# Patient Record
Sex: FEMALE | Race: WHITE | Employment: FULL TIME | URBAN - METROPOLITAN AREA
[De-identification: names, ages, dates, MRNs, and addresses within clinical notes are randomized per-mention and may not be internally consistent; named-entity substitution may affect disease eponyms.]

---

## 2017-06-12 ENCOUNTER — HOSPITAL ENCOUNTER (EMERGENCY)
Age: 32
Discharge: HOME | End: 2017-06-12
Attending: EMERGENCY MEDICINE
Payer: COMMERCIAL

## 2017-06-12 VITALS
RESPIRATION RATE: 18 BRPM | HEART RATE: 68 BPM | BODY MASS INDEX: 39.75 KG/M2 | WEIGHT: 216 LBS | TEMPERATURE: 98.3 F | OXYGEN SATURATION: 100 % | HEIGHT: 62 IN | DIASTOLIC BLOOD PRESSURE: 65 MMHG | SYSTOLIC BLOOD PRESSURE: 147 MMHG
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
## 2017-06-12 DIAGNOSIS — S80.861A INSECT BITE OF LEG, RIGHT, INITIAL ENCOUNTER: Primary | ICD-10-CM

## 2017-06-12 DIAGNOSIS — T78.3XXA GIANT HIVES, INITIAL ENCOUNTER: ICD-10-CM

## 2017-06-12 LAB
ALBUMIN SERPL BCP-MCNC: 3.4 G/DL (ref 3.5–5)
ALBUMIN/GLOB SERPL: 0.9 {RATIO} (ref 1.2–3.5)
ALP SERPL-CCNC: 72 U/L (ref 50–136)
ALT SERPL-CCNC: 23 U/L (ref 12–65)
ANION GAP BLD CALC-SCNC: 11 MMOL/L (ref 7–16)
AST SERPL W P-5'-P-CCNC: 30 U/L (ref 15–37)
BASOPHILS # BLD AUTO: 0 K/UL (ref 0–0.2)
BASOPHILS # BLD: 0 % (ref 0–2)
BILIRUB SERPL-MCNC: 0.2 MG/DL (ref 0.2–1.1)
BUN SERPL-MCNC: 12 MG/DL (ref 6–23)
CALCIUM SERPL-MCNC: 9 MG/DL (ref 8.3–10.4)
CHLORIDE SERPL-SCNC: 104 MMOL/L (ref 98–107)
CO2 SERPL-SCNC: 25 MMOL/L (ref 21–32)
CREAT SERPL-MCNC: 0.91 MG/DL (ref 0.6–1)
DIFFERENTIAL METHOD BLD: ABNORMAL
EOSINOPHIL # BLD: 0 K/UL (ref 0–0.8)
EOSINOPHIL NFR BLD: 0 % (ref 0.5–7.8)
ERYTHROCYTE [DISTWIDTH] IN BLOOD BY AUTOMATED COUNT: 13.8 % (ref 11.9–14.6)
GLOBULIN SER CALC-MCNC: 3.6 G/DL (ref 2.3–3.5)
GLUCOSE SERPL-MCNC: 91 MG/DL (ref 65–100)
HCT VFR BLD AUTO: 39.6 % (ref 35.8–46.3)
HGB BLD-MCNC: 13.8 G/DL (ref 11.7–15.4)
IMM GRANULOCYTES # BLD: 0 K/UL (ref 0–0.5)
IMM GRANULOCYTES NFR BLD AUTO: 0.2 % (ref 0–5)
LACTATE BLD-SCNC: 2.4 MMOL/L (ref 0.5–1.9)
LYMPHOCYTES # BLD AUTO: 23 % (ref 13–44)
LYMPHOCYTES # BLD: 2.3 K/UL (ref 0.5–4.6)
MCH RBC QN AUTO: 30.5 PG (ref 26.1–32.9)
MCHC RBC AUTO-ENTMCNC: 34.8 G/DL (ref 31.4–35)
MCV RBC AUTO: 87.4 FL (ref 79.6–97.8)
MONOCYTES # BLD: 0.4 K/UL (ref 0.1–1.3)
MONOCYTES NFR BLD AUTO: 4 % (ref 4–12)
NEUTS SEG # BLD: 7.2 K/UL (ref 1.7–8.2)
NEUTS SEG NFR BLD AUTO: 73 % (ref 43–78)
PLATELET # BLD AUTO: 243 K/UL (ref 150–450)
PMV BLD AUTO: 10.5 FL (ref 10.8–14.1)
POTASSIUM SERPL-SCNC: 3.9 MMOL/L (ref 3.5–5.1)
PROT SERPL-MCNC: 7 G/DL (ref 6.3–8.2)
RBC # BLD AUTO: 4.53 M/UL (ref 4.05–5.25)
SODIUM SERPL-SCNC: 140 MMOL/L (ref 136–145)
WBC # BLD AUTO: 10 K/UL (ref 4.3–11.1)

## 2017-06-12 PROCEDURE — 96372 THER/PROPH/DIAG INJ SC/IM: CPT | Performed by: EMERGENCY MEDICINE

## 2017-06-12 PROCEDURE — 83605 ASSAY OF LACTIC ACID: CPT

## 2017-06-12 PROCEDURE — 85025 COMPLETE CBC W/AUTO DIFF WBC: CPT | Performed by: EMERGENCY MEDICINE

## 2017-06-12 PROCEDURE — 74011250636 HC RX REV CODE- 250/636: Performed by: EMERGENCY MEDICINE

## 2017-06-12 PROCEDURE — 80053 COMPREHEN METABOLIC PANEL: CPT | Performed by: EMERGENCY MEDICINE

## 2017-06-12 PROCEDURE — 99284 EMERGENCY DEPT VISIT MOD MDM: CPT | Performed by: EMERGENCY MEDICINE

## 2017-06-12 RX ORDER — SULFAMETHOXAZOLE AND TRIMETHOPRIM 800; 160 MG/1; MG/1
1 TABLET ORAL 2 TIMES DAILY
Qty: 14 TAB | Refills: 0 | Status: SHIPPED | OUTPATIENT
Start: 2017-06-12 | End: 2017-06-19

## 2017-06-12 RX ORDER — DEXAMETHASONE SODIUM PHOSPHATE 100 MG/10ML
10 INJECTION INTRAMUSCULAR; INTRAVENOUS
Status: COMPLETED | OUTPATIENT
Start: 2017-06-12 | End: 2017-06-12

## 2017-06-12 RX ORDER — SULFAMETHOXAZOLE AND TRIMETHOPRIM 800; 160 MG/1; MG/1
1 TABLET ORAL 2 TIMES DAILY
Qty: 14 TAB | Refills: 0 | Status: SHIPPED | OUTPATIENT
Start: 2017-06-12 | End: 2017-06-12

## 2017-06-12 RX ADMIN — DEXAMETHASONE SODIUM PHOSPHATE 10 MG: 10 INJECTION INTRAMUSCULAR; INTRAVENOUS at 16:47

## 2017-06-12 NOTE — ED PROVIDER NOTES
HPI Comments: Patient states she thinks she got bit while tubing on the [facility] yesterday. She has a large area of redness that is very itchy. She has not taken anything for it. Patient is a 28 y.o. female presenting with lower extremity edema. The history is provided by the patient. Leg Swelling    This is a new problem. The current episode started yesterday. The problem occurs constantly. The problem has been gradually worsening. The pain is present in the right upper leg. The pain is mild. Associated symptoms include itching. She has tried nothing for the symptoms. There has been no history of extremity trauma. Past Medical History:   Diagnosis Date    Abnormal Pap smear     Anxiety     Depression     Elevated prolactin level (HCC)     Gastric ulcer     GERD (gastroesophageal reflux disease)     Infertility     LGSIL (low grade squamous intraepithelial dysplasia)     Morbid obesity (HCC)        Past Surgical History:   Procedure Laterality Date    HX DILATION AND CURETTAGE  2/17/16    Hysteroscopy/polypectomy    HX GYN      LEEP for NINFA    HX GYN       IUI's    HX PELVIC LAPAROSCOPY  2/17/16    Laser         Family History:   Problem Relation Age of Onset    Other Mother      Colitis    Arthritis-osteo Brother      Rheumatoid    Celiac Disease Sister 27    No Known Problems Father        Social History     Social History    Marital status:      Spouse name: N/A    Number of children: N/A    Years of education: N/A     Occupational History    Not on file.      Social History Main Topics    Smoking status: Former Smoker    Smokeless tobacco: Never Used      Comment: former some day smoker for about 2 yrs in her 19's    Alcohol use 1.0 oz/week     2 Glasses of wine per week    Drug use: No    Sexual activity: Yes     Partners: Male     Birth control/ protection: None      Comment: infertility     Other Topics Concern    Not on file     Social History Narrative    No narrative on file         ALLERGIES: Review of patient's allergies indicates no known allergies. Review of Systems   Constitutional: Negative for chills and fever. Skin: Positive for color change, itching and wound. Vitals:    06/12/17 1424 06/12/17 1612 06/12/17 1616 06/12/17 1646   BP: 145/83 194/86 145/67 147/65   Pulse: 76 70 75 68   Resp: 16 18 18 18   Temp: 98.5 °F (36.9 °C)   98.3 °F (36.8 °C)   SpO2: 99% 100% 100% 100%   Weight: 98 kg (216 lb)      Height: 5' 2\" (1.575 m)               Physical Exam   Constitutional: She is oriented to person, place, and time. She appears well-developed and well-nourished. No distress. Musculoskeletal: She exhibits tenderness. rright thigh above the knee with large area of erythema but minimal edema more consistent with a hive  Then cellulitis. She has a central excoriated area but no abscess. Neurological: She is alert and oriented to person, place, and time. Skin: Skin is warm and dry. She is not diaphoretic. Nursing note and vitals reviewed. MDM  Number of Diagnoses or Management Options  Giant hives, initial encounter:   Insect bite of leg, right, initial encounter:   Diagnosis management comments: Patient given a shot of Decadron and Bactrim to go home with. I suspect her slightly elevated lactic is secondary to her metformin. This does not look infected but more allergic and her symptoms of itching and normal white count to coincide with that. 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        Amount and/or Complexity of Data Reviewed  Clinical lab tests: ordered and reviewed    Risk of Complications, Morbidity, and/or Mortality  Presenting problems: moderate  Diagnostic procedures: low  Management options: low    Patient Progress  Patient progress: stable    ED Course       Procedures

## 2017-06-12 NOTE — ED TRIAGE NOTES
Pt. With redness and swelling around right knee are. Pt. States she believes she got bit by something. Red cellulitic in nature.

## 2017-06-12 NOTE — DISCHARGE INSTRUCTIONS
Allergic Reaction: Care Instructions  Your Care Instructions  An allergic reaction is an excessive response from your immune system to a medicine, chemical, food, insect bite, or other substance. A reaction can range from mild to life-threatening. Some people have a mild rash, hives, and itching or stomach cramps. In severe reactions, swelling of your tongue and throat can close up your airway so that you cannot breathe. Follow-up care is a key part of your treatment and safety. Be sure to make and go to all appointments, and call your doctor if you are having problems. It's also a good idea to know your test results and keep a list of the medicines you take. How can you care for yourself at home? · If you know what caused your allergic reaction, be sure to avoid it. Your allergy may become more severe each time you have a reaction. · Take an over-the-counter antihistamine, such as cetirizine (Zyrtec) or loratadine (Claritin), to treat mild symptoms. Read and follow directions on the label. Some antihistamines can make you feel sleepy. Do not give antihistamines to a child unless you have checked with your doctor first. Mild symptoms include sneezing or an itchy or runny nose; an itchy mouth; a few hives or mild itching; and mild nausea or stomach discomfort. · Do not scratch hives or a rash. Put a cold, moist towel on them or take cool baths to relieve itching. Put ice packs on hives, swelling, or insect stings for 10 to 15 minutes at a time. Put a thin cloth between the ice pack and your skin. Do not take hot baths or showers. They will make the itching worse. · Your doctor may prescribe a shot of epinephrine to carry with you in case you have a severe reaction. Learn how to give yourself the shot and keep it with you at all times. Make sure it is not . · Go to the emergency room every time you have a severe reaction, even if you have used your shot of epinephrine and are feeling better. Symptoms can come back after a shot. · Wear medical alert jewelry that lists your allergies. You can buy this at most drugstores. · If your child has a severe allergy, make sure that his or her teachers, babysitters, coaches, and other caregivers know about the allergy. They should have an epinephrine shot, know how and when to give it, and have a plan to take your child to the hospital.  When should you call for help? Give an epinephrine shot if:  · You think you are having a severe allergic reaction. · You have symptoms in more than one body area, such as mild nausea and an itchy mouth. After giving an epinephrine shot call 911, even if you feel better. Call 911 if:  · You have symptoms of a severe allergic reaction. These may include:  ¨ Sudden raised, red areas (hives) all over your body. ¨ Swelling of the throat, mouth, lips, or tongue. ¨ Trouble breathing. ¨ Passing out (losing consciousness). Or you may feel very lightheaded or suddenly feel weak, confused, or restless. · You have been given an epinephrine shot, even if you feel better. Call your doctor now or seek immediate medical care if:  · You have symptoms of an allergic reaction, such as:  ¨ A rash or hives (raised, red areas on the skin). ¨ Itching. ¨ Swelling. ¨ Belly pain, nausea, or vomiting. Watch closely for changes in your health, and be sure to contact your doctor if:  · You do not get better as expected. Where can you learn more? Go to http://luis-micheal.info/. Enter A812 in the search box to learn more about \"Allergic Reaction: Care Instructions. \"  Current as of: February 12, 2016  Content Version: 11.2  © 9148-9179 [employer]. Care instructions adapted under license by [employer] (which disclaims liability or warranty for this information).  If you have questions about a medical condition or this instruction, always ask your healthcare professional. Dusty Wilson disclaims any warranty or liability for your use of this information. Insect Stings and Bites: Care Instructions  Your Care Instructions  Stings and bites from bees, wasps, ants, and other insects often cause pain, swelling, redness, and itching. In some people, especially children, the redness and swelling may be worse. It may extend several inches beyond the affected area. But in most cases, stings and bites don't cause reactions all over the body. If you have had a reaction to an insect sting or bite, you are at risk for a reaction if you get stung or bitten again. Follow-up care is a key part of your treatment and safety. Be sure to make and go to all appointments, and call your doctor if you are having problems. It's also a good idea to know your test results and keep a list of the medicines you take. How can you care for yourself at home? · Do not scratch or rub the skin where the sting or bite occurred. · Put a cold pack or ice cube on the area. Put a thin cloth between the ice and your skin. For some people, a paste of baking soda mixed with a little water helps relieve pain and decrease the reaction. · Take an over-the-counter antihistamine, such as diphenhydramine (Benadryl) or loratadine (Claritin), to relieve swelling, redness, and itching. Calamine lotion or hydrocortisone cream may also help. Do not give antihistamines to your child unless you have checked with the doctor first.  · Be safe with medicines. If your doctor prescribed medicine for your allergy, take it exactly as prescribed. Call your doctor if you think you are having a problem with your medicine. You will get more details on the specific medicines your doctor prescribes. · Your doctor may prescribe a shot of epinephrine to carry with you in case you have a severe reaction. Learn how and when to give yourself the shot, and keep it with you at all times. Make sure it has not .   · Go to the emergency room anytime you have a severe reaction. Go even if you have given yourself epinephrine and are feeling better. Symptoms can come back. When should you call for help? Call 911 anytime you think you may need emergency care. For example, call if:  · You have symptoms of a severe allergic reaction. These may include:  ¨ Sudden raised, red areas (hives) all over your body. ¨ Swelling of the throat, mouth, lips, or tongue. ¨ Trouble breathing. ¨ Passing out (losing consciousness). Or you may feel very lightheaded or suddenly feel weak, confused, or restless. Call your doctor now or seek immediate medical care if:  · You have symptoms of an allergic reaction not right at the sting or bite, such as:  ¨ A rash or small area of hives (raised, red areas on the skin). ¨ Itching. ¨ Swelling. ¨ Belly pain, nausea, or vomiting. · You have a lot of swelling around the site (such as your entire arm or leg is swollen). · You have signs of infection, such as:  ¨ Increased pain, swelling, redness, or warmth around the sting. ¨ Red streaks leading from the area. ¨ Pus draining from the sting. ¨ A fever. Watch closely for changes in your health, and be sure to contact your doctor if:  · You do not get better as expected. Where can you learn more? Go to http://luis-micehal.info/. Enter P390 in the search box to learn more about \"Insect Stings and Bites: Care Instructions. \"  Current as of: July 28, 2016  Content Version: 11.2  © 0961-3723 [employer]. Care instructions adapted under license by [employer] (which disclaims liability or warranty for this information). If you have questions about a medical condition or this instruction, always ask your healthcare professional. [place] [address] any warranty or liability for your use of this information.

## 2017-06-12 NOTE — ED NOTES
I have reviewed discharge instructions with the patient. The patient verbalized understanding. I have reviewed discharge instructions with the patient. The patient verbalized understanding.

## 2017-09-13 PROBLEM — O99.212 OBESITY AFFECTING PREGNANCY IN SECOND TRIMESTER: Status: ACTIVE | Noted: 2017-09-13

## 2017-09-13 PROBLEM — Z34.02 SUPERVISION OF NORMAL FIRST PREGNANCY IN SECOND TRIMESTER: Status: ACTIVE | Noted: 2017-09-13

## 2017-10-23 PROBLEM — Z98.890 HISTORY OF LOOP ELECTRICAL EXCISION PROCEDURE (LEEP): Status: ACTIVE | Noted: 2017-10-23

## 2017-12-04 PROBLEM — O34.42 HX LEEP (LOOP ELECTROSURGICAL EXCISION PROCEDURE), CERVIX, PREGNANCY, SECOND TRIMESTER: Status: ACTIVE | Noted: 2017-10-23

## 2017-12-04 PROBLEM — O35.8XX0 SUSPECTED DAMAGE TO FETUS FROM OTHER DISEASE IN MOTHER, AFFECTING MANAGEMENT OF MOTHER, ANTEPARTUM CONDITION OR COMPLICATION: Status: ACTIVE | Noted: 2017-12-04

## 2017-12-04 PROBLEM — O09.92 HIGH-RISK PREGNANCY IN SECOND TRIMESTER: Status: ACTIVE | Noted: 2017-09-13

## 2018-02-22 ENCOUNTER — HOSPITAL ENCOUNTER (EMERGENCY)
Age: 33
Discharge: HOME | End: 2018-02-22
Attending: OBSTETRICS & GYNECOLOGY | Admitting: OBSTETRICS & GYNECOLOGY
Payer: COMMERCIAL

## 2018-02-22 VITALS
BODY MASS INDEX: 44.97 KG/M2 | HEIGHT: 61 IN | SYSTOLIC BLOOD PRESSURE: 144 MMHG | DIASTOLIC BLOOD PRESSURE: 77 MMHG | HEART RATE: 67 BPM | TEMPERATURE: 98.6 F | RESPIRATION RATE: 18 BRPM

## 2018-02-22 PROBLEM — Z34.90 PREGNANCY: Status: ACTIVE | Noted: 2018-02-22

## 2018-02-22 PROCEDURE — 99281 EMR DPT VST MAYX REQ PHY/QHP: CPT

## 2018-02-22 PROCEDURE — 99283 EMERGENCY DEPT VISIT LOW MDM: CPT | Performed by: OBSTETRICS & GYNECOLOGY

## 2018-02-22 PROCEDURE — 59025 FETAL NON-STRESS TEST: CPT

## 2018-02-22 NOTE — IP AVS SNAPSHOT
Summary of Care Report The Summary of Care report has been created to help improve care coordination. Users with access to [employer] or [facility] (Web-based application) may access additional patient information including the Discharge Summary. If you are not currently a [address] user and need more information, please call the number listed below in the [place] [address] section and ask to be connected with Medical Records. Facility Information Name Address Phone [id number] [place] [address] [address] [place] [id number] [phone] Patient Information Patient Name Sex LATASHA Burns ([id number]) Female 1985 Discharge Information Admitting Provider Service Area Unit Isabel Le MD / [address] [facility] [address] / [phone] Discharge Provider Discharge Date/Time Discharge Disposition Destination (none) (none) (none) (none) Patient Language Language ENGLISH [13] Hospital Problems as of 2018  Reviewed: 2018  4:04 PM by Klever Urbano MD  
  
  
  
 Class Noted - Resolved Last Modified POA Active Problems Pregnancy  2018 - Present 2018 by Isabel Le MD Unknown Entered by Isabel Le MD  
  
Non-Hospital Problems as of 2018  Reviewed: 2018  4:04 PM by Klever Urbano MD  
  
  
  
 Class Noted - Resolved Last Modified Active Problems High-risk pregnancy in second trimester  2017 - Present 2017 by Anna Lowery MD  
  Entered by Klever Urbano MD  
  Overview Signed 2017 10:43 PM by Klever Urbano MD  
   26yo G1 
PREG pt 
AB + / RI / nl pap   Obesity affecting pregnancy in second trimester  2017 - Present 2017 by Anna Lowery MD  
  Entered by Klever Urbano MD  
 Overview Addendum 10/23/2017  2:41 PM by Christiano Chan MD  
   Early GTT at 15 wks Early glucola wnl (111), repeat 28wks Hx LEEP (loop electrosurgical excision procedure), cervix, pregnancy, second trimester  10/23/2017 - Present 12/4/2017 by Tacos Pop MD  
  Entered by Christiano Chan MD  
  Overview Addendum 12/4/2017  3:36 PM by Hari Segovia RN  
   20wk CL: ____  
 
12/4/2017 at [facility]:  TA CL 4.7 cm. No PTL symptoms. Suspected damage to fetus from other disease in mother, affecting management of mother, antepartum condition or complication  36/[id number]/[id number] - Present 12/4/2017 by Hari Segovia RN Entered by Hari Segovia RN Overview Addendum 12/4/2017  4:26 PM by Hari Segovia RN  
   12/4/2017 at [facility]:  Normal Anatomy/Fetal Echo, TA CL 4.7 cm. · No follow up at [facility]; will see back prn. Breech presentation, no version  2/22/2018 - Present 2/22/2018 by Dillon Brunson MD  
  Entered by Dillon Brunson MD  
  
You are allergic to the following No active allergies Current Discharge Medication List  
  
ASK your doctor about these medications Dose & Instructions Dispensing Information Comments Blood-Glucose Meter monitoring kit Check qid one hour after each meal and before bedtime Quantity:  1 Kit Refills:  0  
   
 glucose blood VI test strips strip Commonly known as:  ASCENSIA AUTODISC VI, ONE TOUCH ULTRA TEST VI Check qid one hour after each meal and before bedtime Quantity:  120 Strip Refills:  1 Lancets Misc Check qid one hour after each meal and before bedtime Quantity:  1 Each Refills:  11 PNV#16-Iron Fum & PS-FA-OM-3 35-1-200 mg Cap Take  by mouth. Refills:  0 PREVACID 15 mg capsule Generic drug:  lansoprazole Take  by mouth Daily (before breakfast). Refills:  0  
   
 sertraline 50 mg tablet Commonly known as:  ZOLOFT  
 TAKE 1 TABLET BY MOUTH DAILY. INDICATIONS: ANXIETY WITH DEPRESSION Quantity:  30 Tab Refills:  6 Current Immunizations Name Date Influenza Vaccine 10/8/2014 Tdap 2008 Follow-up Information None Discharge Instructions Week 38 of Your Pregnancy: Care Instructions Your Care Instructions Believe it or not, your baby is almost here. You may have ideas about your baby's personality because of how much he or she moves. Or you may have noticed how he or she responds to sounds, warmth, cold, and light. You may even know what kind of music your baby likes. By now, you have a better idea of what to expect during delivery. You may have talked about your birth preferences with your doctor. But even if you want a vaginal birth, it is a good idea to learn about  births.  birth means that your baby is born through a cut (incision) in your lower belly. It is sometimes the best choice for the health of the baby and the mother. This care sheet can help you understand  births. It also gives you information about what to expect after your baby is born. And it helps you understand more about postpartum depression. Follow-up care is a key part of your treatment and safety. Be sure to make and go to all appointments, and call your doctor if you are having problems. It's also a good idea to know your test results and keep a list of the medicines you take. How can you care for yourself at home? Learn about  birth · Most C-sections are unplanned. They are done because of problems that occur during labor. These problems might include: 
¨ Labor that slows or stops. ¨ High blood pressure or other problems for the mother. ¨ Signs of distress in the baby. These signs may include a very fast or slow heart rate. · Although most mothers and babies do well after , it is major surgery. It has more risks than a vaginal delivery. · In some cases, a planned  may be safer than a vaginal delivery. This may be the case if: ¨ The mother has a health problem, such as a heart condition. ¨ The baby isn't in a head-down position for delivery. This is called a breech position. ¨ The uterus has scars from past surgeries. This could increase the chance of a tear in the uterus. ¨ There is a problem with the placenta. ¨ The mother has an infection, such as genital herpes, that could be spread to the baby. ¨ The mother is having twins or more. ¨ The baby weighs 9 to 10 pounds or more. · Because of the risks of , planned C-sections generally should be done only for medical reasons. And a planned  should be done at 39 weeks or later unless there is a medical reason to do it sooner. Know what to expect after delivery, and plan for the first few weeks at home · You, your baby, and your partner or  will get identification bands. Only people with matching bands can  the baby from the nursery. · You will learn how to feed, diaper, and bathe your baby. And you will learn how to care for the umbilical cord stump. If your baby will be circumcised, you will also learn how to care for that. · Ask people to wait to visit you until you are at home. And ask them to wash their hands before they touch your baby. · Make sure you have another adult in your home for at least 2 or 3 days after the birth. · During the first 2 weeks, limit when friends and family can visit. · Do not allow visitors who have colds or infections. Make sure all visitors are up to date with their vaccinations. Never let anyone smoke around your baby. · Try to nap when the baby naps. Be aware of postpartum depression · \"Baby blues\" are common for the first 1 to 2 weeks after birth. You may cry or feel sad or irritable for no reason. · For some women, these feelings last longer and are more intense. This is called postpartum depression. · If your symptoms last for more than a few weeks or you feel very depressed, ask your doctor for help. · Postpartum depression can be treated. Support groups and counseling can help. Sometimes medicine can also help. Where can you learn more? Go to http://luis-micheal.info/. Enter B044 in the search box to learn more about \"Week 38 of Your Pregnancy: Care Instructions. \" Current as of: March 16, 2017 Content Version: 11.4 © 5706-9331 [employer]. Care instructions adapted under license by [employer] (which disclaims liability or warranty for this information). If you have questions about a medical condition or this instruction, always ask your healthcare professional. [place] [address] any warranty or liability for your use of this information. Counting Your Baby's Kicks: Care Instructions Your Care Instructions Counting your baby's kicks is one way your doctor can tell that your baby is healthy. Most women-especially in a first pregnancy-feel their baby move for the first time between 16 and 22 weeks. The movement may feel like flutters rather than kicks. Your baby may move more at certain times of the day. When you are active, you may notice less kicking than when you are resting. At your prenatal visits, your doctor will ask whether the baby is active. In your last trimester, your doctor may ask you to count the number of times you feel your baby move. Follow-up care is a key part of your treatment and safety. Be sure to make and go to all appointments, and call your doctor if you are having problems. It's also a good idea to know your test results and keep a list of the medicines you take. How do you count fetal kicks? · A common method of checking your baby's movement is to count the number of kicks or moves you feel in 1 hour.  Ten movements (such as kicks, flutters, or rolls) in 1 hour are normal. Some doctors suggest that you count in the morning until you get to 10 movements. Then you can quit for that day and start again the next day. · Pick your baby's most active time of day to count. This may be any time from morning to evening. · If you do not feel 10 movements in an hour, your baby may be sleeping. Wait for the next hour and count again. When should you call for help? Call your doctor now or seek immediate medical care if: 
? · You noticed that your baby has stopped moving or is moving much less than normal. ? Watch closely for changes in your health, and be sure to contact your doctor if you have any problems. Where can you learn more? Go to http://luis-micheal.info/. Enter C576 in the search box to learn more about \"Counting Your Baby's Kicks: Care Instructions. \" Current as of: March 16, 2017 Content Version: 11.4 © 1082-0842 [employer]. Care instructions adapted under license by [employer] (which disclaims liability or warranty for this information). If you have questions about a medical condition or this instruction, always ask your healthcare professional. [place] [address] any warranty or liability for your use of this information. Chart Review Routing History No Routing History on File

## 2018-02-22 NOTE — PROGRESS NOTES
History & Physical    Name: Socrates Tate MRN: [id number]  SSN: [id number]    YOB: 1985  Age: 33 y.o. Sex: female      Subjective:     Reason for Admission:  Pregnancy and RISSA 5.5 with decreased fetal movement    History of Present Illness: Ms. Malik Dumont is a 33 y.o.  female with an estimated gestational age of 43w4d with Estimated Date of Delivery: 3/7/18. She was seen in the office with decreased fetal movement. She had a BPP which was 8/8. Fluid was noted to be 5.5. Here for prolonged monitoring. OB History    Para Term  AB Living   1 0 0 0 0 0   SAB TAB Ectopic Molar Multiple Live Births   0 0 0 0 0 0      # Outcome Date GA Lbr Eugene/2nd Weight Sex Delivery Anes PTL Lv   1 Current                 Past Medical History:   Diagnosis Date    Abnormal Papanicolaou smear of cervix     Anxiety     Depression     Elevated prolactin level (HCC)     Gastric ulcer     GERD (gastroesophageal reflux disease)     Infertility, female     anovulation, male factor    LGSIL (low grade squamous intraepithelial dysplasia)     Morbid obesity (HCC)     Varicose vein of leg     RLE     Past Surgical History:   Procedure Laterality Date    HX DILATION AND CURETTAGE  16    Hysteroscopy/polypectomy    HX GYN       IUI's    HX HYSTEROSCOPY      uterine polyps    HX LEEP PROCEDURE      HX PELVIC LAPAROSCOPY  16    Laser     Social History     Occupational History    Not on file.      Social History Main Topics    Smoking status: Former Smoker    Smokeless tobacco: Never Used      Comment: former some day smoker for about 2 yrs in her 20's    Alcohol use No      Comment: none in pregnancy    Drug use: No    Sexual activity: Yes     Partners: Male     Birth control/ protection: None      Family History   Problem Relation Age of Onset    Other Mother      Colitis    No Known Problems Father     Arthritis-osteo Brother      Rheumatoid    Celiac Disease Sister 30       No Known Allergies  Prior to Admission medications    Medication Sig Start Date End Date Taking? Authorizing Provider   sertraline (ZOLOFT) 50 mg tablet TAKE 1 TABLET BY MOUTH DAILY. INDICATIONS: ANXIETY WITH DEPRESSION 17  Yes Kia Amaya MD   lansoprazole (PREVACID) 15 mg capsule Take  by mouth Daily (before breakfast). Yes Historical Provider   PNV#16-Iron Fum & PS-FA-OM-3 35-1-200 mg cap Take  by mouth. Yes Historical Provider   Blood-Glucose Meter monitoring kit Check qid one hour after each meal and before bedtime 18   Kia Amaya MD   Lancets misc Check qid one hour after each meal and before bedtime 18   Kia Amaya MD   glucose blood VI test strips (ASCENSIA AUTODISC VI, ONE TOUCH ULTRA TEST VI) strip Check qid one hour after each meal and before bedtime 18   Kia Amaya MD        Review of Systems:  Pertinent items are noted in the History of Present Illness. Objective:     Vitals:    Vitals:    18 1811   BP: 144/77   Pulse: 67   Resp: 18   Temp: 98.6 °F (37 °C)   Height: 5' 1\" (1.549 m)      Temp (24hrs), Av.6 °F (37 °C), Min:98.6 °F (37 °C), Max:98.6 °F (37 °C)    BP  Min: 132/80  Max: 144/77     Physical Exam:  Patient without distress. Membranes:  Intact  Uterine Activity:  None  Fetal Heart Rate:  Reactive       Lab/Data Review:  No results found for this or any previous visit (from the past 24 hour(s)). Assessment and Plan: Active Problems:    Pregnancy (2018)       Will monitor for an hour. If reactive without significant decels then release to home. Has follow up appt tomorrow to recheck RISSA. Encourage po hydration. Baby is breech so will need  at time of delivery.      Signed By:  Ana Alvarado MD     2018

## 2018-02-22 NOTE — IP AVS SNAPSHOT
[address] 
[phone] Patient: Aria Newton MRN: [id number] GME:7/8/9618 About your hospitalization You were admitted on:  N/A You last received care in the:  [facility] You were discharged on:  February 22, 2018 Why you were hospitalized Your primary diagnosis was:  Not on File Your diagnoses also included:  Pregnancy Follow-up Information None Your Scheduled Appointments Friday February 23, 2018  8:00 AM EST Ultrasound plus physician visit with Rey Pearl MD, [address] [id number] [phone] Discharge Orders None A check arelis indicates which time of day the medication should be taken. My Medications ASK your doctor about these medications Instructions Each Dose to Equal  
 Morning Noon Evening Bedtime Blood-Glucose Meter monitoring kit Your last dose was: Your next dose is:    
   
   
 Check qid one hour after each meal and before bedtime  
     
   
   
   
  
 glucose blood VI test strips strip Commonly known as:  ASCENSIA AUTODISC VI, ONE TOUCH ULTRA TEST VI Your last dose was: Your next dose is:    
   
   
 Check qid one hour after each meal and before bedtime Lancets Misc Your last dose was: Your next dose is:    
   
   
 Check qid one hour after each meal and before bedtime PNV#16-Iron Fum & PS-FA-OM-3 35-1-200 mg Cap Your last dose was: Your next dose is: Take  by mouth. PREVACID 15 mg capsule Generic drug:  lansoprazole Your last dose was: Your next dose is: Take  by mouth Daily (before breakfast). sertraline 50 mg tablet Commonly known as:  ZOLOFT Your last dose was: Your next dose is: TAKE 1 TABLET BY MOUTH DAILY. INDICATIONS: ANXIETY WITH DEPRESSION Discharge Instructions Week 38 of Your Pregnancy: Care Instructions Your Care Instructions Believe it or not, your baby is almost here. You may have ideas about your baby's personality because of how much he or she moves. Or you may have noticed how he or she responds to sounds, warmth, cold, and light. You may even know what kind of music your baby likes. By now, you have a better idea of what to expect during delivery. You may have talked about your birth preferences with your doctor. But even if you want a vaginal birth, it is a good idea to learn about  births.  birth means that your baby is born through a cut (incision) in your lower belly. It is sometimes the best choice for the health of the baby and the mother. This care sheet can help you understand  births. It also gives you information about what to expect after your baby is born. And it helps you understand more about postpartum depression. Follow-up care is a key part of your treatment and safety. Be sure to make and go to all appointments, and call your doctor if you are having problems. It's also a good idea to know your test results and keep a list of the medicines you take. How can you care for yourself at home? Learn about  birth · Most C-sections are unplanned. They are done because of problems that occur during labor. These problems might include: 
¨ Labor that slows or stops. ¨ High blood pressure or other problems for the mother. ¨ Signs of distress in the baby. These signs may include a very fast or slow heart rate. · Although most mothers and babies do well after , it is major surgery. It has more risks than a vaginal delivery. · In some cases, a planned  may be safer than a vaginal delivery. This may be the case if: ¨ The mother has a health problem, such as a heart condition. ¨ The baby isn't in a head-down position for delivery. This is called a breech position. ¨ The uterus has scars from past surgeries. This could increase the chance of a tear in the uterus. ¨ There is a problem with the placenta. ¨ The mother has an infection, such as genital herpes, that could be spread to the baby. ¨ The mother is having twins or more. ¨ The baby weighs 9 to 10 pounds or more. · Because of the risks of , planned C-sections generally should be done only for medical reasons. And a planned  should be done at 39 weeks or later unless there is a medical reason to do it sooner. Know what to expect after delivery, and plan for the first few weeks at home · You, your baby, and your partner or  will get identification bands. Only people with matching bands can  the baby from the nursery. · You will learn how to feed, diaper, and bathe your baby. And you will learn how to care for the umbilical cord stump. If your baby will be circumcised, you will also learn how to care for that. · Ask people to wait to visit you until you are at home. And ask them to wash their hands before they touch your baby. · Make sure you have another adult in your home for at least 2 or 3 days after the birth. · During the first 2 weeks, limit when friends and family can visit. · Do not allow visitors who have colds or infections. Make sure all visitors are up to date with their vaccinations. Never let anyone smoke around your baby. · Try to nap when the baby naps. Be aware of postpartum depression · \"Baby blues\" are common for the first 1 to 2 weeks after birth. You may cry or feel sad or irritable for no reason. · For some women, these feelings last longer and are more intense. This is called postpartum depression. · If your symptoms last for more than a few weeks or you feel very depressed, ask your doctor for help. · Postpartum depression can be treated. Support groups and counseling can help. Sometimes medicine can also help. Where can you learn more? Go to http://luis-micheal.info/. Enter B044 in the search box to learn more about \"Week 38 of Your Pregnancy: Care Instructions. \" Current as of: March 16, 2017 Content Version: 11.4 © 3753-5302 [employer]. Care instructions adapted under license by [employer] (which disclaims liability or warranty for this information). If you have questions about a medical condition or this instruction, always ask your healthcare professional. [place] [address] any warranty or liability for your use of this information. Counting Your Baby's Kicks: Care Instructions Your Care Instructions Counting your baby's kicks is one way your doctor can tell that your baby is healthy. Most women-especially in a first pregnancy-feel their baby move for the first time between 16 and 22 weeks. The movement may feel like flutters rather than kicks. Your baby may move more at certain times of the day. When you are active, you may notice less kicking than when you are resting. At your prenatal visits, your doctor will ask whether the baby is active. In your last trimester, your doctor may ask you to count the number of times you feel your baby move. Follow-up care is a key part of your treatment and safety. Be sure to make and go to all appointments, and call your doctor if you are having problems. It's also a good idea to know your test results and keep a list of the medicines you take. How do you count fetal kicks? · A common method of checking your baby's movement is to count the number of kicks or moves you feel in 1 hour.  Ten movements (such as kicks, flutters, or rolls) in 1 hour are normal. Some doctors suggest that you count in the morning until you get to 10 movements. Then you can quit for that day and start again the next day. · Pick your baby's most active time of day to count. This may be any time from morning to evening. · If you do not feel 10 movements in an hour, your baby may be sleeping. Wait for the next hour and count again. When should you call for help? Call your doctor now or seek immediate medical care if: 
? · You noticed that your baby has stopped moving or is moving much less than normal. ? Watch closely for changes in your health, and be sure to contact your doctor if you have any problems. Where can you learn more? Go to http://luis-micheal.info/. Enter G185 in the search box to learn more about \"Counting Your Baby's Kicks: Care Instructions. \" Current as of: March 16, 2017 Content Version: 11.4 © 6243-5312 [employer]. Care instructions adapted under license by [employer] (which disclaims liability or warranty for this information). If you have questions about a medical condition or this instruction, always ask your healthcare professional. [place] [address] any warranty or liability for your use of this information. Introducing [facility]! Dear Stan Everett: Thank you for requesting a SimplyCast account. Our records indicate that you already have an active SimplyCast account. You can access your account anytime at [email or web address] Did you know that you can access your hospital and ER discharge instructions at any time in SimplyCast? You can also review all of your test results from your hospital stay or ER visit. Additional Information If you have questions, please visit the Frequently Asked Questions section of the SimplyCast website at [email or web address] Remember, SimplyCast is NOT to be used for urgent needs. For medical emergencies, dial 911. Now available from your iPhone and Android! Providers Seen During Your Hospitalization Provider Specialty Primary office phone Cyndi Cintron MD Obstetrics & Gynecology [phone] Your Primary Care Physician (PCP) Primary Care Physician Office Phone Office Fax NONE ** None ** ** None ** You are allergic to the following No active allergies Recent Documentation Height BMI OB Status Smoking Status 1.549 m 44.97 kg/m2 Pregnant Former Smoker Emergency Contacts Name Discharge Info Relation Home Work Mobile Kiran Cortés  Spouse [3] [phone] Kari Bernal  Mother [14] [phone] Patient Belongings The following personal items are in your possession at time of discharge: 
                             
 
  
  
 Please provide this summary of care documentation to your next provider. Signatures-by signing, you are acknowledging that this After Visit Summary has been reviewed with you and you have received a copy. Patient Signature:  ____________________________________________________________ Date:  ____________________________________________________________  
  
[place] Favorite Provider Signature:  ____________________________________________________________ Date:  ____________________________________________________________

## 2018-02-22 NOTE — IP AVS SNAPSHOT
[address] 
[phone] Patient: Lokesh Schafer MRN: [id number] FRN:3/[birth year]/4540 A check arelis indicates which time of day the medication should be taken. My Medications ASK your doctor about these medications Instructions Each Dose to Equal  
 Morning Noon Evening Bedtime Blood-Glucose Meter monitoring kit Your last dose was: Your next dose is:    
   
   
 Check qid one hour after each meal and before bedtime  
     
   
   
   
  
 glucose blood VI test strips strip Commonly known as:  ASCENSIA AUTODISC VI, ONE TOUCH ULTRA TEST VI Your last dose was: Your next dose is:    
   
   
 Check qid one hour after each meal and before bedtime Lancets Misc Your last dose was: Your next dose is:    
   
   
 Check qid one hour after each meal and before bedtime PNV#16-Iron Fum & PS-FA-OM-3 35-1-200 mg Cap Your last dose was: Your next dose is: Take  by mouth. PREVACID 15 mg capsule Generic drug:  lansoprazole Your last dose was: Your next dose is: Take  by mouth Daily (before breakfast). sertraline 50 mg tablet Commonly known as:  ZOLOFT Your last dose was: Your next dose is: TAKE 1 TABLET BY MOUTH DAILY.  INDICATIONS: ANXIETY WITH DEPRESSION

## 2018-02-23 NOTE — PROGRESS NOTES
[facility] report to Dr Driver Broadanca orders received to discharge home with Labor and kick count precautions, patient verbalizes understanding of these.

## 2018-02-23 NOTE — DISCHARGE INSTRUCTIONS
Week 38 of Your Pregnancy: Care Instructions  Your Care Instructions    Believe it or not, your baby is almost here. You may have ideas about your baby's personality because of how much he or she moves. Or you may have noticed how he or she responds to sounds, warmth, cold, and light. You may even know what kind of music your baby likes. By now, you have a better idea of what to expect during delivery. You may have talked about your birth preferences with your doctor. But even if you want a vaginal birth, it is a good idea to learn about  births.  birth means that your baby is born through a cut (incision) in your lower belly. It is sometimes the best choice for the health of the baby and the mother. This care sheet can help you understand  births. It also gives you information about what to expect after your baby is born. And it helps you understand more about postpartum depression. Follow-up care is a key part of your treatment and safety. Be sure to make and go to all appointments, and call your doctor if you are having problems. It's also a good idea to know your test results and keep a list of the medicines you take. How can you care for yourself at home? Learn about  birth  · Most C-sections are unplanned. They are done because of problems that occur during labor. These problems might include:  ¨ Labor that slows or stops. ¨ High blood pressure or other problems for the mother. ¨ Signs of distress in the baby. These signs may include a very fast or slow heart rate. · Although most mothers and babies do well after , it is major surgery. It has more risks than a vaginal delivery. · In some cases, a planned  may be safer than a vaginal delivery. This may be the case if:  ¨ The mother has a health problem, such as a heart condition. ¨ The baby isn't in a head-down position for delivery. This is called a breech position.   ¨ The uterus has scars from past surgeries. This could increase the chance of a tear in the uterus. ¨ There is a problem with the placenta. ¨ The mother has an infection, such as genital herpes, that could be spread to the baby. ¨ The mother is having twins or more. ¨ The baby weighs 9 to 10 pounds or more. · Because of the risks of , planned C-sections generally should be done only for medical reasons. And a planned  should be done at 39 weeks or later unless there is a medical reason to do it sooner. Know what to expect after delivery, and plan for the first few weeks at home  · You, your baby, and your partner or  will get identification bands. Only people with matching bands can  the baby from the nursery. · You will learn how to feed, diaper, and bathe your baby. And you will learn how to care for the umbilical cord stump. If your baby will be circumcised, you will also learn how to care for that. · Ask people to wait to visit you until you are at home. And ask them to wash their hands before they touch your baby. · Make sure you have another adult in your home for at least 2 or 3 days after the birth. · During the first 2 weeks, limit when friends and family can visit. · Do not allow visitors who have colds or infections. Make sure all visitors are up to date with their vaccinations. Never let anyone smoke around your baby. · Try to nap when the baby naps. Be aware of postpartum depression  · \"Baby blues\" are common for the first 1 to 2 weeks after birth. You may cry or feel sad or irritable for no reason. · For some women, these feelings last longer and are more intense. This is called postpartum depression. · If your symptoms last for more than a few weeks or you feel very depressed, ask your doctor for help. · Postpartum depression can be treated. Support groups and counseling can help. Sometimes medicine can also help. Where can you learn more?   Go to http://luis-micheal.info/. Enter B044 in the search box to learn more about \"Week 38 of Your Pregnancy: Care Instructions. \"  Current as of: March 16, 2017  Content Version: 11.4  © 6523-9713 [employer]. Care instructions adapted under license by [employer] (which disclaims liability or warranty for this information). If you have questions about a medical condition or this instruction, always ask your healthcare professional. [address] any warranty or liability for your use of this information. Counting Your Baby's Kicks: Care Instructions  Your Care Instructions    Counting your baby's kicks is one way your doctor can tell that your baby is healthy. Most women-especially in a first pregnancy-feel their baby move for the first time between 16 and 22 weeks. The movement may feel like flutters rather than kicks. Your baby may move more at certain times of the day. When you are active, you may notice less kicking than when you are resting. At your prenatal visits, your doctor will ask whether the baby is active. In your last trimester, your doctor may ask you to count the number of times you feel your baby move. Follow-up care is a key part of your treatment and safety. Be sure to make and go to all appointments, and call your doctor if you are having problems. It's also a good idea to know your test results and keep a list of the medicines you take. How do you count fetal kicks? · A common method of checking your baby's movement is to count the number of kicks or moves you feel in 1 hour. Ten movements (such as kicks, flutters, or rolls) in 1 hour are normal. Some doctors suggest that you count in the morning until you get to 10 movements. Then you can quit for that day and start again the next day. · Pick your baby's most active time of day to count. This may be any time from morning to evening.   · If you do not feel 10 movements in an hour, your baby may be sleeping. Wait for the next hour and count again. When should you call for help? Call your doctor now or seek immediate medical care if:  ? · You noticed that your baby has stopped moving or is moving much less than normal.   ? Watch closely for changes in your health, and be sure to contact your doctor if you have any problems. Where can you learn more? Go to http://luis-micheal.info/. Enter R396 in the search box to learn more about \"Counting Your Baby's Kicks: Care Instructions. \"  Current as of: March 16, 2017  Content Version: 11.4  © 4434-0502 [employer]ault. Care instructions adapted under license by [employer]) (which disclaims liability or warranty for this information). If you have questions about a medical condition or this instruction, always ask your healthcare professional. [address] any warranty or liability for your use of this information.

## 2018-02-25 ENCOUNTER — HOSPITAL ENCOUNTER (EMERGENCY)
Age: 33
Discharge: HOME | End: 2018-02-25
Attending: OBSTETRICS & GYNECOLOGY | Admitting: OBSTETRICS & GYNECOLOGY
Payer: COMMERCIAL

## 2018-02-25 VITALS
HEART RATE: 59 BPM | WEIGHT: 238 LBS | DIASTOLIC BLOOD PRESSURE: 61 MMHG | SYSTOLIC BLOOD PRESSURE: 144 MMHG | TEMPERATURE: 98.8 F | HEIGHT: 61 IN | RESPIRATION RATE: 20 BRPM | BODY MASS INDEX: 44.93 KG/M2

## 2018-02-25 PROCEDURE — 59025 FETAL NON-STRESS TEST: CPT

## 2018-02-25 NOTE — DISCHARGE INSTRUCTIONS
Pregnancy Precautions: Care Instructions  Your Care Instructions    There is no sure way to prevent labor before your due date ( labor) or to prevent most other pregnancy problems. But there are things you can do to increase your chances of a healthy pregnancy. Go to your appointments, follow your doctor's advice, and take good care of yourself. Eat well, and exercise (if your doctor agrees). And make sure to drink plenty of water. Follow-up care is a key part of your treatment and safety. Be sure to make and go to all appointments, and call your doctor if you are having problems. It's also a good idea to know your test results and keep a list of the medicines you take. How can you care for yourself at home? · Make sure you go to your prenatal appointments. At each visit, your doctor will check your blood pressure. Your doctor will also check to see if you have protein in your urine. High blood pressure and protein in urine are signs of preeclampsia. This condition can be dangerous for you and your baby. · Drink plenty of fluids, enough so that your urine is light yellow or clear like water. Dehydration can cause contractions. If you have kidney, heart, or liver disease and have to limit fluids, talk with your doctor before you increase the amount of fluids you drink. · Tell your doctor right away if you notice any symptoms of an infection, such as:  ¨ Burning when you urinate. ¨ A foul-smelling discharge from your vagina. ¨ Vaginal itching. ¨ Unexplained fever. ¨ Unusual pain or soreness in your uterus or lower belly. · Eat a balanced diet. Include plenty of foods that are high in calcium and iron. ¨ Foods high in calcium include milk, cheese, yogurt, almonds, and broccoli. ¨ Foods high in iron include red meat, shellfish, poultry, eggs, beans, raisins, whole-grain bread, and leafy green vegetables. · Do not smoke.  If you need help quitting, talk to your doctor about stop-smoking programs and medicines. These can increase your chances of quitting for good. · Do not drink alcohol or use illegal drugs. · Follow your doctor's directions about activity. Your doctor will let you know how much, if any, exercise you can do. · Ask your doctor if you can have sex. If you are at risk for early labor, your doctor may ask you to not have sex. · Take care to prevent falls. During pregnancy, your joints are loose, and your balance is off. Sports such as bicycling, skiing, or in-line skating can increase your risk of falling. And don't ride horses or motorcycles, dive, water ski, scuba dive, or parachute jump while you are pregnant. · Avoid getting very hot. Do not use saunas or hot tubs. Avoid staying out in the sun in hot weather for long periods. Take acetaminophen (Tylenol) to lower a high fever. · Do not take any over-the-counter or herbal medicines or supplements without talking to your doctor or pharmacist first.  When should you call for help? Call 911 anytime you think you may need emergency care. For example, call if:  ? · You passed out (lost consciousness). ? · You have severe vaginal bleeding. ? · You have severe pain in your belly or pelvis. ? · You have had fluid gushing or leaking from your vagina and you know or think the umbilical cord is bulging into your vagina. If this happens, immediately get down on your knees so your rear end (buttocks) is higher than your head. This will decrease the pressure on the cord until help arrives. ?Call your doctor now or seek immediate medical care if:  ? · You have signs of preeclampsia, such as:  ¨ Sudden swelling of your face, hands, or feet. ¨ New vision problems (such as dimness or blurring). ¨ A severe headache. ? · You have any vaginal bleeding. ? · You have belly pain or cramping. ? · You have a fever. ? · You have had regular contractions (with or without pain) for an hour.  This means that you have 8 or more within 1 hour or 4 or more in 20 minutes after you change your position and drink fluids. ? · You have a sudden release of fluid from your vagina. ? · You have low back pain or pelvic pressure that does not go away. ? · You notice that your baby has stopped moving or is moving much less than normal.   ? Watch closely for changes in your health, and be sure to contact your doctor if you have any problems. Where can you learn more? Go to http://luis-micheal.info/. Enter [id number] in the search box to learn more about \"Pregnancy Precautions: Care Instructions. \"  Current as of: March 16, 2017  Content Version: 11.4  © 4924-6042 [employer]. Care instructions adapted under license by [employer] (which disclaims liability or warranty for this information). If you have questions about a medical condition or this instruction, always ask your healthcare professional. [address] any warranty or liability for your use of this information.

## 2018-02-27 ENCOUNTER — ANESTHESIA EVENT (OUTPATIENT)
Dept: LABOR AND DELIVERY | Age: 33
End: 2018-02-27
Payer: COMMERCIAL

## 2018-02-28 ENCOUNTER — ANESTHESIA (OUTPATIENT)
Dept: LABOR AND DELIVERY | Age: 33
End: 2018-02-28
Payer: COMMERCIAL

## 2018-02-28 ENCOUNTER — HOSPITAL ENCOUNTER (INPATIENT)
Age: 33
LOS: 2 days | Discharge: HOME | End: 2018-03-02
Attending: OBSTETRICS & GYNECOLOGY | Admitting: OBSTETRICS & GYNECOLOGY
Payer: COMMERCIAL

## 2018-02-28 PROBLEM — Z3A.39 39 WEEKS GESTATION OF PREGNANCY: Status: ACTIVE | Noted: 2018-02-28

## 2018-02-28 LAB
ABO + RH BLD: NORMAL
BLOOD GROUP ANTIBODIES SERPL: NORMAL
ERYTHROCYTE [DISTWIDTH] IN BLOOD BY AUTOMATED COUNT: 14.1 % (ref 11.9–14.6)
HCT VFR BLD AUTO: 39.6 % (ref 35.8–46.3)
HGB BLD-MCNC: 13.5 G/DL (ref 11.7–15.4)
MCH RBC QN AUTO: 29.7 PG (ref 26.1–32.9)
MCHC RBC AUTO-ENTMCNC: 34.1 G/DL (ref 31.4–35)
MCV RBC AUTO: 87.2 FL (ref 79.6–97.8)
PLATELET # BLD AUTO: 172 K/UL (ref 150–450)
PMV BLD AUTO: 11.9 FL (ref 10.8–14.1)
RBC # BLD AUTO: 4.54 M/UL (ref 4.05–5.25)
SPECIMEN EXP DATE BLD: NORMAL
WBC # BLD AUTO: 7.1 K/UL (ref 4.3–11.1)

## 2018-02-28 PROCEDURE — 77030018846 HC SOL IRR STRL H20 ICUM -A: Performed by: OBSTETRICS & GYNECOLOGY

## 2018-02-28 PROCEDURE — 74011250636 HC RX REV CODE- 250/636: Performed by: ANESTHESIOLOGY

## 2018-02-28 PROCEDURE — 74011250636 HC RX REV CODE- 250/636

## 2018-02-28 PROCEDURE — 77030011943: Performed by: OBSTETRICS & GYNECOLOGY

## 2018-02-28 PROCEDURE — 76010000391 HC C SECN FIRST 1 HR: Performed by: OBSTETRICS & GYNECOLOGY

## 2018-02-28 PROCEDURE — 85027 COMPLETE CBC AUTOMATED: CPT | Performed by: OBSTETRICS & GYNECOLOGY

## 2018-02-28 PROCEDURE — 76010000392 HC C SECN EA ADDL 0.5 HR: Performed by: OBSTETRICS & GYNECOLOGY

## 2018-02-28 PROCEDURE — 77030003665 HC NDL SPN BBMI -A: Performed by: ANESTHESIOLOGY

## 2018-02-28 PROCEDURE — 4A1HXCZ MONITORING OF PRODUCTS OF CONCEPTION, CARDIAC RATE, EXTERNAL APPROACH: ICD-10-PCS | Performed by: OBSTETRICS & GYNECOLOGY

## 2018-02-28 PROCEDURE — 77030018836 HC SOL IRR NACL ICUM -A: Performed by: OBSTETRICS & GYNECOLOGY

## 2018-02-28 PROCEDURE — 74011250636 HC RX REV CODE- 250/636: Performed by: OBSTETRICS & GYNECOLOGY

## 2018-02-28 PROCEDURE — 77030032490 HC SLV COMPR SCD KNE COVD -B

## 2018-02-28 PROCEDURE — 77030007880 HC KT SPN EPDRL BBMI -B: Performed by: ANESTHESIOLOGY

## 2018-02-28 PROCEDURE — 75410000003 HC RECOV DEL/VAG/CSECN EA 0.5 HR: Performed by: OBSTETRICS & GYNECOLOGY

## 2018-02-28 PROCEDURE — 77030002933 HC SUT MCRYL J&J -A: Performed by: OBSTETRICS & GYNECOLOGY

## 2018-02-28 PROCEDURE — 77030002974 HC SUT PLN J&J -A: Performed by: OBSTETRICS & GYNECOLOGY

## 2018-02-28 PROCEDURE — 74011250637 HC RX REV CODE- 250/637: Performed by: ANESTHESIOLOGY

## 2018-02-28 PROCEDURE — 74011000250 HC RX REV CODE- 250

## 2018-02-28 PROCEDURE — 77030011640 HC PAD GRND REM COVD -A: Performed by: OBSTETRICS & GYNECOLOGY

## 2018-02-28 PROCEDURE — 77030032490 HC SLV COMPR SCD KNE COVD -B: Performed by: OBSTETRICS & GYNECOLOGY

## 2018-02-28 PROCEDURE — 77030005518 HC CATH URETH FOL 2W BARD -B

## 2018-02-28 PROCEDURE — 86900 BLOOD TYPING SEROLOGIC ABO: CPT | Performed by: OBSTETRICS & GYNECOLOGY

## 2018-02-28 PROCEDURE — 76060000078 HC EPIDURAL ANESTHESIA: Performed by: OBSTETRICS & GYNECOLOGY

## 2018-02-28 PROCEDURE — 77030031139 HC SUT VCRL2 J&J -A: Performed by: OBSTETRICS & GYNECOLOGY

## 2018-02-28 PROCEDURE — 65270000029 HC RM PRIVATE

## 2018-02-28 RX ORDER — CEFAZOLIN SODIUM/WATER 2 G/20 ML
2 SYRINGE (ML) INTRAVENOUS ONCE
Status: COMPLETED | OUTPATIENT
Start: 2018-02-28 | End: 2018-02-28

## 2018-02-28 RX ORDER — EPHEDRINE SULFATE 50 MG/ML
INJECTION, SOLUTION INTRAVENOUS AS NEEDED
Status: DISCONTINUED | OUTPATIENT
Start: 2018-02-28 | End: 2018-02-28 | Stop reason: HOSPADM

## 2018-02-28 RX ORDER — SODIUM CHLORIDE 0.9 % (FLUSH) 0.9 %
5-10 SYRINGE (ML) INJECTION AS NEEDED
Status: DISCONTINUED | OUTPATIENT
Start: 2018-02-28 | End: 2018-03-01

## 2018-02-28 RX ORDER — OXYTOCIN/RINGER'S LACTATE 30/500 ML
PLASTIC BAG, INJECTION (ML) INTRAVENOUS
Status: DISCONTINUED | OUTPATIENT
Start: 2018-02-28 | End: 2018-02-28 | Stop reason: HOSPADM

## 2018-02-28 RX ORDER — SODIUM CHLORIDE 0.9 % (FLUSH) 0.9 %
5-10 SYRINGE (ML) INJECTION EVERY 8 HOURS
Status: DISCONTINUED | OUTPATIENT
Start: 2018-02-28 | End: 2018-03-01

## 2018-02-28 RX ORDER — MORPHINE SULFATE 0.5 MG/ML
INJECTION, SOLUTION EPIDURAL; INTRATHECAL; INTRAVENOUS AS NEEDED
Status: DISCONTINUED | OUTPATIENT
Start: 2018-02-28 | End: 2018-02-28 | Stop reason: HOSPADM

## 2018-02-28 RX ORDER — TRISODIUM CITRATE DIHYDRATE AND CITRIC ACID MONOHYDRATE 500; 334 MG/5ML; MG/5ML
30 SOLUTION ORAL ONCE
Status: COMPLETED | OUTPATIENT
Start: 2018-02-28 | End: 2018-02-28

## 2018-02-28 RX ORDER — KETOROLAC TROMETHAMINE 30 MG/ML
30 INJECTION, SOLUTION INTRAMUSCULAR; INTRAVENOUS
Status: DISCONTINUED | OUTPATIENT
Start: 2018-02-28 | End: 2018-03-01 | Stop reason: ALTCHOICE

## 2018-02-28 RX ORDER — DEXTROSE, SODIUM CHLORIDE, SODIUM LACTATE, POTASSIUM CHLORIDE, AND CALCIUM CHLORIDE 5; .6; .31; .03; .02 G/100ML; G/100ML; G/100ML; G/100ML; G/100ML
125 INJECTION, SOLUTION INTRAVENOUS CONTINUOUS
Status: DISCONTINUED | OUTPATIENT
Start: 2018-02-28 | End: 2018-02-28 | Stop reason: HOSPADM

## 2018-02-28 RX ORDER — BUPIVACAINE HYDROCHLORIDE 7.5 MG/ML
INJECTION, SOLUTION INTRASPINAL AS NEEDED
Status: DISCONTINUED | OUTPATIENT
Start: 2018-02-28 | End: 2018-02-28 | Stop reason: HOSPADM

## 2018-02-28 RX ORDER — SODIUM CHLORIDE 0.9 % (FLUSH) 0.9 %
5-10 SYRINGE (ML) INJECTION EVERY 8 HOURS
Status: DISCONTINUED | OUTPATIENT
Start: 2018-02-28 | End: 2018-02-28 | Stop reason: HOSPADM

## 2018-02-28 RX ORDER — SODIUM CHLORIDE, SODIUM LACTATE, POTASSIUM CHLORIDE, CALCIUM CHLORIDE 600; 310; 30; 20 MG/100ML; MG/100ML; MG/100ML; MG/100ML
50 INJECTION, SOLUTION INTRAVENOUS CONTINUOUS
Status: DISCONTINUED | OUTPATIENT
Start: 2018-02-28 | End: 2018-03-01 | Stop reason: ALTCHOICE

## 2018-02-28 RX ORDER — GLYCOPYRROLATE 0.2 MG/ML
INJECTION INTRAMUSCULAR; INTRAVENOUS AS NEEDED
Status: DISCONTINUED | OUTPATIENT
Start: 2018-02-28 | End: 2018-02-28 | Stop reason: HOSPADM

## 2018-02-28 RX ORDER — FAMOTIDINE 20 MG/1
20 TABLET, FILM COATED ORAL ONCE
Status: COMPLETED | OUTPATIENT
Start: 2018-02-28 | End: 2018-02-28

## 2018-02-28 RX ORDER — HYDROMORPHONE HYDROCHLORIDE 2 MG/ML
1 INJECTION, SOLUTION INTRAMUSCULAR; INTRAVENOUS; SUBCUTANEOUS
Status: DISCONTINUED | OUTPATIENT
Start: 2018-02-28 | End: 2018-03-01 | Stop reason: ALTCHOICE

## 2018-02-28 RX ORDER — ONDANSETRON 2 MG/ML
INJECTION INTRAMUSCULAR; INTRAVENOUS AS NEEDED
Status: DISCONTINUED | OUTPATIENT
Start: 2018-02-28 | End: 2018-02-28 | Stop reason: HOSPADM

## 2018-02-28 RX ORDER — NALOXONE HYDROCHLORIDE 0.4 MG/ML
0.4 INJECTION, SOLUTION INTRAMUSCULAR; INTRAVENOUS; SUBCUTANEOUS
Status: DISCONTINUED | OUTPATIENT
Start: 2018-02-28 | End: 2018-03-01 | Stop reason: ALTCHOICE

## 2018-02-28 RX ORDER — ONDANSETRON 2 MG/ML
4 INJECTION INTRAMUSCULAR; INTRAVENOUS
Status: DISCONTINUED | OUTPATIENT
Start: 2018-02-28 | End: 2018-03-01 | Stop reason: ALTCHOICE

## 2018-02-28 RX ORDER — OXYCODONE HYDROCHLORIDE 5 MG/1
10 TABLET ORAL
Status: DISCONTINUED | OUTPATIENT
Start: 2018-02-28 | End: 2018-03-01 | Stop reason: ALTCHOICE

## 2018-02-28 RX ORDER — SODIUM CHLORIDE 0.9 % (FLUSH) 0.9 %
5-10 SYRINGE (ML) INJECTION AS NEEDED
Status: DISCONTINUED | OUTPATIENT
Start: 2018-02-28 | End: 2018-02-28 | Stop reason: HOSPADM

## 2018-02-28 RX ORDER — OXYTOCIN/0.9 % SODIUM CHLORIDE 30/500 ML
250 PLASTIC BAG, INJECTION (ML) INTRAVENOUS ONCE
Status: DISCONTINUED | OUTPATIENT
Start: 2018-02-28 | End: 2018-02-28 | Stop reason: HOSPADM

## 2018-02-28 RX ORDER — SODIUM CHLORIDE, SODIUM LACTATE, POTASSIUM CHLORIDE, CALCIUM CHLORIDE 600; 310; 30; 20 MG/100ML; MG/100ML; MG/100ML; MG/100ML
INJECTION, SOLUTION INTRAVENOUS
Status: DISCONTINUED | OUTPATIENT
Start: 2018-02-28 | End: 2018-02-28 | Stop reason: HOSPADM

## 2018-02-28 RX ORDER — NALBUPHINE HYDROCHLORIDE 10 MG/ML
5 INJECTION, SOLUTION INTRAMUSCULAR; INTRAVENOUS; SUBCUTANEOUS
Status: DISCONTINUED | OUTPATIENT
Start: 2018-02-28 | End: 2018-03-01 | Stop reason: ALTCHOICE

## 2018-02-28 RX ORDER — METOCLOPRAMIDE 10 MG/1
10 TABLET ORAL ONCE
Status: COMPLETED | OUTPATIENT
Start: 2018-02-28 | End: 2018-02-28

## 2018-02-28 RX ORDER — KETOROLAC TROMETHAMINE 30 MG/ML
INJECTION, SOLUTION INTRAMUSCULAR; INTRAVENOUS AS NEEDED
Status: DISCONTINUED | OUTPATIENT
Start: 2018-02-28 | End: 2018-02-28 | Stop reason: HOSPADM

## 2018-02-28 RX ORDER — SERTRALINE HYDROCHLORIDE 50 MG/1
50 TABLET, FILM COATED ORAL DAILY
Status: DISCONTINUED | OUTPATIENT
Start: 2018-03-01 | End: 2018-03-02 | Stop reason: HOSPADM

## 2018-02-28 RX ADMIN — FAMOTIDINE 20 MG: 20 TABLET, FILM COATED ORAL at 09:14

## 2018-02-28 RX ADMIN — SODIUM CHLORIDE, SODIUM LACTATE, POTASSIUM CHLORIDE, CALCIUM CHLORIDE: 600; 310; 30; 20 INJECTION, SOLUTION INTRAVENOUS at 10:53

## 2018-02-28 RX ADMIN — METOCLOPRAMIDE HYDROCHLORIDE 10 MG: 10 TABLET ORAL at 09:14

## 2018-02-28 RX ADMIN — Medication 2 G: at 10:32

## 2018-02-28 RX ADMIN — SODIUM CITRATE AND CITRIC ACID MONOHYDRATE 30 ML: 500; 334 SOLUTION ORAL at 09:14

## 2018-02-28 RX ADMIN — OXYCODONE HYDROCHLORIDE 5 MG: 5 TABLET ORAL at 22:05

## 2018-02-28 RX ADMIN — BUPIVACAINE HYDROCHLORIDE 1.7 ML: 7.5 INJECTION, SOLUTION INTRASPINAL at 11:15

## 2018-02-28 RX ADMIN — KETOROLAC TROMETHAMINE 30 MG: 30 INJECTION, SOLUTION INTRAMUSCULAR; INTRAVENOUS at 11:51

## 2018-02-28 RX ADMIN — ONDANSETRON 4 MG: 2 INJECTION INTRAMUSCULAR; INTRAVENOUS at 11:30

## 2018-02-28 RX ADMIN — EPHEDRINE SULFATE 10 MG: 50 INJECTION, SOLUTION INTRAVENOUS at 11:17

## 2018-02-28 RX ADMIN — SODIUM CHLORIDE, SODIUM LACTATE, POTASSIUM CHLORIDE, AND CALCIUM CHLORIDE 50 ML/HR: 600; 310; 30; 20 INJECTION, SOLUTION INTRAVENOUS at 19:27

## 2018-02-28 RX ADMIN — Medication 500 ML/HR: at 11:28

## 2018-02-28 RX ADMIN — MORPHINE SULFATE 150 MCG: 0.5 INJECTION, SOLUTION EPIDURAL; INTRATHECAL; INTRAVENOUS at 11:15

## 2018-02-28 RX ADMIN — KETOROLAC TROMETHAMINE 30 MG: 30 INJECTION, SOLUTION INTRAMUSCULAR at 19:21

## 2018-02-28 RX ADMIN — GLYCOPYRROLATE 0.2 MG: 0.2 INJECTION INTRAMUSCULAR; INTRAVENOUS at 11:18

## 2018-02-28 NOTE — H&P
History & Physical    Name: Garland Romo MRN: [id number]  SSN: [id number]    YOB: 1985  Age: 33 y.o. Sex: female      Subjective:     Estimated Date of Delivery: 3/7/18  OB History    Para Term  AB Living   1 0 0 0 0 0   SAB TAB Ectopic Molar Multiple Live Births   0 0 0 0 0 0      # Outcome Date GA Lbr Eugene/2nd Weight Sex Delivery Anes PTL Lv   1 Current                   Ms. Barbara Renner admitted with pregnancy at 39w0d for  section due to breech. Prenatal course was complicated by obesity, hx of LEEP, GERD, anxiety and depression. Please see prenatal records for details. Past Medical History:   Diagnosis Date    Abnormal Papanicolaou smear of cervix     Anxiety     Depression     Elevated prolactin level (HCC)     Gastric ulcer     GERD (gastroesophageal reflux disease)     Infertility, female     anovulation, male factor    LGSIL (low grade squamous intraepithelial dysplasia)     Morbid obesity (HCC)     Varicose vein of leg     RLE     Past Surgical History:   Procedure Laterality Date    HX DILATION AND CURETTAGE  16    Hysteroscopy/polypectomy    HX GYN       IUI's    HX HYSTEROSCOPY      uterine polyps    HX LEEP PROCEDURE      HX PELVIC LAPAROSCOPY  16    Laser     Social History     Occupational History    Not on file. Social History Main Topics    Smoking status: Former Smoker    Smokeless tobacco: Never Used      Comment: former some day smoker for about 2 yrs in her 20's    Alcohol use No      Comment: none in pregnancy    Drug use: No    Sexual activity: Yes     Partners: Male     Birth control/ protection: None     Family History   Problem Relation Age of Onset    Other Mother      Colitis    No Known Problems Father     Arthritis-osteo Brother      Rheumatoid    Celiac Disease Sister 30       No Known Allergies  Prior to Admission medications    Medication Sig Start Date End Date Taking?  Authorizing Provider   sertraline (ZOLOFT) 50 mg tablet TAKE 1 TABLET BY MOUTH DAILY. INDICATIONS: ANXIETY WITH DEPRESSION 17  Yes Umu Malone MD   lansoprazole (PREVACID) 15 mg capsule Take  by mouth Daily (before breakfast). Yes Historical Provider   PNV#16-Iron Fum & PS-FA-OM-3 35-1-200 mg cap Take  by mouth. Yes Historical Provider   Blood-Glucose Meter monitoring kit Check qid one hour after each meal and before bedtime 18   Umu Malone MD   Lancets misc Check qid one hour after each meal and before bedtime 18   Umu Malone MD   glucose blood VI test strips (ASCENSIA AUTODISC VI, ONE TOUCH ULTRA TEST VI) strip Check qid one hour after each meal and before bedtime 18   Umu Malone MD        Review of Systems: A comprehensive review of systems was negative except for that written in the History of Present Illness. Objective:     Vitals:  Vitals:    18 0809   BP: 124/79   Pulse: 83   Resp: 16   Temp: 98.5 °F (36.9 °C)        Physical Exam:  Patient without distress. Heart: Regular rate  Lung: normal respiratory effort  Abdomen: soft, nontender, gravid  Cervical Exam: Deferred  Lower Extremities: no c/ct  Membranes:  Intact  Fetal Heart Rate: Reactive    Prenatal Labs:   Lab Results   Component Value Date/Time    ABO/Rh(D) AB POSITIVE 2018 08:02 AM    Rubella, External 2.44 2017    GrBStrep, External Negative 2018    HBsAg, External Negative 2017    HIV, External Non reactive 2017    RPR, External Non Reactive 2017    Gonorrhea, External Negative 2017    Chlamydia, External Negative 2017    ABO,Rh AB Positive 2017         Impression/Plan:     Plan:  Admit for  section. Group B Strep was negative. Discussed the risks of surgery including the risks of bleeding, infection, deep vein thrombosis, and surgical injuries to internal organs including but not limited to the bowels, bladder, rectum, and female reproductive organs.  The patient understands the risks; any and all questions were answered to the patient's satisfaction. Breech confirmed at bedside.     Signed By:  Scarlet Ibrahim MD     February 28, 2018

## 2018-02-28 NOTE — LACTATION NOTE

## 2018-02-28 NOTE — LACTATION NOTE
This note was copied from a baby's chart. In to assist with latch per mom and RN request. RN had assisted with attempt but no latch yet. Baby born less than 3 hours ago. Has not successfully latched yet. Baby skin to skin with mom and awake. Suck assessment completed- baby would not suck on gloved finger, tongue back behind lower gumline, no obvious tight frenulum noted. Assisted in football hold on both breasts. Large breasts, short nipples, breast tissue easily compressed. Baby would open and put mouth on nipple but no latch or suck at this time. Attempted for about 15 minutes, no latch achieved. Baby swaddled and handed to family member per mom request. Reviewed expectations for first 24 hours of life. If baby has not latched by 12 hours, can proceed with pumping initiation for milk supply. Mom agreeable. Reviewed feeding cues, feeding on demand and skin to skin contact as much as possible. RN updated.  Baby needs more time to learn to latch well, lactation to continue to actively assist.

## 2018-02-28 NOTE — ANESTHESIA POSTPROCEDURE EVALUATION
Post-Anesthesia Evaluation and Assessment    Patient: Suri Tristan MRN: [id number]  SSN: [id number]    YOB: 1985  Age: 33 y.o. Sex: female       Cardiovascular Function/Vital Signs  Visit Vitals    /65    Pulse 67    Temp 36.7 °C (98 °F)    Resp 18    SpO2 96%       Patient is status post No value filed. anesthesia for Procedure(s):   SECTION. Nausea/Vomiting: None    Postoperative hydration reviewed and adequate. Pain:  Pain Scale 1: Numeric (0 - 10) (18 1300)  Pain Intensity 1: 0 (18 1300)   Managed    Neurological Status:   Neuro  LLE Motor Response: Pharmacologically paralyzed (18 1300)  RLE Motor Response: Pharmacologically paralyzed (18 1300)   At baseline    Mental Status and Level of Consciousness: Arousable    Pulmonary Status:   O2 Device: Room air (18 1300)   Adequate oxygenation and airway patent    Complications related to anesthesia: None    Post-anesthesia assessment completed.  No concerns    Signed By: Lamar Benjamin MD     2018

## 2018-02-28 NOTE — ANESTHESIA PROCEDURE NOTES
Spinal Block    Start time: 2/28/2018 9:07 AM  End time: 2/28/2018 9:16 AM  Performed by: Juanis Sharpe  Authorized by: Juanis Sharpe     Pre-procedure:   Indications: at surgeon's request and primary anesthetic  Preanesthetic Checklist: patient identified, risks and benefits discussed, anesthesia consent, site marked, patient being monitored and timeout performed    Timeout Time: 09:07          Spinal Block:   Patient Position:  Seated  Prep Region:  Lumbar  Prep: chlorhexidine and patient draped      Location:  L3-4  Technique:  Single shot  Local:  Lidocaine 1%  Local Dose (mL):  3    Needle:   Needle Type:  Pencan  Needle Gauge:  25 G  Attempts:  1      Events: CSF confirmed, no blood with aspiration and no paresthesia        Assessment:  Insertion:  Uncomplicated  Patient tolerance:  Patient tolerated the procedure well with no immediate complications

## 2018-02-28 NOTE — IP AVS SNAPSHOT
[facility] [address] 
[phone] Patient: Lokesh Schafer MRN: [id number] FATIMAH:[id number] About your hospitalization You were admitted on:  February 28, 2018 You last received care in the:  [address] [facility] You were discharged on:  March 2, 2018 Why you were hospitalized Your primary diagnosis was:  Breech Presentation Your diagnoses also included:  39 Weeks Gestation Of Pregnancy, Hx Leep (Loop Electrosurgical Excision Procedure), Cervix, Pregnancy, Second Trimester, High-Risk Pregnancy In Second Trimester Follow-up Information Follow up With Details Comments Contact Info None   None ([phone]) Patient stated that they have no PCP Maylin Canchola MD In 6 weeks call office to schedule an appointment to be seen in 6 weeks [address] [address] 
[phone] Your Scheduled Appointments Wednesday April 11, 2018 10:00 AM EDT POSTPARTUM VISIT with Maylin Canchola MD  
[facility]) [address] [id number] [phone] Discharge Orders None A check arelis indicates which time of day the medication should be taken. My Medications START taking these medications Instructions Each Dose to Equal  
 Morning Noon Evening Bedtime  
 cephALEXin 500 mg capsule Commonly known as:  Jc Ahumada Your last dose was: Your next dose is: Take 1 Cap by mouth four (4) times daily for 7 days. 500 mg  
    
   
   
   
  
 ibuprofen 800 mg tablet Commonly known as:  MOTRIN Your last dose was: Your next dose is: Take 1 Tab by mouth every six (6) hours as needed for Pain. 800 mg  
    
   
   
   
  
 ondansetron 8 mg disintegrating tablet Commonly known as:  ZOFRAN ODT Your last dose was: Your next dose is: Take 1 Tab by mouth every eight (8) hours as needed for Nausea for up to 5 days. 8 mg  
    
   
   
   
  
 oxyCODONE IR 5 mg immediate release tablet Commonly known as:  Ocie Men Your last dose was: Your next dose is: Take 1 Tab by mouth every four (4) hours as needed for Pain. Max Daily Amount: 30 mg.  
 5 mg  
    
   
   
   
  
 promethazine 12.5 mg tablet Commonly known as:  PHENERGAN Your last dose was: Your next dose is: Take 2 Tabs by mouth every six (6) hours as needed for Nausea for up to 7 days. 25 mg CONTINUE taking these medications Instructions Each Dose to Equal  
 Morning Noon Evening Bedtime PN#16-Iron Fum & PS-FA-OM-3 35-1-200 mg Cap Your last dose was: Your next dose is: Take  by mouth. PREVACID 15 mg capsule Generic drug:  lansoprazole Your last dose was: Your next dose is: Take  by mouth Daily (before breakfast). sertraline 50 mg tablet Commonly known as:  ZOLOFT Your last dose was: Your next dose is: TAKE 1 TABLET BY MOUTH DAILY. INDICATIONS: ANXIETY WITH DEPRESSION  
     
   
   
   
  
  
STOP taking these medications Blood-Glucose Meter monitoring kit  
   
  
 glucose blood VI test strips strip Commonly known as:  ASCENSIA AUTODISC VI, ONE TOUCH ULTRA TEST VI Lancets Misc Where to Get Your Medications These medications were sent to [address] [place] [address], [facility] [address] [address] Phone:  [phone]  
  cephALEXin 500 mg capsule Information on where to get these meds will be given to you by the nurse or doctor. ! Ask your nurse or doctor about these medications  
  ibuprofen 800 mg tablet  
 ondansetron 8 mg disintegrating tablet oxyCODONE IR 5 mg immediate release tablet  
 promethazine 12.5 mg tablet Discharge Instructions After Your Delivery (the Postpartum Period): Care Instructions Your Care Instructions Congratulations on the birth of your baby. Like pregnancy, the  period can be a time of excitement, john, and exhaustion. You may look at your wondrous little baby and feel happy. You may also be overwhelmed by your new sleep hours and new responsibilities. At first, babies often sleep during the days and are awake at night. They do not have a pattern or routine. They may make sudden gasps, jerk themselves awake, or look like they have crossed eyes. These are all normal, and they may even make you smile. In these first weeks after delivery, try to take good care of yourself. It may take 4 to 6 weeks to feel like yourself again, and possibly longer if you had a  birth. You will likely feel very tired for several weeks. Your days will be full of ups and downs, but lots of john as well. Follow-up care is a key part of your treatment and safety. Be sure to make and go to all appointments, and call your doctor if you are having problems. It's also a good idea to know your test results and keep a list of the medicines you take. How can you care for yourself at home? Take care of your body after delivery · Use pads instead of tampons for the bloody flow that may last as long as 2 weeks. · Ease cramps with ibuprofen (Advil, Motrin). · Ease soreness of hemorrhoids and the area between your vagina and rectum with ice compresses or witch hazel pads. · Ease constipation by drinking lots of fluid and eating high-fiber foods. Ask your doctor about over-the-counter stool softeners. · Cleanse yourself with a gentle squeeze of warm water from a bottle instead of wiping with toilet paper. · Take a sitz bath in warm water several times a day. · Wear a good nursing bra. Ease sore and swollen breasts with warm, wet washcloths. · If you are not breastfeeding, use ice rather than heat for breast soreness. · Your period may not start for several months if you are breastfeeding. You may bleed more, and longer at first, than you did before you got pregnant. · Wait until you are healed (about 4 to 6 weeks) before you have sexual intercourse. Your doctor will tell you when it is okay to have sex. · Try not to travel with your baby for 5 or 6 weeks. If you take a long car trip, make frequent stops to walk around and stretch. Avoid exhaustion · Rest every day. Try to nap when your baby naps. · Ask another adult to be with you for a few days after delivery. · Plan for  if you have other children. · Stay flexible so you can eat at odd hours and sleep when you need to. Both you and your baby are making new schedules. · Plan small trips to get out of the house. Change can make you feel less tired. · Ask for help with housework, cooking, and shopping. Remind yourself that your job is to care for your baby. Know about help for postpartum depression · \"Baby blues\" are common for the first 1 to 2 weeks after birth. You may cry or feel sad or irritable for no reason. · Rest whenever you can. Being tired makes it harder to handle your emotions. · Go for walks with your baby. · Talk to your partner, friends, and family about your feelings. · If your symptoms last for more than a few weeks, or if you feel very depressed, ask your doctor for help. · Postpartum depression can be treated. Support groups and counseling can help. Sometimes medicine can also help. Stay healthy · Eat healthy foods so you have more energy, make good breast milk, and lose extra baby pounds. · If you breastfeed, avoid alcohol and drugs. Stay smoke-free. If you quit during pregnancy, congratulations. · Start daily exercise after 4 to 6 weeks, but rest when you feel tired. · Learn exercises to tone your belly. Do Kegel exercises to regain strength in your pelvic muscles. You can do these exercises while you stand or sit. ¨ Squeeze the same muscles you would use to stop your urine. Your belly and thighs should not move. ¨ Hold the squeeze for 3 seconds, and then relax for 3 seconds. ¨ Start with 3 seconds. Then add 1 second each week until you are able to squeeze for 10 seconds. ¨ Repeat the exercise 10 to 15 times for each session. Do three or more sessions each day. · Find a class for new mothers and new babies that has an exercise time. · If you had a  birth, give yourself a bit more time before you exercise, and be careful. When should you call for help? Call 911 anytime you think you may need emergency care. For example, call if: 
? · You passed out (lost consciousness). ?Call your doctor now or seek immediate medical care if: 
? · You have severe vaginal bleeding. This means you are passing blood clots and soaking through a pad each hour for 2 or more hours. ? · You are dizzy or lightheaded, or you feel like you may faint. ? · You have a fever. ? · You have new belly pain, or your pain gets worse. ? Watch closely for changes in your health, and be sure to contact your doctor if: 
? · Your vaginal bleeding seems to be getting heavier. ? · You have new or worse vaginal discharge. ? · You feel sad, anxious, or hopeless for more than a few days. ? · You do not get better as expected. Where can you learn more? Go to http://luis-micheal.info/. Enter A461 in the search box to learn more about \"After Your Delivery (the Postpartum Period): Care Instructions. \" Current as of: 2017 Content Version: 11.4 © 5163-4929 [employer]. Care instructions adapted under license by [employer] (which disclaims liability or warranty for this information).  If you have questions about a medical condition or this instruction, always ask your healthcare professional. [address] any warranty or liability for your use of this information.  Section: What to Expect at [facility] Your Recovery A  section, or , is surgery to deliver your baby through a cut, called an incision, that the doctor makes in your lower belly and uterus. You may have some pain in your lower belly and need pain medicine for 1 to 2 weeks. You can expect some vaginal bleeding for several weeks. You will probably need about 6 weeks to fully recover. It is important to take it easy while the incision is healing. Avoid heavy lifting, strenuous activities, or exercises that strain the belly muscles while you are recovering. Ask a family member or friend for help with housework, cooking, and shopping. This care sheet gives you a general idea about how long it will take for you to recover. But each person recovers at a different pace. Follow the steps below to get better as quickly as possible. How can you care for yourself at home? Activity ? · Rest when you feel tired. Getting enough sleep will help you recover. ? · Try to walk each day. Start by walking a little more than you did the day before. Bit by bit, increase the amount you walk. Walking boosts blood flow and helps prevent pneumonia, constipation, and blood clots. ? · Avoid strenuous activities, such as bicycle riding, jogging, weightlifting, and aerobic exercise, for 6 weeks or until your doctor says it is okay. ? · Until your doctor says it is okay, do not lift anything heavier than your baby. ? · Do not do sit-ups or other exercises that strain the belly muscles for 6 weeks or until your doctor says it is okay. ? · Hold a pillow over your incision when you cough or take deep breaths. This will support your belly and decrease your pain. ? · You may shower as usual. Pat the incision dry when you are done. ? · You will have some vaginal bleeding. Wear sanitary pads. Do not douche or use tampons until your doctor says it is okay. ? · Ask your doctor when you can drive again. ? · You will probably need to take at least 6 weeks off work. It depends on the type of work you do and how you feel. ? · Ask your doctor when it is okay for you to have sex. Diet ? · You can eat your normal diet. If your stomach is upset, try bland, low-fat foods like plain rice, broiled chicken, toast, and yogurt. ? · Drink plenty of fluids (unless your doctor tells you not to). ? · You may notice that your bowel movements are not regular right after your surgery. This is common. Try to avoid constipation and straining with bowel movements. You may want to take a fiber supplement every day. If you have not had a bowel movement after a couple of days, ask your doctor about taking a mild laxative. ? · If you are breastfeeding, do not drink any alcohol. Medicines ? · Your doctor will tell you if and when you can restart your medicines. He or she will also give you instructions about taking any new medicines. ? · If you take blood thinners, such as warfarin (Coumadin), clopidogrel (Plavix), or aspirin, be sure to talk to your doctor. He or she will tell you if and when to start taking those medicines again. Make sure that you understand exactly what your doctor wants you to do. ? · Take pain medicines exactly as directed. ¨ If the doctor gave you a prescription medicine for pain, take it as prescribed. ¨ If you are not taking a prescription pain medicine, ask your doctor if you can take an over-the-counter medicine. ? · If you think your pain medicine is making you sick to your stomach: 
¨ Take your medicine after meals (unless your doctor has told you not to). ¨ Ask your doctor for a different pain medicine. ? · If your doctor prescribed antibiotics, take them as directed.  Do not stop taking them just because you feel better. You need to take the full course of antibiotics. Incision care ? · If you have strips of tape on the incision, leave the tape on for a week or until it falls off.  
? · Wash the area daily with warm, soapy water, and pat it dry. Don't use hydrogen peroxide or alcohol, which can slow healing. You may cover the area with a gauze bandage if it weeps or rubs against clothing. Change the bandage every day. ? · Keep the area clean and dry. Other instructions ? · If you breastfeed your baby, you may be more comfortable while you are healing if you place the baby so that he or she is not resting on your belly. Try tucking your baby under your arm, with his or her body along the side you will be feeding on. Support your baby's upper body with your arm. With that hand you can control your baby's head to bring his or her mouth to your breast. This is sometimes called the football hold. Follow-up care is a key part of your treatment and safety. Be sure to make and go to all appointments, and call your doctor if you are having problems. It's also a good idea to know your test results and keep a list of the medicines you take. When should you call for help? Call 911 anytime you think you may need emergency care. For example, call if: 
? · You passed out (lost consciousness). ? · You have chest pain, are short of breath, or cough up blood. ?Call your doctor now or seek immediate medical care if: 
? · You have pain that does not get better after you take pain medicine. ? · You have severe vaginal bleeding. ? · You are dizzy or lightheaded, or you feel like you may faint. ? · You have new or worse pain in your belly or pelvis. ? · You have loose stitches, or your incision comes open. ? · You have symptoms of infection, such as: 
¨ Increased pain, swelling, warmth, or redness. ¨ Red streaks leading from the incision. ¨ Pus draining from the incision. ¨ A fever. ? · You have symptoms of a blood clot in your leg (called a deep vein thrombosis), such as: 
¨ Pain in your calf, back of the knee, thigh, or groin. ¨ Redness and swelling in your leg or groin. ? Watch closely for changes in your health, and be sure to contact your doctor if: 
? · You do not get better as expected. Where can you learn more? Go to http://luis-micheal.info/. Enter M806 in the search box to learn more about \" Section: What to Expect at Home. \" Current as of: 2017 Content Version: 11.4 © 1227-6087 [email or web address]. Care instructions adapted under license by [employer] (which disclaims liability or warranty for this information). If you have questions about a medical condition or this instruction, always ask your healthcare professional. [place] [address] any warranty or liability for your use of this information. DISCHARGE SUMMARY from Nurse PATIENT INSTRUCTIONS: 
 
After general anesthesia or intravenous sedation, for 24 hours or while taking prescription Narcotics: · Limit your activities · Do not drive and operate hazardous machinery · Do not make important personal or business decisions · Do  not drink alcoholic beverages · If you have not urinated within 8 hours after discharge, please contact your surgeon on call. Report the following to your surgeon: 
· Excessive pain, swelling, redness or odor of or around the surgical area · Temperature over 100.5 · Nausea and vomiting lasting longer than 4 hours or if unable to take medications · Any signs of decreased circulation or nerve impairment to extremity: change in color, persistent  numbness, tingling, coldness or increase pain · Any questions What to do at Home: 
 
Discharge instruction to follow: Activity: Pelvis rest for 6 weeks No heavy lifting over 15 lbs for 2 weeks No driving for 2 weeks No push/pull motion such as sweeping or vacuuming for 2 weeks No tub baths for 6 weeks  section keep incision clean and dry, may shower as normal with soap and water. Inspect incision every day for signs of infection listed below. Continue to use jovanny-bottle with every void or bowel movement until comfortable stopping. Change sanitary pad after each urination or bowel movement. Call MD for the following: 
    Fever over 101 F; pain not relieved by medication; foul smelling vaginal discharge or increase in vaginal bleeding. Redness, swelling, or drainage from  incision. Take medication as prescribed. Follow up with MD as order. *  Please give a list of your current medications to your Primary Care Provider. *  Please update this list whenever your medications are discontinued, doses are 
    changed, or new medications (including over-the-counter products) are added. *  Please carry medication information at all times in case of emergency situations. These are general instructions for a healthy lifestyle: No smoking/ No tobacco products/ Avoid exposure to second hand smoke Surgeon General's Warning:  Quitting smoking now greatly reduces serious risk to your health. Obesity, smoking, and sedentary lifestyle greatly increases your risk for illness A healthy diet, regular physical exercise & weight monitoring are important for maintaining a healthy lifestyle You may be retaining fluid if you have a history of heart failure or if you experience any of the following symptoms:  Weight gain of 3 pounds or more overnight or 5 pounds in a week, increased swelling in our hands or feet or shortness of breath while lying flat in bed. Please call your doctor as soon as you notice any of these symptoms; do not wait until your next office visit. Recognize signs and symptoms of STROKE: 
 
F-face looks uneven A-arms unable to move or move unevenly S-speech slurred or non-existent T-time-call 911 as soon as signs and symptoms begin-DO NOT go Back to bed or wait to see if you get better-TIME IS BRAIN. Warning Signs of HEART ATTACK Call 911 if you have these symptoms: 
? Chest discomfort. Most heart attacks involve discomfort in the center of the chest that lasts more than a few minutes, or that goes away and comes back. It can feel like uncomfortable pressure, squeezing, fullness, or pain. ? Discomfort in other areas of the upper body. Symptoms can include pain or discomfort in one or both arms, the back, neck, jaw, or stomach. ? Shortness of breath with or without chest discomfort. ? Other signs may include breaking out in a cold sweat, nausea, or lightheadedness. Don't wait more than five minutes to call [address]! Fast action can save your life. Calling 911 is almost always the fastest way to get lifesaving treatment. Emergency Medical Services staff can begin treatment when they arrive  up to an hour sooner than if someone gets to the hospital by car. The discharge information has been reviewed with the patient. The patient verbalized understanding. Discharge medications reviewed with the patient and appropriate educational materials and side effects teaching were provided. ___________________________________________________________________________________________________________________________________ Introducing [facility] & HE[facility] SERVICES! Dear Francisco Javier Howell: Thank you for requesting a Soapbox account. Our records indicate that you already have an active Soapbox account. You can access your account anytime at [email or web address] Did you know that you can access your hospital and ER discharge instructions at any time in Soapbox? You can also review all of your test results from your hospital stay or ER visit. Additional Information If you have questions, please visit the Frequently Asked Questions section of the Solaicxhart website at [email or web address] Remember, MyChart is NOT to be used for urgent needs. For medical emergencies, dial 911. Now available from your iPhone and Android! Providers Seen During Your Hospitalization Provider Specialty Primary office phone Nata Heart MD Obstetrics & Gynecology [phone] Your Primary Care Physician (PCP) Primary Care Physician Office Phone Office Fax NONE ** None ** ** None ** You are allergic to the following No active allergies Recent Documentation Breastfeeding? OB Status Smoking Status Unknown Recent pregnancy Former Smoker Emergency Contacts Name Discharge Info Relation Home Work Mobile Kiran Cortés  Spouse [3] [phone] Dominik Anderson  Mother [14] [phone] Patient Belongings The following personal items are in your possession at time of discharge: 
  Dental Appliances: None  Visual Aid: Glasses      Home Medications: None   Jewelry: None  Clothing: Footwear, Shirt, Pants    Other Valuables: Anika Andrews Please provide this summary of care documentation to your next provider. Signatures-by signing, you are acknowledging that this After Visit Summary has been reviewed with you and you have received a copy. Patient Signature:  ____________________________________________________________ Date:  ____________________________________________________________  
  
Gasper November Provider Signature:  ____________________________________________________________ Date:  ____________________________________________________________

## 2018-02-28 NOTE — IP AVS SNAPSHOT
[address] 
[phone] Patient: Phill Bullard MRN: [id number] AIA:9/8/9015 A check arelis indicates which time of day the medication should be taken. My Medications START taking these medications Instructions Each Dose to Equal  
 Morning Noon Evening Bedtime  
 cephALEXin 500 mg capsule Commonly known as:  Nicole Garter Your last dose was: Your next dose is: Take 1 Cap by mouth four (4) times daily for 7 days. 500 mg  
    
   
   
   
  
 ibuprofen 800 mg tablet Commonly known as:  MOTRIN Your last dose was: Your next dose is: Take 1 Tab by mouth every six (6) hours as needed for Pain. 800 mg  
    
   
   
   
  
 ondansetron 8 mg disintegrating tablet Commonly known as:  ZOFRAN ODT Your last dose was: Your next dose is: Take 1 Tab by mouth every eight (8) hours as needed for Nausea for up to 5 days. 8 mg  
    
   
   
   
  
 oxyCODONE IR 5 mg immediate release tablet Commonly known as:  Katherine Miners Your last dose was: Your next dose is: Take 1 Tab by mouth every four (4) hours as needed for Pain. Max Daily Amount: 30 mg.  
 5 mg  
    
   
   
   
  
 promethazine 12.5 mg tablet Commonly known as:  PHENERGAN Your last dose was: Your next dose is: Take 2 Tabs by mouth every six (6) hours as needed for Nausea for up to 7 days. 25 mg CONTINUE taking these medications Instructions Each Dose to Equal  
 Morning Noon Evening Bedtime PNV#16-Iron Fum & PS-FA-OM-3 35-1-200 mg Cap Your last dose was: Your next dose is: Take  by mouth. PREVACID 15 mg capsule Generic drug:  lansoprazole Your last dose was: Your next dose is: Take  by mouth Daily (before breakfast). sertraline 50 mg tablet Commonly known as:  ZOLOFT Your last dose was: Your next dose is: TAKE 1 TABLET BY MOUTH DAILY. INDICATIONS: ANXIETY WITH DEPRESSION  
     
   
   
   
  
  
STOP taking these medications Blood-Glucose Meter monitoring kit  
   
  
 glucose blood VI test strips strip Commonly known as:  ASCENSIA AUTODISC VI, ONE TOUCH ULTRA TEST VI Lancets Misc Where to Get Your Medications These medications were sent to [address] [place] [address], [facility] [address], [address] [place] [address] Way [address] Phone:  [phone]  
  cephALEXin 500 mg capsule Information on where to get these meds will be given to you by the nurse or doctor. ! Ask your nurse or doctor about these medications  
  ibuprofen 800 mg tablet  
 ondansetron 8 mg disintegrating tablet  
 oxyCODONE IR 5 mg immediate release tablet  
 promethazine 12.5 mg tablet

## 2018-02-28 NOTE — ROUTINE PROCESS
SBAR OUT Report: Mother    Verbal report given to Sherman Thorpe (full name & credentials) on this patient, who is now being transferred to [facility] (unit) for routine progression of care. The patient is wearing a green \"Anesthesia-Duramorph\" band. Report consisted of patient's Situation, Background, Assessment and Recommendations (SBAR).  ID bands were compared with the identification form, and verified with the patient and receiving nurse. Information from the SBAR and the [facility] Report was reviewed with the receiving nurse; opportunity for questions and clarification provided.

## 2018-02-28 NOTE — L&D DELIVERY NOTE
LOW TRANSVERSE  SECTION   FULL OP NOTE    PATIENT: Nga Cruz  MRN: [id number]  DATE: 2018          PREOPERATIVE DIAGNOSIS:  28yo G1 at 44 0/7 weeks with breech fetus, declined ECV, pregnancy complicated by maternal obesiity, GBS neg    POSTOPERATIVE DIAGNOSIS: Same    PROCEDURE:  Primary low transverse  section    SURGEON: Eliezer Baeza MD    ANESTHESIA: Spinal    ESTIMATED BLOOD LOSS:   500    SPECIMENS: Cord blood, placenta sent for disposal    FINDINGS: Live vigorous female infant with APGARS 7, 9, weighing 6 lbs 15 oz. Intact placenta with normal 3-vessel cord. Normal uterus, tubes and ovaries postpartum. PROCEDURE IN DETAIL: Informed consent was obtained upon admission and reviewed prior to proceeding to the operating room. The patient was taken to the operating room where spinal anesthesia was found to be adequate. Time out was done to confirm the operating procedure, surgeon, patient and site. Once confirmed by the team, procedure was started. The patient was prepped and draped in the normal sterile fashion. A Pfannenstiel skin incision was made with a scalpel and carried down to the underlying fascia with the Bovie. The fascia was incised and extended bluntly. The rectus muscles were divided in the midline bluntly. The peritoneum was entered bluntly and extended. A bladder blade was then inserted. A low transverse uterine incision was made with the scalpel and extended laterally with blunt finger dissection. The fetal breech was then elevated to the hysterotomy and the fetus was delivered to the level of the umbilicus. The legs were then delivered atraumatically while using Pinard's maneuver and splinting the long bones. The body was supported and delivered to the level of the axillae. The fetus was rotated 90 degrees in a counter-clockwise direction.  The anterior arm was delivered atraumatically while splinting the long bones and sweeping it medially across the chest. The fetus was then rotated 180 degrees in a clockwise direction and the opposite arm was similarly delivered atraumatically while splinting the long bones. The head was then delivered atraumatically with flexion using the Llkdmitwg-Cwsugig-Fbal maneuver. Delayed cord clamping was utilized. The cord was clamped and cut. The baby was handed off to the awaiting  Intensive Care Unit staff. Respiratory was present for delivery. APGARS were 7 and 9 at one and five minutes respectively. The placenta was then delivered. The uterus was exteriorized and the uterus was cleared of all clots and debris. The uterine incision was closed in two layers; the first layer was a running locked layer of 0 Vicryl and the second layer was an imbricating layer of 0 Vicryl. Good hemostasis was assured. Paracolic gutters were washed with warm normal saline. The uterus was returned to the abdomen. The peritoneum was then closed with 2-0 Vicryl in a running fashion. A Vicryl suture was used to re-approximate the rectus muscles in the midline in an interrupted fashion. The fascia was closed with 0-Vicryl in a running fashion. Good hemostasis was assured throughout. The skin was closed with 4-0 Monocryl on a Akbar needle in a subcuticular fashion. The patient tolerated the procedure well. Sponge, lap, and needle counts were correct times two. The patient was taken to the recovery in stable condition. The infant was taken to the recovery room with the mother in stable condition as well. COMPLICATIONS: None    Delivery Summary    Patient: Suri Tristan MRN: [id number]  SSN: [id number]    YOB: 1985  Age: 33 y.o. Sex: female        Information for the patient's :  Livan Wild [[id number]]       Labor Events:    Labor: No   Rupture Date:     Rupture Time:     Rupture Type AROM   Amniotic Fluid Volume:  Moderate    Amniotic Fluid Description: Clear None   Induction: None       Augmentation: None   Labor Events: None     Cervical Ripening:     None     Delivery Events:  Episiotomy: None   Laceration(s): None     Repaired: None    Number of Repair Packets:     Suture Type and Size: None     Estimated Blood Loss (ml): 500.00ml       Delivery Date: 2018    Delivery Time: 11:27 AM  Delivery Type: , Low Transverse   Details    Trial of Labor: No   Primary/Repeat: Primary   Priority: Routine   Indications:  Breech   Incision type:     Sex:  Female     Gestational Age: 39w0d  Delivery Clinician:  Shawn Nixon  Living Status: Living   Delivery Location: OR            APGARS  One minute Five minutes Ten minutes   Skin color: 0   1        Heart rate: 2   2        Grimace: 2   2        Muscle tone: 2   2        Breathin   2        Totals: 7   9          Presentation: Breech    Position:        Resuscitation Method:  Suctioning-bulb; Tactile Stimulation;Suctioning-deep     Meconium Stained: None      Cord Vessels: 3 Vessels      Cord Events:    Cord Blood Sent?:  Yes    Blood Gases Sent?:  No    Placenta:  Date/Time:  11:28 AM  Removal: Expressed      Appearance: Normal;Intact     Farrell Measurements:  Birth Weight: 6 lb 15.5 oz (3.16 kg)      Birth Length: 49.5 cm      Head Circumference: 33 cm      Chest Circumference: 33.5 cm     Abdominal Girth:       Other Providers:   Landry PERLA;GER LAWRENCE;WENDY BENNETT;;;XIMENA MERCADO;COLIN HARGROVE;;;;GRETA ARNOLD;BRAYDEN HOBBS, Obstetrician;Primary Nurse;Primary Farrell Nurse;Nicu Nurse;Neonatologist;Anesthesiologist;Crna;Nurse Practitioner;Midwife;Nursery Nurse;Scrub Tech;Scrub Tech             Group B Strep:   Lab Results   Component Value Date/Time    GrBStrep, External Negative 2018     Information for the patient's :  [place] [[id number]]     Lab Results   Component Value Date/Time    ABO/Rh(D) A POSITIVE 2018 11:27 AM    MATT IgG NEG 2018 11:27 AM     No results found for: APH, APCO2, APO2, AHCO3, ABEC, ABDC, O2ST, EPHV, PCO2V, PO2V, HCO3V, EBEV, EBDV, SITE, RSCOM

## 2018-02-28 NOTE — ROUTINE PROCESS
SBAR IN Report: Mother    Verbal report received from JAYCE Mcnamara RN (full name & credentials) on this patient, who is now being transferred from [facility] (unit) for routine post - op. The patient is wearing a green \"Anesthesia-Duramorph\" band. Report consisted of patient's Situation, Background, Assessment and Recommendations (SBAR). [place] ID bands were compared with the identification form, and verified with the patient and transferring nurse. Information from the Procedure Summary and the [facility] Report was reviewed with the transferring nurse; opportunity for questions and clarification provided.

## 2018-02-28 NOTE — ANESTHESIA PREPROCEDURE EVALUATION
Anesthetic History   No history of anesthetic complications            Review of Systems / Medical History  Patient summary reviewed and pertinent labs reviewed    Pulmonary  Within defined limits                 Neuro/Psych   Within defined limits           Cardiovascular                  Exercise tolerance: >4 METS     GI/Hepatic/Renal     GERD: well controlled      PUD     Endo/Other        Morbid obesity     Other Findings              Physical Exam    Airway  Mallampati: II  TM Distance: 4 - 6 cm  Neck ROM: normal range of motion   Mouth opening: Normal     Cardiovascular    Rhythm: regular  Rate: normal         Dental  No notable dental hx       Pulmonary  Breath sounds clear to auscultation               Abdominal  GI exam deferred       Other Findings            Anesthetic Plan    ASA: 2  Anesthesia type: spinal      Post-op pain plan if not by surgeon: intrathecal opiates      Anesthetic plan and risks discussed with: Patient and Spouse

## 2018-03-01 LAB
HCT VFR BLD AUTO: 33.7 % (ref 35.8–46.3)
HGB BLD-MCNC: 11.5 G/DL (ref 11.7–15.4)

## 2018-03-01 PROCEDURE — 36415 COLL VENOUS BLD VENIPUNCTURE: CPT | Performed by: OBSTETRICS & GYNECOLOGY

## 2018-03-01 PROCEDURE — 65270000029 HC RM PRIVATE

## 2018-03-01 PROCEDURE — 74011250637 HC RX REV CODE- 250/637: Performed by: OBSTETRICS & GYNECOLOGY

## 2018-03-01 PROCEDURE — 74011250636 HC RX REV CODE- 250/636: Performed by: ANESTHESIOLOGY

## 2018-03-01 PROCEDURE — 85018 HEMOGLOBIN: CPT | Performed by: OBSTETRICS & GYNECOLOGY

## 2018-03-01 RX ORDER — NALOXONE HYDROCHLORIDE 0.4 MG/ML
0.4 INJECTION, SOLUTION INTRAMUSCULAR; INTRAVENOUS; SUBCUTANEOUS AS NEEDED
Status: DISCONTINUED | OUTPATIENT
Start: 2018-03-01 | End: 2018-03-02 | Stop reason: HOSPADM

## 2018-03-01 RX ORDER — OXYCODONE HYDROCHLORIDE 5 MG/1
5 TABLET ORAL
Status: DISCONTINUED | OUTPATIENT
Start: 2018-03-01 | End: 2018-03-02 | Stop reason: HOSPADM

## 2018-03-01 RX ORDER — LORAZEPAM 1 MG/1
0.5 TABLET ORAL
Status: DISCONTINUED | OUTPATIENT
Start: 2018-03-01 | End: 2018-03-02 | Stop reason: HOSPADM

## 2018-03-01 RX ORDER — DIPHENOXYLATE HYDROCHLORIDE AND ATROPINE SULFATE 2.5; .025 MG/1; MG/1
1 TABLET ORAL
Status: DISCONTINUED | OUTPATIENT
Start: 2018-03-01 | End: 2018-03-02 | Stop reason: HOSPADM

## 2018-03-01 RX ORDER — LORAZEPAM 2 MG/ML
1 INJECTION INTRAMUSCULAR
Status: DISCONTINUED | OUTPATIENT
Start: 2018-03-01 | End: 2018-03-02 | Stop reason: HOSPADM

## 2018-03-01 RX ORDER — OXYCODONE HYDROCHLORIDE 5 MG/1
10 TABLET ORAL
Status: DISCONTINUED | OUTPATIENT
Start: 2018-03-01 | End: 2018-03-02 | Stop reason: HOSPADM

## 2018-03-01 RX ORDER — ZOLPIDEM TARTRATE 5 MG/1
5 TABLET ORAL
Status: DISCONTINUED | OUTPATIENT
Start: 2018-03-01 | End: 2018-03-02 | Stop reason: HOSPADM

## 2018-03-01 RX ORDER — DIPHENHYDRAMINE HYDROCHLORIDE 50 MG/ML
12.5 INJECTION, SOLUTION INTRAMUSCULAR; INTRAVENOUS
Status: DISCONTINUED | OUTPATIENT
Start: 2018-03-01 | End: 2018-03-02 | Stop reason: HOSPADM

## 2018-03-01 RX ORDER — IBUPROFEN 800 MG/1
800 TABLET ORAL
Status: DISCONTINUED | OUTPATIENT
Start: 2018-03-01 | End: 2018-03-02 | Stop reason: HOSPADM

## 2018-03-01 RX ORDER — SIMETHICONE 80 MG
80 TABLET,CHEWABLE ORAL
Status: DISCONTINUED | OUTPATIENT
Start: 2018-03-01 | End: 2018-03-02 | Stop reason: HOSPADM

## 2018-03-01 RX ORDER — DOCUSATE SODIUM 100 MG/1
100 CAPSULE, LIQUID FILLED ORAL 2 TIMES DAILY
Status: DISCONTINUED | OUTPATIENT
Start: 2018-03-01 | End: 2018-03-02 | Stop reason: HOSPADM

## 2018-03-01 RX ORDER — ONDANSETRON 2 MG/ML
4 INJECTION INTRAMUSCULAR; INTRAVENOUS
Status: DISCONTINUED | OUTPATIENT
Start: 2018-03-01 | End: 2018-03-02 | Stop reason: HOSPADM

## 2018-03-01 RX ORDER — SODIUM CHLORIDE, SODIUM LACTATE, POTASSIUM CHLORIDE, CALCIUM CHLORIDE 600; 310; 30; 20 MG/100ML; MG/100ML; MG/100ML; MG/100ML
125 INJECTION, SOLUTION INTRAVENOUS CONTINUOUS
Status: DISCONTINUED | OUTPATIENT
Start: 2018-03-01 | End: 2018-03-02 | Stop reason: HOSPADM

## 2018-03-01 RX ORDER — ACETAMINOPHEN 10 MG/ML
1000 INJECTION, SOLUTION INTRAVENOUS EVERY 6 HOURS
Status: ACTIVE | OUTPATIENT
Start: 2018-03-01 | End: 2018-03-01

## 2018-03-01 RX ADMIN — SIMETHICONE CHEW TAB 80 MG 80 MG: 80 TABLET ORAL at 15:10

## 2018-03-01 RX ADMIN — KETOROLAC TROMETHAMINE 30 MG: 30 INJECTION, SOLUTION INTRAMUSCULAR at 01:16

## 2018-03-01 RX ADMIN — IBUPROFEN 800 MG: 800 TABLET, FILM COATED ORAL at 15:10

## 2018-03-01 RX ADMIN — DOCUSATE SODIUM 100 MG: 100 CAPSULE, LIQUID FILLED ORAL at 09:31

## 2018-03-01 RX ADMIN — IBUPROFEN 800 MG: 800 TABLET, FILM COATED ORAL at 21:10

## 2018-03-01 RX ADMIN — SIMETHICONE CHEW TAB 80 MG 80 MG: 80 TABLET ORAL at 17:49

## 2018-03-01 RX ADMIN — DOCUSATE SODIUM 100 MG: 100 CAPSULE, LIQUID FILLED ORAL at 17:49

## 2018-03-01 RX ADMIN — KETOROLAC TROMETHAMINE 30 MG: 30 INJECTION, SOLUTION INTRAMUSCULAR at 09:31

## 2018-03-01 RX ADMIN — SIMETHICONE CHEW TAB 80 MG 80 MG: 80 TABLET ORAL at 21:10

## 2018-03-01 NOTE — PROGRESS NOTES
CM met with Patient who had FOB and other family members at her bedside. CM asked patient for permission to speak with her while others were present, and she consented. CM confirmed with MOB that she has a hx of depression and anxiety, and is currently taking Zoloft. MOB stated that she does still take Zoloft and plans to continue. MOB also advised that she receives her medication (Zoloft) from her OB Kitty Qureshi MD). MOB confirmed that she has a crib and car seat for baby, and a support system. Chart reviewed - no needs identified.  met with family and provided education/pamphlet on [facility] Postpartum  Home Visit. Family would like to receive home visit. Referral will be made at discharge. NERY Vargas confirmed face sheet details with MOB.  provided education and literature on support available thru [employer] ([employer]). [employer] Warmline:  3-690-438-4PPD (4364). WWW. POSTPARTUM. NET    Family was informed of signs/symptoms, forms of intervention (medication, counseling, education), and resources (local coordinators available telephonically, monthly support group in [place], weekly \"chat with expert\" phone sessions). Additionally, patient was provided with [facility] Checklist.\"      Discussed importance of self-care and accepting help when offered. Family was encouraged to contact me with any questions/needs -  contact information provided.

## 2018-03-01 NOTE — PROGRESS NOTES
LEFT MESSAGE FOR  PATIENT TO LET HER KNOW SHE WOULD NEED TO CONTACT HER PCP OR OBTAIN A REFERRAL TO A PAIN CLINIC FOR HER PAIN MEDICATION   Progress Note                               Patient: Shazia Moses MRN: [id number]  SSN: [id number]    YOB: 1985  Age: 33 y.o. Sex: female      1 Day Post-Op     Subjective:     Patient doing well postpartum without significant complaints. Voiding without difficulty. Patient reports normal lochia. . Breastfeeding: Yes     Objective:     Patient Vitals for the past 18 hrs:   Temp Pulse Resp BP SpO2   18 1101 98.1 °F (36.7 °C) 68 18 104/65 98 %   18 0707 98.1 °F (36.7 °C) 70 18 100/61 98 %   18 0500 98.6 °F (37 °C) 75 18 107/63 -   18 0100 98.7 °F (37.1 °C) 71 18 116/56 -   18 2100 99.3 °F (37.4 °C) 66 18 111/59 -       Temp (24hrs), Av.4 °F (36.9 °C), Min:97.9 °F (36.6 °C), Max:99.3 °F (37.4 °C)      Physical Exam:    General:   Patient without distress. Abdomen: Soft, fundus firm at level of umbilicus, nontender   Incision: Clean, dry, and intact without erythema   Lower Extremities: Negative for swelling, cords, or tenderness        Lab/Data Review:  CBC:    Recent Labs      18   0640  18   0802   WBC   --   7.1   HGB  11.5*  13.5   HCT  33.7*  39.6   PLT   --   172     Blood Type:   Lab Results   Component Value Date/Time    ABO/Rh(D) AB POSITIVE 2018 08:02 AM    ABO,Rh AB Positive 2017        Assessment and Plan:     Patient appears to be having an uncomplicated post- course. Continue routine postoperative care and maternal education.     Signed By: Isaiah Moeller MD     2018

## 2018-03-01 NOTE — PROGRESS NOTES
SCDs and yee catheter removed. Up to bathroom with assistance. Yris care taught. Understanding demonstrated. Returned to bed. No assistance needed. Ice water provided. Encouraged to drink lots of water and to call out if she thinks she needs to void. Tolerated well.

## 2018-03-01 NOTE — PROGRESS NOTES
IV converted to saline lock. Flushed without difficulty. Urine output more than adequate. Drinking water.

## 2018-03-01 NOTE — LACTATION NOTE
In to see mom and baby for lactation visit. First time mom reports baby has not really latched since birth. Just sits at breast and may lick nipple but does not really attempt to suck. On oral assessment, baby does keep tongue back in mouth and clamps down. Did a few sucks on gloved finger with non-nutritive sucking. Assisted with attempt on right in football and cross-cradle. No latch but baby did suck a few times. Mom reports this was the most effort baby has shown. Assisted mom to start pumping and she pumps 2 ml colostrum fed to baby on finger and in straight syringe. Discussed plan of non-nutritive sucking on finger and tongue walking before attempts at breast.  Can also pump for a few minutes to pull out nipples. Will plan to attempt at breast at each feeding, if no latch needs to pump and give all milk to baby on finger or in syringe. Can just pump and syringe feed at a few feedings if baby is upset etc.  If baby latches well and nurses at least 15 minutes, does not have to pump. Mom comfortable with this plan. May want demo of Spectra pump tomorrow. Discussed possible rental of hospital grade pump if baby continues to not latch and mom is pumping at every feeding. Has appointment scheduled with outpatient lactation consultant Gilberto Escoto) on 3/4/18. Encouraged to call out with any needs. Lactation to follow tomorrow.

## 2018-03-01 NOTE — PROGRESS NOTES
Anesthesiology  Post-op Note    Post-op day 1 s/p  via spinal with neuraxial opioids for post-op pain management. Visit Vitals    /63 (BP 1 Location: Left arm, BP Patient Position: At rest)    Pulse 75    Temp 37 °C (98.6 °F)    Resp 18    LMP 2017    SpO2 98%    Breastfeeding Unknown     Airway patent, patient appropriately hydrated and appears euvolemic. Patient is Alert and oriented. Pain is well controlled. Pruritus is well controlled. Nausea is well controlled. No complaints about back or site of injection. Motor and sensory function has returned to baseline in lower extremities. Patient is satisfied with anesthetic and reports no complications. Continue current orders, then initiate surgeon's orders for pain management 24 hours after . Follow up per surgeon.

## 2018-03-02 VITALS
TEMPERATURE: 98.4 F | HEART RATE: 71 BPM | RESPIRATION RATE: 16 BRPM | DIASTOLIC BLOOD PRESSURE: 77 MMHG | OXYGEN SATURATION: 98 % | SYSTOLIC BLOOD PRESSURE: 116 MMHG

## 2018-03-02 PROCEDURE — 74011250637 HC RX REV CODE- 250/637: Performed by: OBSTETRICS & GYNECOLOGY

## 2018-03-02 RX ORDER — OXYCODONE HYDROCHLORIDE 5 MG/1
5 TABLET ORAL
Qty: 30 TAB | Refills: 0 | Status: SHIPPED | OUTPATIENT
Start: 2018-03-02 | End: 2018-04-11

## 2018-03-02 RX ORDER — ONDANSETRON 8 MG/1
8 TABLET, ORALLY DISINTEGRATING ORAL
Qty: 30 TAB | Refills: 1 | Status: SHIPPED | OUTPATIENT
Start: 2018-03-02 | End: 2018-03-07

## 2018-03-02 RX ORDER — CEPHALEXIN 500 MG/1
500 CAPSULE ORAL 4 TIMES DAILY
Qty: 28 CAP | Refills: 0 | Status: SHIPPED | OUTPATIENT
Start: 2018-03-02 | End: 2018-03-09

## 2018-03-02 RX ORDER — IBUPROFEN 800 MG/1
800 TABLET ORAL
Qty: 60 TAB | Refills: 2 | Status: SHIPPED | OUTPATIENT
Start: 2018-03-02 | End: 2018-04-11

## 2018-03-02 RX ORDER — PROMETHAZINE HYDROCHLORIDE 12.5 MG/1
25 TABLET ORAL
Qty: 30 TAB | Refills: 0 | Status: SHIPPED | OUTPATIENT
Start: 2018-03-02 | End: 2018-03-09

## 2018-03-02 RX ADMIN — IBUPROFEN 800 MG: 800 TABLET, FILM COATED ORAL at 05:18

## 2018-03-02 RX ADMIN — IBUPROFEN 800 MG: 800 TABLET, FILM COATED ORAL at 11:51

## 2018-03-02 NOTE — LACTATION NOTE
Individualized Feeding Plan for Breastfeeding   Lactation Services [phone]  As much as possible, hold your baby on your chest so babys bare skin is against your bare skin with a blanket covering babys back, especially 30 minutes before it is time for baby to eat. Watch for early feeding cues such as, licking lips, sucking motions, rooting, hands to mouth. Crying is a late feeding cue. Feed your baby at least 8 times in 24 hours, or more if your baby is showing feeding cues. If baby is sleepy put baby skin to skin and watch for hunger cues. To rouse baby: unwrap, undress, massage hands, feet, & back, change diaper, gently change babys position from lying to sitting. 15-20 minutes on the first breast of active breastfeeding is considered a good feeding. Good, active breastfeeding is when baby is alert, tugging the nipple, their ear may move, and you can hear swallows. Allow baby to finish the first side before changing sides. Sleeping at the breast or only brief, light sucks should not be considered a good, full breastfeed. At each feeding:  __x__1. Do Suck Practice on finger before each feeding until sucking pattern is smooth. Try using index finger. Nail down towards tongue. __x__2. Hand Express for a few minutes prior to latching to help start milk flow. __x__3. Baby needs to NURSE WELL x 15-20 minutes on at least first breast, burp and offer 2nd breast at every feeding. If no sustained latch only attempt at breast for 10 minutes. If baby does not latch on and feed well on at least one side, you should:   __x__4. Double pump for 15 minutes with breast massage and compression. Hand express for an additional 2-3 minutes per side. Pump after each feeding attempt or poor feeding, up to 8 times per day. If you are not putting baby to the breast you need to pump 8 times a day. Pump every at least every 3 hours. __x__5.  Give baby all of the breast milk you obtain using a straight syringe or  curved syringe. If baby does NOT have enough wet and dirty diapers per day, is jaundiced/lethargic, or has significant weight loss AND you do NOT pump enough milk for each feeding (per volume listed below), formula supplementation may need to be used. Call lactation department /pediatrician if you have concerns. AVERAGE INTAKES OF COLOSTRUM BY HEALTHY  INFANTS:  Time  Day Intake (ml/feed)  Based on 8 feedings per day. 24-48 hrs  2 5-15 ml  48-72 hrs  3 15-30 ml (0.5-1 oz)  Based on every 3 hour feeds  72-96 hrs  4 30-45 ml (1-1.5oz)                          5-6      45-60 ml (1.5-2oz)                           7         60-75 ml (2-2.5oz)    By day 7, baby will need 64 ml or 2 oz at each feeding based on 8 feedings per day & babys weight. (1oz = 30ml). Total milk volume needed in 24 hours by Day 7 is 17oz per day based on baby's birthweight of 6 lbs 6 oz. Use feeding plan until follow up with pediatrician. Continue to attempt at the breast for most feeds. Pump every 3 hours if no latch. Give all pumped colostrum/breastmilk at each feeding. Mom and infant are following up with [facility] and will see lactation consultant there. Outpatient services are located on the 4th floor at [facility]. Check in at the 4th floor registration desk (the same one you used when you came to have your baby). Call for questions [phone]     Breastfeeding Support Group: Meets most months in [address] in [address]. Days and times may vary. Please call [phone] or visit our website [email or web address] for the most current information. Support Group is free, but please register that you plan to attend.

## 2018-03-02 NOTE — LACTATION NOTE
In earlier today to see mom and infant. Mom stated that she had just attempted to latch infant and had fed her what she had pumped previously. She was getting ready to pump. Stated that she had her personal breast pump here and wanted to know if I would demonstrate how to use it. She also asked if I recommended that she rent a hospital grade pump. I suggested to mom that she use her personal breast pump and make her decision based on that. I assisted mom with assembling her pump and starting it. She pumped for 20 minutes and expressed 6 ml. She stated that she had decided to use her pump at home. Did inform her that she could call and request to rent a hospital grade pump if she changes her mind. Reinforced to her to take her breast pump kit home with her for future use. Came back now to assist mom with feeding infant with the curve tip syringe for the first time but mom was finishing up feeding infant and was very excited as to how well infant had sucked on her finger and had taken her expressed colostrum so well. Encouraged mom to offer breast first, then pump and feed infant expressed colostrum/milk. Feeding plan given to mom and reviewed with her and dad. We also discussed introduction of the nippleshield when mom's milk supply is up to 15-20ml from each breast and explained why. Mom also asked about pacifier and we discussed reason to delay introduction for up to four weeks. Mom has a private lactation consultant making a home visit to her on Sunday and she is also following up with [facility] and will see lactation consultant there.

## 2018-03-02 NOTE — PROGRESS NOTES
Post-Operative Day Number 2 Progress Note    Patient doing well post-op day 2 from  delivery without significant complaints. Pain controlled on current medication. Voiding without difficulty, normal lochia. Has passed flatus and has had a BM. Desires discharge. Vitals:  Patient Vitals for the past 8 hrs:   BP Temp Pulse Resp   18 0713 116/77 98.4 °F (36.9 °C) 71 16     Temp (24hrs), Av.3 °F (36.8 °C), Min:98.1 °F (36.7 °C), Max:98.4 °F (36.9 °C)      Vital signs stable, afebrile. Exam:  Patient without distress. Abdomen soft, fundus firm at level of umbilicus, appropriately tender post op. Incision dry and clean with some erythema superior to incision that seems to coincide with the tape from  bandage. Inferior to the incision there is a similar appearance. Lower extremities are negative for swelling, cords or tenderness. Lab/Data Review: All lab results for the last 24 hours reviewed. Assessment and Plan:  Patient appears to be having uncomplicated post- course. Continue routine post-op care and maternal education. Will plan discharge per pt's request since she is doing well. Discussed the mild erythema around her incision, contact reaction from adhesive vs superficial cellulitis discussed. Not warm, no indurated, but given appearance, low threshold to start antibiotic - discussed with patient and after rounds, RN called regarding pt's concern and thus keflex sent to pharmacy. Pt may come in to have this checked in the office in 1-2 wks if desired. Otherwise will see in 6 wks.

## 2018-03-02 NOTE — PROGRESS NOTES
Shift assessment completed, see flowsheet. Discussed plan of care with pt, verbalized understanding. Encouraged to call out for breastfeeding assistance and any needs.

## 2018-03-02 NOTE — LACTATION NOTE
Called Imelda Harris who will follow up with mom and infant on Sunday and gave her the needed information involving breastfeeding and feeding plan that is in place until she sees mom on Sunday.

## 2018-03-02 NOTE — PROGRESS NOTES
Called Dr. Rodriguez Memory regarding patient concern with redness around incision, MD aware of redness, patient is afebrile, states she spoke with patient this AM on rounds, states will call in keflex and states to instruct patient to take probiotic

## 2018-03-02 NOTE — DISCHARGE INSTRUCTIONS
After Your Delivery (the Postpartum Period): Care Instructions  Your Care Instructions    Congratulations on the birth of your baby. Like pregnancy, the  period can be a time of excitement, john, and exhaustion. You may look at your wondrous little baby and feel happy. You may also be overwhelmed by your new sleep hours and new responsibilities. At first, babies often sleep during the days and are awake at night. They do not have a pattern or routine. They may make sudden gasps, jerk themselves awake, or look like they have crossed eyes. These are all normal, and they may even make you smile. In these first weeks after delivery, try to take good care of yourself. It may take 4 to 6 weeks to feel like yourself again, and possibly longer if you had a  birth. You will likely feel very tired for several weeks. Your days will be full of ups and downs, but lots of john as well. Follow-up care is a key part of your treatment and safety. Be sure to make and go to all appointments, and call your doctor if you are having problems. It's also a good idea to know your test results and keep a list of the medicines you take. How can you care for yourself at home? Take care of your body after delivery  · Use pads instead of tampons for the bloody flow that may last as long as 2 weeks. · Ease cramps with ibuprofen (Advil, Motrin). · Ease soreness of hemorrhoids and the area between your vagina and rectum with ice compresses or witch hazel pads. · Ease constipation by drinking lots of fluid and eating high-fiber foods. Ask your doctor about over-the-counter stool softeners. · Cleanse yourself with a gentle squeeze of warm water from a bottle instead of wiping with toilet paper. · Take a sitz bath in warm water several times a day. · Wear a good nursing bra. Ease sore and swollen breasts with warm, wet washcloths. · If you are not breastfeeding, use ice rather than heat for breast soreness.   · Your period may not start for several months if you are breastfeeding. You may bleed more, and longer at first, than you did before you got pregnant. · Wait until you are healed (about 4 to 6 weeks) before you have sexual intercourse. Your doctor will tell you when it is okay to have sex. · Try not to travel with your baby for 5 or 6 weeks. If you take a long car trip, make frequent stops to walk around and stretch. Avoid exhaustion  · Rest every day. Try to nap when your baby naps. · Ask another adult to be with you for a few days after delivery. · Plan for  if you have other children. · Stay flexible so you can eat at odd hours and sleep when you need to. Both you and your baby are making new schedules. · Plan small trips to get out of the house. Change can make you feel less tired. · Ask for help with housework, cooking, and shopping. Remind yourself that your job is to care for your baby. Know about help for postpartum depression  · \"Baby blues\" are common for the first 1 to 2 weeks after birth. You may cry or feel sad or irritable for no reason. · Rest whenever you can. Being tired makes it harder to handle your emotions. · Go for walks with your baby. · Talk to your partner, friends, and family about your feelings. · If your symptoms last for more than a few weeks, or if you feel very depressed, ask your doctor for help. · Postpartum depression can be treated. Support groups and counseling can help. Sometimes medicine can also help. Stay healthy  · Eat healthy foods so you have more energy, make good breast milk, and lose extra baby pounds. · If you breastfeed, avoid alcohol and drugs. Stay smoke-free. If you quit during pregnancy, congratulations. · Start daily exercise after 4 to 6 weeks, but rest when you feel tired. · Learn exercises to tone your belly. Do Kegel exercises to regain strength in your pelvic muscles. You can do these exercises while you stand or sit.   ¨ Squeeze the same muscles you would use to stop your urine. Your belly and thighs should not move. ¨ Hold the squeeze for 3 seconds, and then relax for 3 seconds. ¨ Start with 3 seconds. Then add 1 second each week until you are able to squeeze for 10 seconds. ¨ Repeat the exercise 10 to 15 times for each session. Do three or more sessions each day. · Find a class for new mothers and new babies that has an exercise time. · If you had a  birth, give yourself a bit more time before you exercise, and be careful. When should you call for help? Call 911 anytime you think you may need emergency care. For example, call if:  ? · You passed out (lost consciousness). ?Call your doctor now or seek immediate medical care if:  ? · You have severe vaginal bleeding. This means you are passing blood clots and soaking through a pad each hour for 2 or more hours. ? · You are dizzy or lightheaded, or you feel like you may faint. ? · You have a fever. ? · You have new belly pain, or your pain gets worse. ? Watch closely for changes in your health, and be sure to contact your doctor if:  ? · Your vaginal bleeding seems to be getting heavier. ? · You have new or worse vaginal discharge. ? · You feel sad, anxious, or hopeless for more than a few days. ? · You do not get better as expected. Where can you learn more? Go to http://luis-micheal.info/. Enter A461 in the search box to learn more about \"After Your Delivery (the Postpartum Period): Care Instructions. \"  Current as of: 2017  Content Version: 11.4  © 2038-4066 [employer]. Care instructions adapted under license by [employer] (which disclaims liability or warranty for this information). If you have questions about a medical condition or this instruction, always ask your healthcare professional. [place] [address] any warranty or liability for your use of this information.                 Section: What to Expect at [address] [place] [address]    A  section, or , is surgery to deliver your baby through a cut, called an incision, that the doctor makes in your lower belly and uterus. You may have some pain in your lower belly and need pain medicine for 1 to 2 weeks. You can expect some vaginal bleeding for several weeks. You will probably need about 6 weeks to fully recover. It is important to take it easy while the incision is healing. Avoid heavy lifting, strenuous activities, or exercises that strain the belly muscles while you are recovering. Ask a family member or friend for help with housework, cooking, and shopping. This care sheet gives you a general idea about how long it will take for you to recover. But each person recovers at a different pace. Follow the steps below to get better as quickly as possible. How can you care for yourself at home? Activity  ? · Rest when you feel tired. Getting enough sleep will help you recover. ? · Try to walk each day. Start by walking a little more than you did the day before. Bit by bit, increase the amount you walk. Walking boosts blood flow and helps prevent pneumonia, constipation, and blood clots. ? · Avoid strenuous activities, such as bicycle riding, jogging, weightlifting, and aerobic exercise, for 6 weeks or until your doctor says it is okay. ? · Until your doctor says it is okay, do not lift anything heavier than your baby. ? · Do not do sit-ups or other exercises that strain the belly muscles for 6 weeks or until your doctor says it is okay. ? · Hold a pillow over your incision when you cough or take deep breaths. This will support your belly and decrease your pain. ? · You may shower as usual. Pat the incision dry when you are done. ? · You will have some vaginal bleeding. Wear sanitary pads. Do not douche or use tampons until your doctor says it is okay. ? · Ask your doctor when you can drive again.    ? · You will probably need to take at least 6 weeks off work. It depends on the type of work you do and how you feel. ? · Ask your doctor when it is okay for you to have sex. Diet  ? · You can eat your normal diet. If your stomach is upset, try bland, low-fat foods like plain rice, broiled chicken, toast, and yogurt. ? · Drink plenty of fluids (unless your doctor tells you not to). ? · You may notice that your bowel movements are not regular right after your surgery. This is common. Try to avoid constipation and straining with bowel movements. You may want to take a fiber supplement every day. If you have not had a bowel movement after a couple of days, ask your doctor about taking a mild laxative. ? · If you are breastfeeding, do not drink any alcohol. Medicines  ? · Your doctor will tell you if and when you can restart your medicines. He or she will also give you instructions about taking any new medicines. ? · If you take blood thinners, such as warfarin (Coumadin), clopidogrel (Plavix), or aspirin, be sure to talk to your doctor. He or she will tell you if and when to start taking those medicines again. Make sure that you understand exactly what your doctor wants you to do. ? · Take pain medicines exactly as directed. ¨ If the doctor gave you a prescription medicine for pain, take it as prescribed. ¨ If you are not taking a prescription pain medicine, ask your doctor if you can take an over-the-counter medicine. ? · If you think your pain medicine is making you sick to your stomach:  ¨ Take your medicine after meals (unless your doctor has told you not to). ¨ Ask your doctor for a different pain medicine. ? · If your doctor prescribed antibiotics, take them as directed. Do not stop taking them just because you feel better. You need to take the full course of antibiotics. Incision care  ?  · If you have strips of tape on the incision, leave the tape on for a week or until it falls off.   ? · Wash the area daily with warm, soapy water, and pat it dry. Don't use hydrogen peroxide or alcohol, which can slow healing. You may cover the area with a gauze bandage if it weeps or rubs against clothing. Change the bandage every day. ? · Keep the area clean and dry. Other instructions  ? · If you breastfeed your baby, you may be more comfortable while you are healing if you place the baby so that he or she is not resting on your belly. Try tucking your baby under your arm, with his or her body along the side you will be feeding on. Support your baby's upper body with your arm. With that hand you can control your baby's head to bring his or her mouth to your breast. This is sometimes called the EARTHNET hold. Follow-up care is a key part of your treatment and safety. Be sure to make and go to all appointments, and call your doctor if you are having problems. It's also a good idea to know your test results and keep a list of the medicines you take. When should you call for help? Call 911 anytime you think you may need emergency care. For example, call if:  ? · You passed out (lost consciousness). ? · You have chest pain, are short of breath, or cough up blood. ?Call your doctor now or seek immediate medical care if:  ? · You have pain that does not get better after you take pain medicine. ? · You have severe vaginal bleeding. ? · You are dizzy or lightheaded, or you feel like you may faint. ? · You have new or worse pain in your belly or pelvis. ? · You have loose stitches, or your incision comes open. ? · You have symptoms of infection, such as:  ¨ Increased pain, swelling, warmth, or redness. ¨ Red streaks leading from the incision. ¨ Pus draining from the incision. ¨ A fever. ? · You have symptoms of a blood clot in your leg (called a deep vein thrombosis), such as:  ¨ Pain in your calf, back of the knee, thigh, or groin. ¨ Redness and swelling in your leg or groin. ? Watch closely for changes in your health, and be sure to contact your doctor if:  ? · You do not get better as expected. Where can you learn more? Go to http://luis-micheal.info/. Enter M806 in the search box to learn more about \" Section: What to Expect at Home. \"  Current as of: 2017  Content Version: 11.4  © 5071-8811 [employer]. Care instructions adapted under license by [employer] (which disclaims liability or warranty for this information). If you have questions about a medical condition or this instruction, always ask your healthcare professional. [place] [address] any warranty or liability for your use of this information. DISCHARGE SUMMARY from Nurse    PATIENT INSTRUCTIONS:    After general anesthesia or intravenous sedation, for 24 hours or while taking prescription Narcotics:  · Limit your activities  · Do not drive and operate hazardous machinery  · Do not make important personal or business decisions  · Do  not drink alcoholic beverages  · If you have not urinated within 8 hours after discharge, please contact your surgeon on call. Report the following to your surgeon:  · Excessive pain, swelling, redness or odor of or around the surgical area  · Temperature over 100.5  · Nausea and vomiting lasting longer than 4 hours or if unable to take medications  · Any signs of decreased circulation or nerve impairment to extremity: change in color, persistent  numbness, tingling, coldness or increase pain  · Any questions    What to do at Home:    Discharge instruction to follow: Activity: Pelvis rest for 6 weeks     No heavy lifting over 15 lbs for 2 weeks     No driving for 2 weeks     No push/pull motion such as sweeping or vacuuming for 2 weeks     No tub baths for 6 weeks     section keep incision clean and dry, may shower as normal with soap and water. Inspect incision every day for signs of infection listed below.   Continue to use jovanny-bottle with every void or bowel movement until comfortable stopping. Change sanitary pad after each urination or bowel movement. Call MD for the following:      Fever over 101 F; pain not relieved by medication; foul smelling vaginal discharge or increase in vaginal bleeding. Redness, swelling, or drainage from  incision. Take medication as prescribed. Follow up with MD as order. *  Please give a list of your current medications to your Primary Care Provider. *  Please update this list whenever your medications are discontinued, doses are      changed, or new medications (including over-the-counter products) are added. *  Please carry medication information at all times in case of emergency situations. These are general instructions for a healthy lifestyle:    No smoking/ No tobacco products/ Avoid exposure to second hand smoke  Surgeon General's Warning:  Quitting smoking now greatly reduces serious risk to your health. Obesity, smoking, and sedentary lifestyle greatly increases your risk for illness    A healthy diet, regular physical exercise & weight monitoring are important for maintaining a healthy lifestyle    You may be retaining fluid if you have a history of heart failure or if you experience any of the following symptoms:  Weight gain of 3 pounds or more overnight or 5 pounds in a week, increased swelling in our hands or feet or shortness of breath while lying flat in bed. Please call your doctor as soon as you notice any of these symptoms; do not wait until your next office visit. Recognize signs and symptoms of STROKE:    F-face looks uneven    A-arms unable to move or move unevenly    S-speech slurred or non-existent    T-time-call 911 as soon as signs and symptoms begin-DO NOT go       Back to bed or wait to see if you get better-TIME IS BRAIN. Warning Signs of HEART ATTACK     Call 911 if you have these symptoms:   Chest discomfort.  Most heart attacks involve discomfort in the center of the chest that lasts more than a few minutes, or that goes away and comes back. It can feel like uncomfortable pressure, squeezing, fullness, or pain.  Discomfort in other areas of the upper body. Symptoms can include pain or discomfort in one or both arms, the back, neck, jaw, or stomach.  Shortness of breath with or without chest discomfort.  Other signs may include breaking out in a cold sweat, nausea, or lightheadedness. Don't wait more than five minutes to call 911 - MINUTES MATTER! Fast action can save your life. Calling 911 is almost always the fastest way to get lifesaving treatment. Emergency Medical Services staff can begin treatment when they arrive -- up to an hour sooner than if someone gets to the hospital by car. The discharge information has been reviewed with the patient. The patient verbalized understanding. Discharge medications reviewed with the patient and appropriate educational materials and side effects teaching were provided.   ___________________________________________________________________________________________________________________________________

## 2018-03-06 NOTE — PROGRESS NOTES
Referral made to [facility]  home visit program.    Latisha Chavira, [address] [facility]

## 2019-02-18 PROBLEM — O35.8XX0 SUSPECTED DAMAGE TO FETUS FROM OTHER DISEASE IN MOTHER, AFFECTING MANAGEMENT OF MOTHER, ANTEPARTUM CONDITION OR COMPLICATION: Status: RESOLVED | Noted: 2017-12-04 | Resolved: 2019-02-18

## 2019-02-18 PROBLEM — O34.42 HX LEEP (LOOP ELECTROSURGICAL EXCISION PROCEDURE), CERVIX, PREGNANCY, SECOND TRIMESTER: Status: RESOLVED | Noted: 2017-10-23 | Resolved: 2019-02-18

## 2019-02-18 PROBLEM — Z98.890 HX LEEP (LOOP ELECTROSURGICAL EXCISION PROCEDURE), CERVIX, PREGNANCY, SECOND TRIMESTER: Status: RESOLVED | Noted: 2017-10-23 | Resolved: 2019-02-18

## 2019-02-18 PROBLEM — Z34.90 PREGNANCY: Status: RESOLVED | Noted: 2018-02-22 | Resolved: 2019-02-18

## 2019-02-18 PROBLEM — O99.212 OBESITY AFFECTING PREGNANCY IN SECOND TRIMESTER: Status: RESOLVED | Noted: 2017-09-13 | Resolved: 2019-02-18

## 2019-02-18 PROBLEM — E66.01 OBESITY, MORBID (HCC): Status: ACTIVE | Noted: 2019-02-18

## 2019-02-18 PROBLEM — O09.92 HIGH-RISK PREGNANCY IN SECOND TRIMESTER: Status: RESOLVED | Noted: 2017-09-13 | Resolved: 2019-02-18

## 2019-02-18 PROBLEM — Z3A.39 39 WEEKS GESTATION OF PREGNANCY: Status: RESOLVED | Noted: 2018-02-28 | Resolved: 2019-02-18

## 2019-12-05 PROBLEM — D22.9 ATYPICAL MOLE: Status: ACTIVE | Noted: 2019-12-05

## 2019-12-11 PROBLEM — Z86.018 HISTORY OF DYSPLASTIC NEVUS: Status: ACTIVE | Noted: 2019-08-01

## 2020-07-30 LAB
ANTIBODY SCREEN, EXTERNAL: NORMAL
HBSAG, EXTERNAL: NORMAL
HEPATITIS C AB,   EXT: NORMAL
RPR, EXTERNAL: NORMAL
RUBELLA, EXTERNAL: NORMAL

## 2021-01-05 PROBLEM — Z34.80 PRENATAL CARE OF MULTIGRAVIDA, ANTEPARTUM: Status: ACTIVE | Noted: 2021-01-05

## 2021-01-22 PROBLEM — O34.219 PREVIOUS CESAREAN SECTION COMPLICATING PREGNANCY: Status: ACTIVE | Noted: 2021-01-22

## 2021-01-22 PROBLEM — O98.513 COVID-19 AFFECTING PREGNANCY IN THIRD TRIMESTER: Status: ACTIVE | Noted: 2021-01-22

## 2021-01-22 PROBLEM — O99.213 OBESITY AFFECTING PREGNANCY IN THIRD TRIMESTER: Status: ACTIVE | Noted: 2017-09-13

## 2021-01-22 PROBLEM — U07.1 COVID-19 AFFECTING PREGNANCY IN THIRD TRIMESTER: Status: ACTIVE | Noted: 2021-01-22

## 2021-01-30 PROBLEM — D22.9 ATYPICAL MOLE: Status: RESOLVED | Noted: 2019-12-05 | Resolved: 2021-01-30

## 2021-01-30 PROBLEM — E66.01 MORBID OBESITY (HCC): Status: RESOLVED | Noted: 2019-02-18 | Resolved: 2021-01-30

## 2021-02-02 PROBLEM — O09.43 HIGH RISK MULTIGRAVIDA IN THIRD TRIMESTER: Status: ACTIVE | Noted: 2021-01-05

## 2021-02-09 PROBLEM — O24.410 DIET CONTROLLED GESTATIONAL DIABETES MELLITUS (GDM) IN THIRD TRIMESTER: Status: ACTIVE | Noted: 2021-02-09

## 2021-02-15 LAB — GRBS, EXTERNAL: NEGATIVE

## 2021-03-01 ENCOUNTER — ANESTHESIA EVENT (OUTPATIENT)
Dept: LABOR AND DELIVERY | Age: 36
End: 2021-03-01
Payer: COMMERCIAL

## 2021-03-01 ENCOUNTER — HOSPITAL ENCOUNTER (INPATIENT)
Age: 36
LOS: 4 days | Discharge: HOME | End: 2021-03-03
Attending: OBSTETRICS & GYNECOLOGY | Admitting: OBSTETRICS & GYNECOLOGY
Payer: COMMERCIAL

## 2021-03-01 ENCOUNTER — ANESTHESIA (OUTPATIENT)
Dept: LABOR AND DELIVERY | Age: 36
End: 2021-03-01
Payer: COMMERCIAL

## 2021-03-01 PROBLEM — Z98.891 H/O CESAREAN SECTION: Status: ACTIVE | Noted: 2021-03-01

## 2021-03-01 PROBLEM — Z3A.39 39 WEEKS GESTATION OF PREGNANCY: Status: ACTIVE | Noted: 2021-03-01

## 2021-03-01 LAB
ABO + RH BLD: NORMAL
BLOOD GROUP ANTIBODIES SERPL: NORMAL
ERYTHROCYTE [DISTWIDTH] IN BLOOD BY AUTOMATED COUNT: 14.2 % (ref 11.9–14.6)
GLUCOSE BLD STRIP.AUTO-MCNC: 69 MG/DL (ref 65–100)
GLUCOSE BLD STRIP.AUTO-MCNC: 70 MG/DL (ref 65–100)
HCT VFR BLD AUTO: 34.1 % (ref 35.8–46.3)
HGB BLD-MCNC: 11.9 G/DL (ref 11.7–15.4)
MCH RBC QN AUTO: 30.4 PG (ref 26.1–32.9)
MCHC RBC AUTO-ENTMCNC: 34.9 G/DL (ref 31.4–35)
MCV RBC AUTO: 87.2 FL (ref 79.6–97.8)
NRBC # BLD: 0 K/UL (ref 0–0.2)
PLATELET # BLD AUTO: 183 K/UL (ref 150–450)
PMV BLD AUTO: 11.1 FL (ref 9.4–12.3)
RBC # BLD AUTO: 3.91 M/UL (ref 4.05–5.2)
SPECIMEN EXP DATE BLD: NORMAL
WBC # BLD AUTO: 8.7 K/UL (ref 4.3–11.1)

## 2021-03-01 PROCEDURE — 59510 CESAREAN DELIVERY: CPT | Performed by: OBSTETRICS & GYNECOLOGY

## 2021-03-01 PROCEDURE — 74011250636 HC RX REV CODE- 250/636: Performed by: ANESTHESIOLOGY

## 2021-03-01 PROCEDURE — 85027 COMPLETE CBC AUTOMATED: CPT

## 2021-03-01 PROCEDURE — 74011000250 HC RX REV CODE- 250: Performed by: ANESTHESIOLOGY

## 2021-03-01 PROCEDURE — 65270000029 HC RM PRIVATE

## 2021-03-01 PROCEDURE — 77030032490 HC SLV COMPR SCD KNE COVD -B: Performed by: OBSTETRICS & GYNECOLOGY

## 2021-03-01 PROCEDURE — 4A1HXCZ MONITORING OF PRODUCTS OF CONCEPTION, CARDIAC RATE, EXTERNAL APPROACH: ICD-10-PCS | Performed by: OBSTETRICS & GYNECOLOGY

## 2021-03-01 PROCEDURE — 2709999900 HC NON-CHARGEABLE SUPPLY: Performed by: OBSTETRICS & GYNECOLOGY

## 2021-03-01 PROCEDURE — 76060000078 HC EPIDURAL ANESTHESIA: Performed by: OBSTETRICS & GYNECOLOGY

## 2021-03-01 PROCEDURE — 74011250637 HC RX REV CODE- 250/637: Performed by: ANESTHESIOLOGY

## 2021-03-01 PROCEDURE — 74011250636 HC RX REV CODE- 250/636: Performed by: OBSTETRICS & GYNECOLOGY

## 2021-03-01 PROCEDURE — 75410000003 HC RECOV DEL/VAG/CSECN EA 0.5 HR: Performed by: OBSTETRICS & GYNECOLOGY

## 2021-03-01 PROCEDURE — 76010000391 HC C SECN FIRST 1 HR: Performed by: OBSTETRICS & GYNECOLOGY

## 2021-03-01 PROCEDURE — 77030010507 HC ADH SKN DERMBND J&J -B: Performed by: OBSTETRICS & GYNECOLOGY

## 2021-03-01 PROCEDURE — 76010000392 HC C SECN EA ADDL 0.5 HR: Performed by: OBSTETRICS & GYNECOLOGY

## 2021-03-01 PROCEDURE — 77030002974 HC SUT PLN J&J -A: Performed by: OBSTETRICS & GYNECOLOGY

## 2021-03-01 PROCEDURE — 77030034696 HC CATH URETH FOL 2W BARD -A: Performed by: OBSTETRICS & GYNECOLOGY

## 2021-03-01 PROCEDURE — 74011250636 HC RX REV CODE- 250/636: Performed by: REGISTERED NURSE

## 2021-03-01 PROCEDURE — 77030002933 HC SUT MCRYL J&J -A: Performed by: OBSTETRICS & GYNECOLOGY

## 2021-03-01 PROCEDURE — 36415 COLL VENOUS BLD VENIPUNCTURE: CPT

## 2021-03-01 PROCEDURE — 77030007880 HC KT SPN EPDRL BBMI -B: Performed by: ANESTHESIOLOGY

## 2021-03-01 PROCEDURE — 77030003665 HC NDL SPN BBMI -A: Performed by: ANESTHESIOLOGY

## 2021-03-01 PROCEDURE — 77030039266 HC ADH SKN EXOFIN S2SG -A: Performed by: OBSTETRICS & GYNECOLOGY

## 2021-03-01 PROCEDURE — 74011000250 HC RX REV CODE- 250: Performed by: OBSTETRICS & GYNECOLOGY

## 2021-03-01 PROCEDURE — 74011000250 HC RX REV CODE- 250: Performed by: REGISTERED NURSE

## 2021-03-01 PROCEDURE — 59025 FETAL NON-STRESS TEST: CPT

## 2021-03-01 PROCEDURE — 86901 BLOOD TYPING SEROLOGIC RH(D): CPT

## 2021-03-01 PROCEDURE — 77030002888 HC SUT CHRMC J&J -A: Performed by: OBSTETRICS & GYNECOLOGY

## 2021-03-01 PROCEDURE — 82962 GLUCOSE BLOOD TEST: CPT

## 2021-03-01 RX ORDER — KETOROLAC TROMETHAMINE 30 MG/ML
30 INJECTION, SOLUTION INTRAMUSCULAR; INTRAVENOUS
Status: DISPENSED | OUTPATIENT
Start: 2021-03-01 | End: 2021-03-02

## 2021-03-01 RX ORDER — CEFAZOLIN SODIUM/WATER 2 G/20 ML
2 SYRINGE (ML) INTRAVENOUS ONCE
Status: COMPLETED | OUTPATIENT
Start: 2021-03-01 | End: 2021-03-01

## 2021-03-01 RX ORDER — ONDANSETRON 2 MG/ML
INJECTION INTRAMUSCULAR; INTRAVENOUS AS NEEDED
Status: DISCONTINUED | OUTPATIENT
Start: 2021-03-01 | End: 2021-03-01 | Stop reason: HOSPADM

## 2021-03-01 RX ORDER — ONDANSETRON 2 MG/ML
4 INJECTION INTRAMUSCULAR; INTRAVENOUS ONCE
Status: COMPLETED | OUTPATIENT
Start: 2021-03-01 | End: 2021-03-01

## 2021-03-01 RX ORDER — NALOXONE HYDROCHLORIDE 0.4 MG/ML
0.2 INJECTION, SOLUTION INTRAMUSCULAR; INTRAVENOUS; SUBCUTANEOUS
Status: ACTIVE | OUTPATIENT
Start: 2021-03-01 | End: 2021-03-02

## 2021-03-01 RX ORDER — TRISODIUM CITRATE DIHYDRATE AND CITRIC ACID MONOHYDRATE 500; 334 MG/5ML; MG/5ML
30 SOLUTION ORAL ONCE
Status: COMPLETED | OUTPATIENT
Start: 2021-03-01 | End: 2021-03-01

## 2021-03-01 RX ORDER — OXYTOCIN/RINGER'S LACTATE 30/500 ML
PLASTIC BAG, INJECTION (ML) INTRAVENOUS
Status: DISCONTINUED | OUTPATIENT
Start: 2021-03-01 | End: 2021-03-01 | Stop reason: HOSPADM

## 2021-03-01 RX ORDER — NALBUPHINE HYDROCHLORIDE 10 MG/ML
5 INJECTION, SOLUTION INTRAMUSCULAR; INTRAVENOUS; SUBCUTANEOUS
Status: ACTIVE | OUTPATIENT
Start: 2021-03-01 | End: 2021-03-02

## 2021-03-01 RX ORDER — SODIUM CHLORIDE 0.9 % (FLUSH) 0.9 %
5-40 SYRINGE (ML) INJECTION AS NEEDED
Status: DISCONTINUED | OUTPATIENT
Start: 2021-03-01 | End: 2021-03-03 | Stop reason: HOSPADM

## 2021-03-01 RX ORDER — SODIUM CHLORIDE, SODIUM LACTATE, POTASSIUM CHLORIDE, CALCIUM CHLORIDE 600; 310; 30; 20 MG/100ML; MG/100ML; MG/100ML; MG/100ML
100 INJECTION, SOLUTION INTRAVENOUS CONTINUOUS
Status: DISCONTINUED | OUTPATIENT
Start: 2021-03-01 | End: 2021-03-03 | Stop reason: HOSPADM

## 2021-03-01 RX ORDER — BUPIVACAINE HYDROCHLORIDE 7.5 MG/ML
INJECTION, SOLUTION INTRASPINAL AS NEEDED
Status: DISCONTINUED | OUTPATIENT
Start: 2021-03-01 | End: 2021-03-01 | Stop reason: HOSPADM

## 2021-03-01 RX ORDER — OXYCODONE HYDROCHLORIDE 5 MG/1
10 TABLET ORAL
Status: ACTIVE | OUTPATIENT
Start: 2021-03-01 | End: 2021-03-02

## 2021-03-01 RX ORDER — DEXTROSE, SODIUM CHLORIDE, SODIUM LACTATE, POTASSIUM CHLORIDE, AND CALCIUM CHLORIDE 5; .6; .31; .03; .02 G/100ML; G/100ML; G/100ML; G/100ML; G/100ML
125 INJECTION, SOLUTION INTRAVENOUS CONTINUOUS
Status: DISCONTINUED | OUTPATIENT
Start: 2021-03-01 | End: 2021-03-03 | Stop reason: HOSPADM

## 2021-03-01 RX ORDER — OXYTOCIN/RINGER'S LACTATE 30/500 ML
10 PLASTIC BAG, INJECTION (ML) INTRAVENOUS AS NEEDED
Status: DISCONTINUED | OUTPATIENT
Start: 2021-03-01 | End: 2021-03-03 | Stop reason: HOSPADM

## 2021-03-01 RX ORDER — SODIUM CHLORIDE 0.9 % (FLUSH) 0.9 %
5-40 SYRINGE (ML) INJECTION EVERY 8 HOURS
Status: DISCONTINUED | OUTPATIENT
Start: 2021-03-01 | End: 2021-03-03 | Stop reason: HOSPADM

## 2021-03-01 RX ORDER — KETOROLAC TROMETHAMINE 30 MG/ML
INJECTION, SOLUTION INTRAMUSCULAR; INTRAVENOUS AS NEEDED
Status: DISCONTINUED | OUTPATIENT
Start: 2021-03-01 | End: 2021-03-01 | Stop reason: HOSPADM

## 2021-03-01 RX ORDER — MORPHINE SULFATE 0.5 MG/ML
INJECTION, SOLUTION EPIDURAL; INTRATHECAL; INTRAVENOUS AS NEEDED
Status: DISCONTINUED | OUTPATIENT
Start: 2021-03-01 | End: 2021-03-01 | Stop reason: HOSPADM

## 2021-03-01 RX ORDER — ONDANSETRON 2 MG/ML
4 INJECTION INTRAMUSCULAR; INTRAVENOUS
Status: ACTIVE | OUTPATIENT
Start: 2021-03-01 | End: 2021-03-02

## 2021-03-01 RX ORDER — EPHEDRINE SULFATE/0.9% NACL/PF 50 MG/5 ML
SYRINGE (ML) INTRAVENOUS AS NEEDED
Status: DISCONTINUED | OUTPATIENT
Start: 2021-03-01 | End: 2021-03-01 | Stop reason: HOSPADM

## 2021-03-01 RX ORDER — SERTRALINE HYDROCHLORIDE 50 MG/1
100 TABLET, FILM COATED ORAL DAILY
Status: DISCONTINUED | OUTPATIENT
Start: 2021-03-02 | End: 2021-03-03 | Stop reason: HOSPADM

## 2021-03-01 RX ORDER — ACETAMINOPHEN 325 MG/1
650 TABLET ORAL
Status: DISCONTINUED | OUTPATIENT
Start: 2021-03-01 | End: 2021-03-03 | Stop reason: HOSPADM

## 2021-03-01 RX ORDER — OXYTOCIN/RINGER'S LACTATE 30/500 ML
87.3 PLASTIC BAG, INJECTION (ML) INTRAVENOUS AS NEEDED
Status: DISCONTINUED | OUTPATIENT
Start: 2021-03-01 | End: 2021-03-03 | Stop reason: HOSPADM

## 2021-03-01 RX ORDER — SODIUM CHLORIDE, SODIUM LACTATE, POTASSIUM CHLORIDE, CALCIUM CHLORIDE 600; 310; 30; 20 MG/100ML; MG/100ML; MG/100ML; MG/100ML
1000 INJECTION, SOLUTION INTRAVENOUS CONTINUOUS
Status: DISCONTINUED | OUTPATIENT
Start: 2021-03-01 | End: 2021-03-03 | Stop reason: HOSPADM

## 2021-03-01 RX ORDER — HYDROMORPHONE HYDROCHLORIDE 1 MG/ML
1 INJECTION, SOLUTION INTRAMUSCULAR; INTRAVENOUS; SUBCUTANEOUS
Status: ACTIVE | OUTPATIENT
Start: 2021-03-01 | End: 2021-03-02

## 2021-03-01 RX ORDER — SODIUM CHLORIDE, SODIUM LACTATE, POTASSIUM CHLORIDE, CALCIUM CHLORIDE 600; 310; 30; 20 MG/100ML; MG/100ML; MG/100ML; MG/100ML
INJECTION, SOLUTION INTRAVENOUS
Status: DISCONTINUED | OUTPATIENT
Start: 2021-03-01 | End: 2021-03-01 | Stop reason: HOSPADM

## 2021-03-01 RX ORDER — PRENATAL VIT 96/IRON FUM/FOLIC 27MG-0.8MG
1 TABLET ORAL DAILY
Status: DISCONTINUED | OUTPATIENT
Start: 2021-03-02 | End: 2021-03-03 | Stop reason: HOSPADM

## 2021-03-01 RX ADMIN — SODIUM CHLORIDE, SODIUM LACTATE, POTASSIUM CHLORIDE, AND CALCIUM CHLORIDE: 600; 310; 30; 20 INJECTION, SOLUTION INTRAVENOUS at 13:31

## 2021-03-01 RX ADMIN — PHENYLEPHRINE HYDROCHLORIDE 160 MCG: 10 INJECTION INTRAVENOUS at 13:18

## 2021-03-01 RX ADMIN — ONDANSETRON 4 MG: 2 INJECTION INTRAMUSCULAR; INTRAVENOUS at 13:54

## 2021-03-01 RX ADMIN — SODIUM CHLORIDE, SODIUM LACTATE, POTASSIUM CHLORIDE, AND CALCIUM CHLORIDE: 600; 310; 30; 20 INJECTION, SOLUTION INTRAVENOUS at 13:08

## 2021-03-01 RX ADMIN — SODIUM CITRATE AND CITRIC ACID MONOHYDRATE 30 ML: 500; 334 SOLUTION ORAL at 12:56

## 2021-03-01 RX ADMIN — SODIUM CHLORIDE, SODIUM LACTATE, POTASSIUM CHLORIDE, AND CALCIUM CHLORIDE 500 ML: 600; 310; 30; 20 INJECTION, SOLUTION INTRAVENOUS at 11:40

## 2021-03-01 RX ADMIN — SODIUM CHLORIDE, SODIUM LACTATE, POTASSIUM CHLORIDE, AND CALCIUM CHLORIDE 100 ML/HR: 600; 310; 30; 20 INJECTION, SOLUTION INTRAVENOUS at 15:11

## 2021-03-01 RX ADMIN — PHENYLEPHRINE HYDROCHLORIDE 160 MCG: 10 INJECTION INTRAVENOUS at 13:25

## 2021-03-01 RX ADMIN — Medication 10 MG: at 13:20

## 2021-03-01 RX ADMIN — BUPIVACAINE HYDROCHLORIDE IN DEXTROSE 1.8 ML: 7.5 INJECTION, SOLUTION SUBARACHNOID at 13:16

## 2021-03-01 RX ADMIN — CEFAZOLIN SODIUM 2 G: 100 INJECTION, POWDER, LYOPHILIZED, FOR SOLUTION INTRAVENOUS at 12:57

## 2021-03-01 RX ADMIN — FAMOTIDINE 20 MG: 10 INJECTION INTRAVENOUS at 12:57

## 2021-03-01 RX ADMIN — ONDANSETRON 4 MG: 2 INJECTION INTRAMUSCULAR; INTRAVENOUS at 12:57

## 2021-03-01 RX ADMIN — PHENYLEPHRINE HYDROCHLORIDE 160 MCG: 10 INJECTION INTRAVENOUS at 13:27

## 2021-03-01 RX ADMIN — OXYTOCIN 500 ML/HR: 10 INJECTION, SOLUTION INTRAMUSCULAR; INTRAVENOUS at 13:41

## 2021-03-01 RX ADMIN — MORPHINE SULFATE 200 MCG: 0.5 INJECTION, SOLUTION EPIDURAL; INTRATHECAL; INTRAVENOUS at 13:16

## 2021-03-01 RX ADMIN — KETOROLAC TROMETHAMINE 30 MG: 30 INJECTION, SOLUTION INTRAMUSCULAR at 14:13

## 2021-03-01 RX ADMIN — PHENYLEPHRINE HYDROCHLORIDE 160 MCG: 10 INJECTION INTRAVENOUS at 13:21

## 2021-03-01 RX ADMIN — Medication 10 MG: at 13:22

## 2021-03-01 RX ADMIN — PHENYLEPHRINE HYDROCHLORIDE 160 MCG: 10 INJECTION INTRAVENOUS at 13:16

## 2021-03-01 RX ADMIN — Medication 10 MG: at 13:25

## 2021-03-01 RX ADMIN — PHENYLEPHRINE HYDROCHLORIDE 160 MCG: 10 INJECTION INTRAVENOUS at 13:37

## 2021-03-01 NOTE — PROGRESS NOTES
Safety Teaching reviewed:   1. Hand hygiene prior to handling the infant. 2. Use of bulb syringe  3. Bracelets with matching numbers are placed on mother and infant  3. An infant security tag  [facility]) is placed on the infant's ankle and monitored  5. All OB nurses wear pink Employee badges - do not give your baby to anyone without proper identification. 6. Never leave the baby alone in the room. 7. The infant should be placed on their back to sleep. on a firm mattress. No toys should be placed in the crib. (safe sleep video offered to view)  8. Never shake the baby (video offered to view)  9. Infant fall prevention - do not sleep with the baby, and place the baby in the crib while ambulating. 8. Mother and Baby Care booklet given to Mother.

## 2021-03-01 NOTE — PROGRESS NOTES
1257 Bedside and Verbal shift change report given to JORDAN Medrano RN (oncoming nurse) by [employer]. Praneeth ALLAN (offgoing nurse). Report included the following information SBAR. Pre-op meds given. See MAR.   1304 POC glucose 69. Pt. States she feel fine. 1309 Pt. Transported to OR with RN via wheelchair. See OR record. 1340 Delivery of viable male. See delivery summary. 1427 Pt. Transported to [facility]U via bed with JORDAN Medrano RN and Rex ABDALLA in stable condition.

## 2021-03-01 NOTE — ANESTHESIA PREPROCEDURE EVALUATION
Relevant Problems   NEUROLOGY   (+) Depression affecting pregnancy in third trimester, antepartum      ENDOCRINE   (+) Diet controlled gestational diabetes mellitus (GDM) in third trimester   (+) Obesity affecting pregnancy in third trimester       Anesthetic History   No history of anesthetic complications            Review of Systems / Medical History  Patient summary reviewed and pertinent labs reviewed    Pulmonary  Within defined limits                 Neuro/Psych         Psychiatric history     Cardiovascular                  Exercise tolerance: >4 METS     GI/Hepatic/Renal     GERD: well controlled      PUD     Endo/Other        Morbid obesity  Pertinent negatives: Diabetes: elevated BS; diet control.    Other Findings   Comments: Depression  anxious           Physical Exam    Airway  Mallampati: I  TM Distance: 4 - 6 cm  Neck ROM: normal range of motion   Mouth opening: Normal     Cardiovascular    Rhythm: regular  Rate: normal         Dental  No notable dental hx       Pulmonary  Breath sounds clear to auscultation               Abdominal  Abdominal exam normal       Other Findings            Anesthetic Plan    ASA: 2  Anesthesia type: spinal      Post-op pain plan if not by surgeon: intrathecal opiates      Anesthetic plan and risks discussed with: Patient

## 2021-03-01 NOTE — L&D DELIVERY NOTE
Delivery Summary    Patient: Alize Shaffer MRN: [id number]  SSN: [id number]    YOB: 1985  Age: 30 y.o. Sex: female        Information for the patient's :  Joby Kirby [[id number]]       Labor Events:    Labor: No    Steroids: None   Cervical Ripening Date/Time:       Cervical Ripening Type: None   Antibiotics During Labor: No   Rupture Identifier:      Rupture Date/Time: 3/1/2021 1:39 PM   Rupture Type: AROM   Amniotic Fluid Volume: Moderate    Amniotic Fluid Description: Clear    Amniotic Fluid Odor: None    Induction: None       Induction Date/Time:        Indications for Induction:      Augmentation: None   Augmentation Date/Time:      Indications for Augmentation:     Labor complications: None       Additional complications:        Delivery Events:  Indications For Episiotomy:     Episiotomy: None   Perineal Laceration(s): None   Repaired:     Periurethral Laceration Location:      Repaired:     Labial Laceration Location:     Repaired:     Sulcal Laceration Location:     Repaired:     Vaginal Laceration Location:     Repaired:     Cervical Laceration Location:     Repaired:     Repair Suture: None   Number of Repair Packets:     Estimated Blood Loss (ml):  ml   Quantitaive Blood Loss (ml):             Delivery Date: 3/1/2021    Delivery Time: 1:40 PM   Delivery Type: , Low Transverse     Details    Trial of Labor: No   Primary/Repeat: Repeat   Priority: Routine   Indications:  Prior Uterine Surgery       Sex:  Male     Gestational Age: 44w2d  Delivery Clinician:  Carlos Newberry  Living Status: Living   Delivery Location: OR            APGARS  One minute Five minutes Ten minutes   Skin color: 0   1        Heart rate: 2   2        Grimace: 2   2        Muscle tone: 2   2        Breathin   2        Totals: 8   9          Presentation: Vertex    Position:        Resuscitation Method:  Suctioning-bulb; Tactile Stimulation     Meconium Stained: None      Cord Information: 3 Vessels  Complications: None  Cord around:    Delayed cord clamping? No  Cord clamped date/time:3/1/2021  1:41 PM  Disposition of Cord Blood: Lab    Blood Gases Sent?: No    Placenta:  Date/Time: 3/1/2021  1:41 PM  Removal: Expressed      Appearance: Normal;Intact     [place] Measurements:  Birth Weight: 8 lb 5.7 oz (3.79 kg)      Birth Length: 53 cm      Head Circumference: 37 cm      Chest Circumference: 34 cm     Abdominal Girth: Other Providers:   Brandee URIOSTEGUI;NARDA GALAN;GABY WATT;LISA ALCANTAR;JOSE ALBERTO HOOVER;DANIEL MURRELL;GRETA ARNOLD;SAMRA CHAVEZ;PHU RIVERA, Obstetrician;Primary Nurse;Primary  Nurse;Neonatologist;Anesthesiologist;Crna;Scrub Tech;Scrub Tech;Respiratory Therapist             Group B Strep:   Lab Results   Component Value Date/Time    GrBStrep, External NEGATIVE 02/15/2021     Information for the patient's :  Melissa Goodman [[id number]]   No results found for: ABORH, PCTABR, PCTDIG, BILI, ABORHEXT, ABORH     No results for input(s): PCO2CB, PO2CB, HCO3I, SO2I, IBD, PTEMPI, SPECTI, PHICB, ISITE, IDEV, IALLEN in the last 72 hours.

## 2021-03-01 NOTE — PROGRESS NOTES
Pt presented for scheduled Repeat  section. POC reviewed. IV started, Consents witnessed. Lab work drawn, sent to lab.

## 2021-03-01 NOTE — ANESTHESIA POSTPROCEDURE EVALUATION
Procedure(s):   SECTION.     spinal    Anesthesia Post Evaluation      Multimodal analgesia: multimodal analgesia used between 6 hours prior to anesthesia start to PACU discharge  Patient location during evaluation: bedside  Patient participation: complete - patient participated  Level of consciousness: awake and awake and alert  Pain score: 0  Pain management: adequate  Airway patency: patent  Anesthetic complications: no  Cardiovascular status: acceptable and stable  Respiratory status: acceptable and nasal cannula  Hydration status: acceptable  Comments: Spinal receeding  Post anesthesia nausea and vomiting:  none  Final Post Anesthesia Temperature Assessment:  Normothermia (36.0-37.5 degrees C)      INITIAL Post-op Vital signs:   Vitals Value Taken Time   BP 97/52 21 1526   Temp 36.6 °C (97.9 °F) 21 1427   Pulse 70 21 1526   Resp 18 21 1526   SpO2 99 % 21 1526

## 2021-03-01 NOTE — ANESTHESIA PROCEDURE NOTES
Spinal Block    Start time: 3/1/2021 1:13 PM  End time: 3/1/2021 1:16 PM  Performed by: Sera Cristina MD  Authorized by: Sera Cristina MD     Pre-procedure:   Indications: at surgeon's request and primary anesthetic  Preanesthetic Checklist: patient identified, risks and benefits discussed, anesthesia consent, site marked, patient being monitored and timeout performed    Timeout Time: 13:13          Spinal Block:   Patient Position:  Seated  Prep Region:  Lumbar  Prep: chlorhexidine      Location:  L3-4  Technique:  Single shot        Needle:   Needle Type:  Pencan  Needle Gauge:  25 G  Attempts:  1      Events: CSF confirmed, no blood with aspiration and no paresthesia        Assessment:  Insertion:  Uncomplicated  Patient tolerance:  Patient tolerated the procedure well with no immediate complications

## 2021-03-01 NOTE — H&P
History & Physical    Name: Tigre Valencia MRN: [id number]  SSN: [id number]    YOB: 1985  Age: 30 y.o. Sex: female      Subjective:     Estimated Date of Delivery: 3/6/21  OB History    Para Term  AB Living   2 1 1 0 0 1   SAB TAB Ectopic Molar Multiple Live Births   0 0 0 0 0 1      # Outcome Date GA Lbr Eugene/2nd Weight Sex Delivery Anes PTL Lv   2 Current            1 Term 18 39w0d  6 lb 15.5 oz (3.16 kg) F CS-LTranv SPINAL AN N CRUZ       Ms. [facility] admitted with pregnancy at 39w2d for  section due to previous  section. Prenatal course was complicated by diabetes - gestational. Please see prenatal records for details.     Past Medical History:   Diagnosis Date    Abnormal Papanicolaou smear of cervix     Anxiety     Atypical mole 2019    Atypical mole 2019    Depression     Elevated prolactin level     Gastric ulcer     GERD (gastroesophageal reflux disease)     H/O seasonal allergies     History of dysplastic nevus 2019    from mid/upper back path report dysplastic completely excised - see scanned documents     Infertility, female     anovulation, male factor    LGSIL (low grade squamous intraepithelial dysplasia)     Morbid obesity ([address]     Morbid obesity ([address] 2019    Varicose vein of leg     RLE     Past Surgical History:   Procedure Laterality Date    HX  SECTION  2018    HX DILATION AND CURETTAGE  16    Hysteroscopy/polypectomy    HX GYN       IUI's    HX HYSTEROSCOPY      uterine polyps    HX LEEP PROCEDURE      HX PELVIC LAPAROSCOPY  16    Laser     Social History     Occupational History    Occupation: works in [email or web address] Use    Smoking status: Former Smoker    Smokeless tobacco: Never Used    Tobacco comment: former some day smoker for about 2 yrs in her 19's   Substance and Sexual Activity    Alcohol use: Not Currently     Alcohol/week: 1.7 standard drinks     Types: 2 Glasses of wine per week     Comment: none in pregnancy    Drug use: Never    Sexual activity: Yes     Partners: Male     Birth control/protection: None     Family History   Problem Relation Age of Onset    Other Mother         ulcerative Colitis    No Known Problems Father     Arthritis-rheumatoid Brother     Celiac Disease Sister        No Known Allergies  Prior to Admission medications    Medication Sig Start Date End Date Taking? Authorizing Provider   sertraline (ZOLOFT) 100 mg tablet TAKE 1 TABLET BY MOUTH DAILY. INDICATIONS: ANXIETY WITH DEPRESSION 10/22/20  Yes Sonya Goins MD   PNV SF.66/KRFBAYC fum/folic ac (PRENATAL PO) Take  by mouth. Yes Provider, Historical   Blood-Glucose Meter monitoring kit Dispense 1; No refills 21   Andreea Nicolas MD   glucose blood VI test strips (blood glucose test) strip Test up to 5 times per day 21   Andreea Nicolas MD   lancets misc Test up to 5 times per day 21   Andreea Nicolas MD        Review of Systems: A comprehensive review of systems was negative except for that written in the History of Present Illness. Objective:     Vitals:  Vitals:    21 1157   BP: (!) 117/59   Pulse: 78   Resp: 18   Temp: 98.1 °F (36.7 °C)        Physical Exam:  Patient without distress. Heart: Regular rate and rhythm  Lung: clear to auscultation throughout lung fields, no wheezes, no rales, no rhonchi and normal respiratory effort  Membranes:  Intact  Fetal Heart Rate: Reactive    Prenatal Labs:   Lab Results   Component Value Date/Time    ABO/Rh(D) AB POSITIVE 2018 08:02 AM    Rubella, External IMM 2020    GrBStrep, External NEGATIVE 02/15/2021    HBsAg, External NEG 2020    HIV, External Non reactive 2017    RPR, External NR 2020    Gonorrhea, External Negative 2017    Chlamydia, External Negative 2017    ABO,Rh AB Positive 2017         Impression/Plan:     Plan:  Admit for  section.  Group B Strep was negative. Discussed the risks of surgery including the risks of bleeding, infection, deep vein thrombosis, and surgical injuries to internal organs including but not limited to the bowels, bladder, rectum, and female reproductive organs. The patient understands the risks; any and all questions were answered to the patient's satisfaction.

## 2021-03-01 NOTE — OP NOTES
Section Delivery Operative Report       Patient: Evert Lui MRN: [id number]  SSN: [id number]    YOB: 1985  Age: 30 y.o. Sex: female       Date of Procedure: 3/1/2021     Preoperative Diagnosis: jerald 3/6/2021 Repeat Cesrean Section    Postoperative Diagnosis: Delivery of viable boy. See delivery summary    Procedure: Low Cervical Transverse Procedure(s):   SECTION    Surgeon(s):  Pantera Newberry MD    Anesthesia: Spinal    Prophylactic Antibiotics: Ancef    Estimated Blood Loss:  500 ml     Information for the patient's :  [place] Reach [[id number]]   Delivery of a 8 lb 5.7 oz (3.79 kg) male infant on 3/1/2021 at 1:40 PM. Apgars were 8  and 9 . Umbilical Cord: 3 Vessels     Umbilical Cord Events: None     Placenta: Expressed removal with Normal;Intact appearance. Amniotic Fluid Volume: Moderate     Amniotic Fluid Description:  Clear         Specimens:   ID Type Source Tests Collected by Time Destination   1 : Placenta Placenta Placenta  Yissel Cazares MD 3/1/2021 1345 Discarded               Complications:  none    Procedure Details: The patient was taken to the operating room, where Spinal anesthesia was administered and found to be adequate. Urinary catheter had been placed using sterile technique. The patient was prepped and draped in the normal sterile fashion. Time out was performed confirming the patient, procedure and any pertinent concerns. The abdomen was entered using the Pfannenstiel technique. The peritoneum was entered sharply well superior to the bladder. The bladder blade was then inserted. The vesicouterine and peritoneum was identified and entered sharply with Metzenbaum scissors. The bladder flap was then created sharply and the bladder blade was reinserted. A low transverse uterine incision was made with the scalpel and extended laterally with blunt finger dissection. Amniotomy was performed.  The babys head was then delivered atraumatically. The nose and mouth were suctioned. The remainder of the delivery was carried out in the usual fashion without difficulty. The cord was clamped and cut and the baby was handed off to the waiting  care unit staff. Placenta was then expressed from the uterus. The uterus was exteriorized and cleared of all clots and debris. The uterine incision was closed with number 1 Chromic in running locking fashion with good hemostasis assured. The posterior cul-de-sac was irrigated with warm normal saline. Good hemostasis was again reassured and the uterus was returned to the abdomen. The anterior pelvis was irrigated with warm normal saline and good hemostasis was again reassured throughout. Peritoneum closed with running chromic. The rectus muscles were reapproximated in the midline with a series of simple stitches with 0 chromic. The fascia was closed with 0 PDS in a running fashion. Good hemostasis was assured. The subcuticular layers were reapproximated with 2-0 plain gut in running fashion. The skin was closed with a 4-0 Monocryl in a subcuticular fashion. Skin adhesive was applied. The patient tolerated the procedure well. Sponge, lap, and needle counts were correct times three and the patient and baby were taken to recovery/postpartum room in stable condition.     Signed By: Annie Castaneda MD     2021

## 2021-03-02 LAB
HCT VFR BLD AUTO: 31.7 % (ref 35.8–46.3)
HGB BLD-MCNC: 10.9 G/DL (ref 11.7–15.4)

## 2021-03-02 PROCEDURE — 74011250636 HC RX REV CODE- 250/636: Performed by: OBSTETRICS & GYNECOLOGY

## 2021-03-02 PROCEDURE — 65270000029 HC RM PRIVATE

## 2021-03-02 PROCEDURE — 36415 COLL VENOUS BLD VENIPUNCTURE: CPT

## 2021-03-02 PROCEDURE — 85018 HEMOGLOBIN: CPT

## 2021-03-02 PROCEDURE — 74011250636 HC RX REV CODE- 250/636: Performed by: ANESTHESIOLOGY

## 2021-03-02 PROCEDURE — 74011250637 HC RX REV CODE- 250/637: Performed by: ANESTHESIOLOGY

## 2021-03-02 PROCEDURE — 74011250637 HC RX REV CODE- 250/637: Performed by: OBSTETRICS & GYNECOLOGY

## 2021-03-02 RX ORDER — DIPHENHYDRAMINE HCL 25 MG
25 CAPSULE ORAL
Status: DISCONTINUED | OUTPATIENT
Start: 2021-03-02 | End: 2021-03-03 | Stop reason: HOSPADM

## 2021-03-02 RX ORDER — SIMETHICONE 80 MG
80 TABLET,CHEWABLE ORAL
Status: DISCONTINUED | OUTPATIENT
Start: 2021-03-02 | End: 2021-03-03 | Stop reason: HOSPADM

## 2021-03-02 RX ORDER — OXYTOCIN/RINGER'S LACTATE 30/500 ML
500 PLASTIC BAG, INJECTION (ML) INTRAVENOUS CONTINUOUS
Status: DISCONTINUED | OUTPATIENT
Start: 2021-03-02 | End: 2021-03-03 | Stop reason: HOSPADM

## 2021-03-02 RX ORDER — SODIUM CHLORIDE 0.9 % (FLUSH) 0.9 %
5-40 SYRINGE (ML) INJECTION AS NEEDED
Status: DISCONTINUED | OUTPATIENT
Start: 2021-03-02 | End: 2021-03-03 | Stop reason: HOSPADM

## 2021-03-02 RX ORDER — DOCUSATE SODIUM 100 MG/1
100 CAPSULE, LIQUID FILLED ORAL 2 TIMES DAILY
Status: DISCONTINUED | OUTPATIENT
Start: 2021-03-02 | End: 2021-03-03 | Stop reason: HOSPADM

## 2021-03-02 RX ORDER — DEXTROSE, SODIUM CHLORIDE, SODIUM LACTATE, POTASSIUM CHLORIDE, AND CALCIUM CHLORIDE 5; .6; .31; .03; .02 G/100ML; G/100ML; G/100ML; G/100ML; G/100ML
125 INJECTION, SOLUTION INTRAVENOUS CONTINUOUS
Status: DISCONTINUED | OUTPATIENT
Start: 2021-03-02 | End: 2021-03-03 | Stop reason: HOSPADM

## 2021-03-02 RX ORDER — SODIUM CHLORIDE 0.9 % (FLUSH) 0.9 %
5-40 SYRINGE (ML) INJECTION EVERY 8 HOURS
Status: DISCONTINUED | OUTPATIENT
Start: 2021-03-02 | End: 2021-03-03 | Stop reason: HOSPADM

## 2021-03-02 RX ORDER — OXYCODONE AND ACETAMINOPHEN 10; 325 MG/1; MG/1
1 TABLET ORAL
Status: DISCONTINUED | OUTPATIENT
Start: 2021-03-02 | End: 2021-03-03 | Stop reason: HOSPADM

## 2021-03-02 RX ORDER — IBUPROFEN 800 MG/1
800 TABLET ORAL
Status: DISCONTINUED | OUTPATIENT
Start: 2021-03-02 | End: 2021-03-03 | Stop reason: HOSPADM

## 2021-03-02 RX ORDER — NALOXONE HYDROCHLORIDE 0.4 MG/ML
0.4 INJECTION, SOLUTION INTRAMUSCULAR; INTRAVENOUS; SUBCUTANEOUS AS NEEDED
Status: DISCONTINUED | OUTPATIENT
Start: 2021-03-02 | End: 2021-03-03 | Stop reason: HOSPADM

## 2021-03-02 RX ORDER — ONDANSETRON 4 MG/1
4 TABLET, ORALLY DISINTEGRATING ORAL
Status: DISCONTINUED | OUTPATIENT
Start: 2021-03-02 | End: 2021-03-03 | Stop reason: HOSPADM

## 2021-03-02 RX ORDER — OXYCODONE AND ACETAMINOPHEN 7.5; 325 MG/1; MG/1
1 TABLET ORAL
Status: DISCONTINUED | OUTPATIENT
Start: 2021-03-02 | End: 2021-03-03 | Stop reason: HOSPADM

## 2021-03-02 RX ORDER — ENOXAPARIN SODIUM 100 MG/ML
40 INJECTION SUBCUTANEOUS EVERY 12 HOURS
Status: DISCONTINUED | OUTPATIENT
Start: 2021-03-02 | End: 2021-03-03 | Stop reason: HOSPADM

## 2021-03-02 RX ADMIN — IBUPROFEN 800 MG: 800 TABLET ORAL at 22:06

## 2021-03-02 RX ADMIN — ACETAMINOPHEN 650 MG: 325 TABLET, FILM COATED ORAL at 05:41

## 2021-03-02 RX ADMIN — ENOXAPARIN SODIUM 40 MG: 40 INJECTION SUBCUTANEOUS at 12:34

## 2021-03-02 RX ADMIN — KETOROLAC TROMETHAMINE 30 MG: 30 INJECTION, SOLUTION INTRAMUSCULAR at 09:13

## 2021-03-02 RX ADMIN — ACETAMINOPHEN 650 MG: 325 TABLET, FILM COATED ORAL at 12:40

## 2021-03-02 RX ADMIN — IBUPROFEN 800 MG: 800 TABLET ORAL at 16:12

## 2021-03-02 RX ADMIN — ACETAMINOPHEN 650 MG: 325 TABLET, FILM COATED ORAL at 01:29

## 2021-03-02 RX ADMIN — KETOROLAC TROMETHAMINE 30 MG: 30 INJECTION, SOLUTION INTRAMUSCULAR at 01:29

## 2021-03-02 RX ADMIN — DOCUSATE SODIUM 100 MG: 100 CAPSULE ORAL at 16:11

## 2021-03-02 RX ADMIN — SIMETHICONE 80 MG: 80 TABLET, CHEWABLE ORAL at 16:11

## 2021-03-02 RX ADMIN — ACETAMINOPHEN 650 MG: 325 TABLET, FILM COATED ORAL at 20:39

## 2021-03-02 NOTE — PROGRESS NOTES
Progress Note                               Patient: Antonella Shah MRN: [id number]  SSN: [id number]    YOB: 1985  Age: 30 y.o. Sex: female      1 Day Post-Op     Subjective:     Patient doing well postpartum without significant complaints. Voiding without difficulty. Pain controlled on current medications. Lochia is appropriate, ambulating, passing flatus. Denies n/v, tolerating regular diet. No leg, thigh, or calf pain. No SOB/CP       Objective:     Patient Vitals for the past 12 hrs:   Temp Pulse Resp BP SpO2   21 1000 98.6 °F (37 °C) 79 16 (!) 99/58 96 %   21 0321 97.8 °F (36.6 °C) 77 17 126/61 98 %   21 2350 98.5 °F (36.9 °C) 76 18 103/60 98 %       Temp (24hrs), Av.1 °F (36.7 °C), Min:97.7 °F (36.5 °C), Max:98.6 °F (37 °C)      Date 21 07 - 21 0659 21 07 - 21 0659   Shift 7483-5416 0282-2952 24 Hour Total 3025-6599 1828-0496 24 Hour Total   INTAKE   I.V. 2500  2500        Pitocin Volume 500  500        Volume (lactated Ringers infusion) 1000  1000        Volume (lactated Ringers infusion) 1000  1000      Shift Total(mL/kg) 2500  2500      OUTPUT   Urine 400 1600 2000        Urine Output 200  200        Urine Output (mL) ([REMOVED] Urinary Catheter 21 Holloway) 200 1600 1800      Blood 500  500        Quantitative Blood Loss (mL) 500  500      Shift Total(mL/kg) 900 1600 2500      NET 1600 -1600 0      Weight (kg)    109.8 109.8 109.8         Physical Exam:    Constitutional: She appears well-developed and well-nourished.  No distress.    HENT:    Head: Normocephalic and atraumatic.    Cardiovascular: RRR  Pulmonary/Chest: CTAB  Abd: S/appropriately TTP/ND, BS hypoactive, fundus firm at umbilicus, Incision c/d/i, no erythema/induration  Ext: No c/c/e; +severe varicose veins in R leg extending to thigh (stable per pt ), NTTP      Lab/Data Review:  CBC:    Recent Labs     21  0558 21  1151   WBC  --  8.7   HGB 10.9* 11.9 HCT 31.7* 34.1*   PLT  --  183     BMP/CMP:  No results for input(s): NA, K, CL, CO2, AGAP, GLU, BUN, CREA, GFRAA, GFRNA, CA, MG, PHOS, ALB, TBIL, CBIL, TP, AP, GLOB, AGRAT, ALT in the last 72 hours. No lab exists for component: SGOT, GPT  GBS:   Lab Results   Component Value Date/Time    GrBStrep, External NEGATIVE 02/15/2021     Blood Type:   Lab Results   Component Value Date/Time    ABO/Rh(D) AB POSITIVE 2021 11:51 AM    ABO,Rh AB Positive 2017        Assessment and Plan:     30 y.o.  POD# 1 from repeat c/s:    1) Postop:  Meeting all goals, continue routine care.      2) Rh pos, Rub imm, breast feeding    3) A1DM  4) Anx/Dep: Zoloft 100mg; stable   5) Morbid Obesity:  BMI 45.7; will add Lovenox 40mg SC BID while inpt for ppx       Signed By: Brittani Mosley MD     2021

## 2021-03-02 NOTE — LACTATION NOTE
This note was copied from a baby's chart. Mom brought in some frozen colostrum from [facility].  Used a fair amount last night, but thinks she has about 10 ml left. Pumped 3 ml about 1 hour ago. Mom reports baby has not been latching and pumped and bottle fed with first.  First baby never latched and so far neither has this baby. First had a non-addressed TT. Baby has some markers for tethered oral tissue. High palate, tongue back, recessed chin. Tongue does have some mobility and baby is less than 24 hours old. He has been spitty, which can impact suck this early. Encouraged mom to should follow function. Nipples are everted, short, but turtle in. Mom is trying at breast and pumping with her Spectra. Spectra is working well for her, so did not transition her to the Symphony. It is available if she does want to use it. Assisted with attempt at breast in cross cradle and football on L. No latch. Discussed normal  behavior. May take baby a little while to figure out how to nurse well. Plan to continued trying at breast and pumping if no latch. Will follow output and weight loss. Will continue to assist with positioning and technique. Encouraged attempt at breast and follow plan.

## 2021-03-02 NOTE — PROGRESS NOTES
Shift assessment complete. Questions encouraged and answered. Pt denies pain. Encouraged to call out prn.

## 2021-03-02 NOTE — PROGRESS NOTES
Nutrition:  Best Practice Alert received for GDM. Diet: DIET NPO    Patient is admitted for c section. Will defer assessment as per standard of care.   Jaja Thibodeaux, [address] [place] [address], [address] [place] [address]

## 2021-03-02 NOTE — LACTATION NOTE

## 2021-03-02 NOTE — PROGRESS NOTES
Patient is POD 1 s/p  and received neuraxial morphine for post-op pain control. Visit Vitals  /61 (BP 1 Location: Right arm, BP Patient Position: At rest)   Pulse 77   Temp 36.6 °C (97.8 °F)   Resp 17   LMP 2020   SpO2 98%   Breastfeeding Yes   , airway patent, patient appropriately hydrated and appears euvolemic. She reports minimal incisional pain. Patient reports minimal pruritis and no nausea. Her lower extremities have returned to baseline neurologically. She was satisfied with the anesthetic and reports no complications. Continue current orders.

## 2021-03-02 NOTE — PROGRESS NOTES
Chart reviewed - depression/anxiety. SW met with patient/ while social distancing w/appropriate PPE. Patient confirms experiencing both depression and anxiety after the birth of her daughter in 2018. She was already on Zoloft at this time and \"uped my dose,\" which she found beneficial.  Patient remains on Zoloft (100mg) at the time and reports that it is managing her depression well. Patient plans to stay on the Zoloft postpartum. Patient given informational packet on  mood & anxiety disorders (resources/education). Patient does not have a PCP; referral made to [facility] PCP Coordinator. Family denies any additional needs from  at this time. Family has 's contact information should any needs/questions arise.     CRUZ Schilling-JENELLE  [address] [facility]   [phone]

## 2021-03-03 VITALS
DIASTOLIC BLOOD PRESSURE: 71 MMHG | RESPIRATION RATE: 20 BRPM | SYSTOLIC BLOOD PRESSURE: 114 MMHG | OXYGEN SATURATION: 97 % | HEART RATE: 73 BPM | HEIGHT: 61 IN | BODY MASS INDEX: 45.69 KG/M2 | TEMPERATURE: 98 F | WEIGHT: 242 LBS

## 2021-03-03 PROCEDURE — 74011250636 HC RX REV CODE- 250/636: Performed by: OBSTETRICS & GYNECOLOGY

## 2021-03-03 PROCEDURE — 74011250637 HC RX REV CODE- 250/637: Performed by: OBSTETRICS & GYNECOLOGY

## 2021-03-03 PROCEDURE — 90715 TDAP VACCINE 7 YRS/> IM: CPT | Performed by: OBSTETRICS & GYNECOLOGY

## 2021-03-03 PROCEDURE — 74011250637 HC RX REV CODE- 250/637: Performed by: ANESTHESIOLOGY

## 2021-03-03 RX ORDER — HYDROCHLOROTHIAZIDE 50 MG/1
50 TABLET ORAL DAILY
Qty: 5 TAB | Refills: 0 | Status: SHIPPED | OUTPATIENT
Start: 2021-03-03 | End: 2021-03-15 | Stop reason: ALTCHOICE

## 2021-03-03 RX ORDER — IBUPROFEN 800 MG/1
800 TABLET ORAL
Qty: 60 TAB | Refills: 0 | Status: SHIPPED | OUTPATIENT
Start: 2021-03-03 | End: 2021-05-11 | Stop reason: ALTCHOICE

## 2021-03-03 RX ADMIN — TETANUS TOXOID, REDUCED DIPHTHERIA TOXOID AND ACELLULAR PERTUSSIS VACCINE, ADSORBED 0.5 ML: 5; 2.5; 8; 8; 2.5 SUSPENSION INTRAMUSCULAR at 12:57

## 2021-03-03 RX ADMIN — SERTRALINE 100 MG: 50 TABLET, FILM COATED ORAL at 09:16

## 2021-03-03 RX ADMIN — PRENATAL VIT W/ FE FUMARATE-FA TAB 27-0.8 MG 1 TABLET: 27-0.8 TAB at 09:15

## 2021-03-03 RX ADMIN — IBUPROFEN 800 MG: 800 TABLET ORAL at 10:02

## 2021-03-03 RX ADMIN — DOCUSATE SODIUM 100 MG: 100 CAPSULE ORAL at 09:16

## 2021-03-03 RX ADMIN — SIMETHICONE 80 MG: 80 TABLET, CHEWABLE ORAL at 09:16

## 2021-03-03 RX ADMIN — IBUPROFEN 800 MG: 800 TABLET ORAL at 04:08

## 2021-03-03 RX ADMIN — ACETAMINOPHEN 650 MG: 325 TABLET, FILM COATED ORAL at 02:18

## 2021-03-03 RX ADMIN — ENOXAPARIN SODIUM 40 MG: 40 INJECTION SUBCUTANEOUS at 12:00

## 2021-03-03 RX ADMIN — ENOXAPARIN SODIUM 40 MG: 40 INJECTION SUBCUTANEOUS at 00:23

## 2021-03-03 NOTE — PROGRESS NOTES
Lovenox given see MAR. Pt states she is aware of how to give SQ injections from when she had to do IVF medications.

## 2021-03-03 NOTE — DISCHARGE SUMMARY
Obstetrical Discharge Summary     Name: Ravin Correia MRN: [id number]  SSN: [id number]    YOB: 1985  Age: 30 y.o. Sex: female      Allergies: Patient has no known allergies. Admit Date: 3/1/2021    Discharge Date: 3/3/2021     Admitting Physician: Ginette Mendoza MD     Attending Physician:  Arron Ibarra MD     * Admission Diagnoses: 39 weeks gestation of pregnancy [Z3A.39];H/O  section [Z98.891]    * Discharge Diagnoses:   Information for the patient's :  Kristal David [[id number]]   Delivery of a 8 lb 5.7 oz (3.79 kg) male infant via , Low Transverse on 3/1/2021 at 1:40 PM  by Ginette Mendoza. Apgars were 8  and 9 . Additional Diagnoses:   Hospital Problems as of 3/3/2021 Date Reviewed: 3/1/2021          Codes Class Noted - Resolved POA    H/O  section ICD-10-CM: Z98.891  ICD-9-CM: V45.89  3/1/2021 - Present Unknown        39 weeks gestation of pregnancy ICD-10-CM: Z3A.39  ICD-9-CM: V22.2  3/1/2021 - Present Unknown        Diet controlled gestational diabetes mellitus (GDM) in third trimester ICD-10-CM: O24.410  ICD-9-CM: 648.83  2021 - Present Yes    Overview Signed 2021  4:45 PM by Alex Toledo RN     2021 at [facility]: Log reviewed, several elevations, late onset GDM. · Reduce carbs. · Continue diet control and increase activity. Add medication if elevations become consistent. · Future pregnancies, suggest QID testing at 28 weeks versus GTT               * (Principal) Previous  section complicating pregnancy MTG-18-HR: O34.219  ICD-9-CM: 654.20  2021 - Present Yes        High risk multigravida in third trimester ICD-10-CM: O09.43  ICD-9-CM: V23.89  2021 - Present Yes    Overview Addendum 2021  4:43 PM by Alex Toledo RN       1)bmi 43. Passed glu at 28wks  2)previous c/s for breech.  Interested in [facility]  3)prenatal labs and hgbA1c normal    2021 at [facility]: Accelerated fetal growth AC 74%, Overall 75%, RISSA 16cm, UA Dopplers WNL, BPP /. Discussed in length regarding diet and reducing carbs and possibility of late onset of GDM. Pt would like to QID test. Testing supplies sent to pharmacy on file. Advised pt to eat diet as normal.    · No follow-up with MFM, refer back PRN  · Growth with primary OB in 10-14 days  · Weekly testing with primary OB  · Primary OB to determine method of delivery               Depression affecting pregnancy in third trimester, antepartum ICD-10-CM: O99.343, F32.9  ICD-9-CM: 648.43, 311  Unknown - Present Yes    Overview Addendum 2021  4:26 PM by Sumi Mcgill RN     2021 at [facility]: Just increased to Zoloft 100 mg. Mood is stable. 2021 at [facility]: Mood stable. Lab Results   Component Value Date/Time    ABO/Rh(D) AB POSITIVE 2021 11:51 AM    Rubella, External IMM 2020    GrBStrep, External NEGATIVE 02/15/2021    ABO,Rh AB Positive 2017      Immunization History   Administered Date(s) Administered    Influenza Vaccine 10/08/2014, 10/02/2018, 11/15/2019    Tdap 2008       * Procedures:   Procedure(s) with comments:   SECTION - jerald 3/6/2021 Repeat Cesrean Section           * Discharge Condition: good    * Hospital Course: Normal hospital course following the delivery. * Disposition: Home    Discharge Medications:   Current Discharge Medication List      START taking these medications    Details   ibuprofen (MOTRIN) 800 mg tablet Take 1 Tab by mouth every eight (8) hours as needed for Pain. Qty: 60 Tab, Refills: 0      hydroCHLOROthiazide (HYDRODIURIL) 50 mg tablet Take 1 Tab by mouth daily. Qty: 5 Tab, Refills: 0         CONTINUE these medications which have NOT CHANGED    Details   sertraline (ZOLOFT) 100 mg tablet TAKE 1 TABLET BY MOUTH DAILY. INDICATIONS: ANXIETY WITH DEPRESSION  Qty: 90 Tab, Refills: 3             * Follow-up Care/Patient Instructions:   Activity: No sex for 6 weeks, No driving while on analgesics and No heavy lifting for 6 weeks  Diet: Regular Diet  Wound Care: As directed    Follow-up Information     Follow up With Specialties Details Why Contact Info    None    None ([phone]) Patient stated that they have no PCP

## 2021-03-03 NOTE — PROGRESS NOTES
Bedside report received from Clarence Nelson RN. Patient care assumed. Star Wedge Flap Text: The defect edges were debeveled with a #15 scalpel blade.  Given the location of the defect, shape of the defect and the proximity to free margins a star wedge flap was deemed most appropriate.  Using a sterile surgical marker, an appropriate rotation flap was drawn incorporating the defect and placing the expected incisions within the relaxed skin tension lines where possible. The area thus outlined was incised deep to adipose tissue with a #15 scalpel blade.  The skin margins were undermined to an appropriate distance in all directions utilizing iris scissors.

## 2021-03-03 NOTE — PROGRESS NOTES
Patient discharged to home per Dr. Ranjeet Russell orders. Discharge instructions reviewed with patient and pt given a copy. Pt aware that she needs to take LDA for 6wk per Dr. Ranjeet Russell. Questions encouraged and answered. Patient verbalizes understanding. Patient escorted by [facility] staff to private vehicle via w/c. Stable at discharge.

## 2021-03-03 NOTE — PROGRESS NOTES
Shift assessment complete see flowsheet. Discussed today plan of care with pt. Bleeding precautions given. Pt declines wanting pain medication at this time. Pt to call with needs/concerns. Pt in bed with call light in reach. No s/s of distress noted.

## 2021-03-03 NOTE — PROGRESS NOTES
Post-Operative Day Number 2 Progress Note  Eliceo Padilla  [id number]    Patient doing well post-op day 2 from  delivery without significant complaints. Pain controlled on current medication. Voiding without difficulty, normal lochia. Tolerating regular diet. Wants to go home. Has LE edema. Had covid the beg of . Has gotten prophylactic lovenox inpt. No hx dvt/pe. Vitals:    Patient Vitals for the past 8 hrs:   BP Temp Pulse Resp SpO2   21 0650 114/71 98 °F (36.7 °C) 73 20 97 %     Temp (24hrs), Av.1 °F (36.7 °C), Min:97.9 °F (36.6 °C), Max:98.4 °F (36.9 °C)    No Is and Os recorded for past 12+hrs  Vital signs stable, afebrile. Exam:  Patient without distress. Abdomen soft, fundus firm at level of umbilicus, nontender. Incision dry and                      clean without erythema. 2+pitting edema R shin (the leg with varicosities), 1+ L shin              Labs: No results found for this or any previous visit (from the past 24 hour(s)). Assessment and Plan:  Patient appears to be having uncomplicated post- course. Continue routine post-op care and maternal education. Will send home. I  rec LDA 162mg daily for 6wks. Pt says she needs no narcotics. Will also give HCTZ for 5d.

## 2021-03-03 NOTE — LACTATION NOTE
This note was copied from a baby's chart. In to see mom and infant for discharge. Mom feeding baby back her pumped breast milk by curve tip syringe. Baby tolerated well. Gave mom printed feeding plan for guidance at home. She verbalized understanding how to use. Mom has her own personal pump to use. Reviewed discharge info and how to manage period of engorgement. Mom plans to follow up at [facility], may be possible candidate for nipple shield if baby continues to have hard time latching. No further needs at this time.

## 2021-03-03 NOTE — DISCHARGE INSTRUCTIONS
Section: What to Expect at Home    Your Recovery:  A  section, or , is surgery to deliver your baby through a cut, called an incision that the doctor makes in your lower belly and uterus. You may have some pain in your lower belly and need pain medicine for 1 to 2 weeks. You can expect some vaginal bleeding for several weeks. You will probably need about 6 weeks to fully recover. It is important to take it easy while the incision is healing. Avoid heavy lifting, strenuous activities, or exercises that strain the belly muscles while you are recovering. Ask a family member or friend for help with housework, cooking, and shopping. This care sheet gives you a general idea about how long it will take for you to recover. But each person recovers at a different pace. Follow the steps below to get better as quickly as possible. How can you care for yourself at home? Activity  · Rest when you feel tired. Getting enough sleep will help you recover. · Try to walk each day. Start by walking a little more than you did the day before. Bit by bit, increase the amount you walk. Walking boosts blood flow and helps prevent pneumonia, constipation, and blood clots. · Avoid strenuous activities, such as bicycle riding, jogging, weightlifting, and aerobic exercise, for 6 weeks or until your doctor says it is okay. · Until your doctor says it is okay, do not lift anything heavier than your baby. · Do not do sit-ups or other exercise that strain the belly muscles for 6 weeks or until your doctor says it is okay. · Hold a pillow over your incision when you cough or take deep breaths. This will support your belly and decrease your pain. · You may shower as usual. Pat the incision dry when you are done. · You will have some vaginal bleeding. Wear sanitary pads. Do not douche or use tampons until your doctor says it is okay. · Ask your doctor when you can drive again.   · You will probably need to take at least 6 weeks off work. It depends on the type of work you do and how you feel. · Wait until you are healed (about 4 to 6 weeks) before you have sexual intercourse. Your doctor will tell you when it is okay to have sex. · Talk to your doctor about birth control. You can get pregnant even before your period returns. Also, you can get pregnant while you are breast-feeding. Pelvic rest for 6wk  NO driving for 2wk or while taking pain medications  NO push/pull motion such as sweeping or vacuuming for 2wk  NO lifting >10lb for 2wk  NO tub baths, pools, or hot tubs for 6wk     Incision care  Your skin is your body's first line of defense against germs, but an incision site leaves an easy way for germs to enter your body. To prevent infection:  · Clean your hands frequently and before and after changing any touching any dressings. · If you have strips of tape on the incision, leave the tape on for a week or until it falls off. · Look at your incision closely every day for any changes. Contact your doctor if you experience any signs of infection, such as fever or increased redness at the surgical site. · Wash the area daily with warm, soapy water, and pat it dry. Don't use hydrogen peroxide or alcohol, which can slow healing. You may cover the area with a gauze bandage if it weeps or rubs against clothing. Change the bandage every day. · Keep the area clean and dry. Diet  · You can eat your normal diet. If your stomach is upset, try bland, low-fat foods like plain rice, broiled chicken, toast, and yogurt. · Drink plenty of fluids (unless your doctor tells you not to). · You may notice that your bowel movements are not regular right after your surgery. This is common. Try to avoid constipation and straining with bowel movements. You may want to take a fiber supplement every day. If you have not had a bowel movement after a couple of days, ask your doctor about taking a mild laxative.   · If you are breast-feeding, do not drink any alcohol. Medicines  · Take pain medicines exactly as directed. · If the doctor gave you a prescription medicine for pain, take it as prescribed. · If you are not taking a prescription pain medicine, ask your doctor if you can take an over-the-counter medicine such as acetaminophen (Tylenol), ibuprofen (Advil, Motrin), or naproxen (Aleve), for cramps. Read and follow all instructions on the label. Do not take aspirin, because it can cause more bleeding. Do not take acetaminophen (Tylenol) and other acetaminophen containing medications (i.e. Percocet) at the same time. · If you think your pain medicine is making you sick to your stomach:  · Take your medicine after meals (unless your doctor has told you not to). · Ask your doctor for a different pain medicine. · If your doctor prescribed antibiotics, take them as directed. Do not stop taking them just because you feel better. You need to take the full course of antibiotics. Mental Health  · Many women get the \"baby blues\" during the first few days after childbirth. You may lose sleep, feel irritable, and cry easily. You may feel happy one minute and sad the next. Hormone changes are one cause of these emotional changes. Also, the demands of a new baby, along with visits from relatives or other family needs, add to a mother's stress. The \"baby blues\" often peak around the fourth day. Then they ease up in less than 2 weeks. · If your moodiness or anxiety lasts for more than 2 weeks, or if you feel like life is not worth living, you may have postpartum depression. This is different for each mother. Some mothers with serious depression may worry intensely about their infant's well-being. Others may feel distant from their child. Some mothers might even feel that they might harm their baby. A mother may have signs of paranoia, wondering if someone is watching her.       · With all the changes in your life, you may not know if you are depressed. Pregnancy sometimes causes changes in how you feel that are similar to the symptoms of depression. · Symptoms of depression include:  · Feeling sad or hopeless and losing interest in daily activities. These are the most common symptoms of depression. · Sleeping too much or not enough. · Feeling tired. You may feel as if you have no energy. · Eating too much or too little. · [employer] ([employer]) offers a Warm line; Chat with the Expert phone sessions; Information and Articles about Pregnancy and Postpartum Mood Disorders; Comprehensive List of Free Support Groups; Knowledgeable local coordinators who will offer support, information, and resources; Guide to Resources on [employer]; Calendar of events in the  mood disorders community; Latest News and Research; and [facility] [address] for [employer]. Remember - You are not alone; You are not to blame; With help, you will be well. 2-459-144-PPD(4773). WWW. [employer]. NET   · Writing or talking about death, such as writing suicide notes or talking about guns, knives, or pills. Keep the numbers for these national suicide hotlines: 7-829-395-TALK (0-386.372.7465) and 1-062-KWTQJSE (1-589.211.5312). If you or someone you know talks about suicide or feeling hopeless, get help right away. Other instructions  · If you breast-feed your baby, you may be more comfortable while you are healing if you place the baby so that he or she is not resting on your belly. Try tucking your baby under your arm, with his or her body along the side you will be feeding on. Support your baby's upper body with your arm. With that hand you can control your baby's head to bring his or her mouth to your breast. This is sometimes called the football hold. Follow-up care is a key part of your treatment and safety. Be sure to make and go to all appointments, and call your doctor if you are having problems.  It's also a good idea to know your test results and keep a list of the medicines you take. When should you call for help? Call 911 anytime you think you may need emergency care. For example, call if:  · You are thinking of hurting yourself, your baby, or anyone else. · You passed out (lost consciousness). · You have symptoms of a blood clot in your lung (called a pulmonary embolism). These may include:  · Sudden chest pain. · Trouble breathing. · Coughing up blood. Call your doctor now or seek immediate medical care if:    · You have severe vaginal bleeding. · You are soaking through a pad each hour for 2 or more hours. · Your vaginal bleeding seems to be getting heavier or is still bright red 4 days after delivery. · You are dizzy or lightheaded, or you feel like you may faint. · You are vomiting or cannot keep fluids down. · You have a fever. · You have new or more belly pain. · You have loose stitches, or your incision comes open. · You have symptoms of infection, such as:  · Increased pain, swelling, warmth, or redness. · Red streaks leading from the incision. · Pus draining from the incision. · A fever  · You pass tissue (not just blood). · Your vaginal discharge smells bad. · Your belly feels tender or full and hard. · Your breasts are continuously painful or red. · You feel sad, anxious, or hopeless for more than a few days. · You have sudden, severe pain in your belly. · You have symptoms of a blood clot in your leg (called a deep vein thrombosis), such as:  · Pain in your calf, back of the knee, thigh, or groin. · Redness and swelling in your leg or groin. · You have symptoms of preeclampsia, such as:  · Sudden swelling of your face, hands, or feet. · New vision problems (such as dimness or blurring). · A severe headache. · Your blood pressure is higher than it should be or rises suddenly. · You have new nausea or vomiting.     Watch closely for changes in your health, and be sure to contact your doctor if you have any problems.

## 2021-03-03 NOTE — PROGRESS NOTES
Report received from Sulema Tate RN care assumed. Pt up to bathroom at this time. No complaints.  Red pull cord within reach.

## 2021-03-03 NOTE — LACTATION NOTE
This note was copied from a baby's chart. Individualized Feeding Plan for Breastfeeding   Lactation Services [phone]      As much as possible, hold your baby on your chest so babys bare skin is against your bare skin with a blanket covering babys back, especially 30 minutes before it is time for baby to eat. Watch for early feeding cues such as, licking lips, sucking motions, rooting, hands to mouth. Crying is a late feeding cue. Feed your baby at least 8 times in 24 hours, or more if your baby is showing feeding cues. If baby is sleepy put baby skin to skin and watch for hunger cues. To rouse baby: unwrap, undress, massage hands, feet, & back, change diaper, gently change babys position from lying to sitting. 15-20 minutes on the first breast of active breastfeeding is considered a good feeding. Good, active breastfeeding is when baby is alert, tugging the nipple, their ear may move, and you can hear swallows. Allow baby to finish the first side before changing sides. Sleeping at the breast or only brief, light sucks should not be considered a good, full breastfeed. At each feedin. Baby needs to NURSE WELL x 15-20 minutes on at least first breast, burp and offer 2nd breast at every feeding. If no sustained latch only attempt at breast for 10 minutes. If baby does not latch on and feed well on at least one side, you should:         2.. Double pump for 15 minutes with breast massage and compression. Hand express for an additional 2-3 minutes per side. Pump after each feeding attempt or poor feeding, up to 8 times per day. If you are not putting baby to the breast you need to pump 8 times a day. Pump every 3 hours. 3. Give baby all of the breast milk you obtain using a straight syringe or  curved syringe.     If baby does NOT have enough wet and dirty diapers per day, is jaundiced/lethargic, or has significant weight loss AND you do NOT pump enough milk for each feeding (per volume listed below), formula supplementation may need to be used. Call lactation department /pediatrician if you have concerns. AVERAGE INTAKES OF COLOSTRUM BY HEALTHY  INFANTS:  Time  Day Intake (ml per feeding)  Based on 8 feedings per day. 1st 24 hrs  1 2-10 ml  24-48 hrs  2 5-15 ml  48-72 hrs  3 15-30 ml (0.5-1 oz)  72-96 hrs  4 30-45 ml (1-1.5oz)                          5-6      45-60 ml (1.5-2oz)                           7         85 ml + as desired    By day 7, baby will need 85 ml or 2.75 oz at each feeding based on 8 feedings per day & babys weight. (1oz = 30ml). Total milk volume needed in 24 hours by Day 7 is 22-23 oz per day based on baby's birthweight of 8lb 6oz. The more often baby eats, the less volume they need per feeding. If baby is eating more often than the minimum of 8 times per day, they may take less per feeding. Comments: If pumping, suggest using olive oil or coconut oil on your nipples before pumping to help reduce the friction. Use feeding plan until follow up with pediatrician. Continue to attempt at the breast for most feeds. Pump every 3 hours if no latch. Give all pumped colostrum/breastmilk at each feeding. OUTPATIENT APPOINTMENT Suggested. Outpatient services are located on the 4th floor at [facility]. Check in at the 4th floor registration desk (the same one you used when you came to have your baby).   Call for questions [phone]

## 2021-03-03 NOTE — PROGRESS NOTES
03/03/21 1002   Pain Assessment   Pain Scale 1 Numeric (0 - 10)   Pain Intensity 1 3   Pain Description 1 Aching;Dull   Pain Intervention(s) 1 Medication (see MAR)   Vital Signs   Level of Consciousness Alert   Motrin administered. Primary RN aware.

## 2022-03-18 PROBLEM — Z98.891 H/O CESAREAN SECTION: Status: ACTIVE | Noted: 2021-03-01

## 2022-03-18 PROBLEM — Z86.018 HISTORY OF DYSPLASTIC NEVUS: Status: ACTIVE | Noted: 2019-08-01

## 2022-03-18 PROBLEM — O09.43 HIGH RISK MULTIGRAVIDA IN THIRD TRIMESTER: Status: ACTIVE | Noted: 2021-01-05

## 2022-03-18 PROBLEM — O99.213 OBESITY AFFECTING PREGNANCY IN THIRD TRIMESTER: Status: ACTIVE | Noted: 2017-09-13

## 2022-03-19 PROBLEM — U07.1 COVID-19 AFFECTING PREGNANCY IN THIRD TRIMESTER: Status: ACTIVE | Noted: 2021-01-22

## 2022-03-19 PROBLEM — O98.513 COVID-19 AFFECTING PREGNANCY IN THIRD TRIMESTER: Status: ACTIVE | Noted: 2021-01-22

## 2022-03-19 PROBLEM — O34.219 PREVIOUS CESAREAN SECTION COMPLICATING PREGNANCY: Status: ACTIVE | Noted: 2021-01-22

## 2022-03-19 PROBLEM — O24.410 DIET CONTROLLED GESTATIONAL DIABETES MELLITUS (GDM) IN THIRD TRIMESTER: Status: ACTIVE | Noted: 2021-02-09

## 2022-03-20 PROBLEM — Z3A.39 39 WEEKS GESTATION OF PREGNANCY: Status: ACTIVE | Noted: 2021-03-01

## 2022-07-26 ENCOUNTER — OFFICE VISIT (OUTPATIENT)
Dept: OCCUPATIONAL MEDICINE | Age: 37
End: 2022-07-26

## 2022-07-26 VITALS
HEIGHT: 62 IN | SYSTOLIC BLOOD PRESSURE: 118 MMHG | DIASTOLIC BLOOD PRESSURE: 78 MMHG | TEMPERATURE: 98 F | BODY MASS INDEX: 43.24 KG/M2 | OXYGEN SATURATION: 98 % | WEIGHT: 235 LBS | HEART RATE: 101 BPM | RESPIRATION RATE: 16 BRPM

## 2022-07-26 DIAGNOSIS — J02.0 STREP PHARYNGITIS: Primary | ICD-10-CM

## 2022-07-26 DIAGNOSIS — J02.9 SORE THROAT: ICD-10-CM

## 2022-07-26 LAB
GROUP A STREP ANTIGEN, POC: POSITIVE
VALID INTERNAL CONTROL, POC: YES

## 2022-07-26 PROCEDURE — 87430 STREP A AG IA: CPT | Performed by: NURSE PRACTITIONER

## 2022-07-26 PROCEDURE — 99214 OFFICE O/P EST MOD 30 MIN: CPT | Performed by: NURSE PRACTITIONER

## 2022-07-26 RX ORDER — AMOXICILLIN 500 MG/1
500 CAPSULE ORAL 2 TIMES DAILY
Qty: 20 CAPSULE | Refills: 0 | Status: SHIPPED | OUTPATIENT
Start: 2022-07-26 | End: 2022-08-05

## 2022-07-26 ASSESSMENT — ENCOUNTER SYMPTOMS
RHINORRHEA: 0
SORE THROAT: 1
SINUS PRESSURE: 0
FACIAL SWELLING: 0
VOICE CHANGE: 0
COUGH: 0
CHEST TIGHTNESS: 0
SHORTNESS OF BREATH: 0
TROUBLE SWALLOWING: 0
SINUS PAIN: 0

## 2022-07-26 NOTE — PROGRESS NOTES
HISTORY OF PRESENT ILLNESS  Mayank Brody is a 40 y.o. female     Presenting today with a sore throat. James Arechiga works in the [address] at [employer]. Started yesterday with new sore throat, rapidly worsening with accompanied aches and chills. Pain is severe described as \"sharp\" aggravated with swallowing. Taking Ibuprofen 400mg every 4hrs, helping some. Hx of COVID-19 1 month ago. Denies any documented fever, headache, congestion, cough, CP, or SOB. Review of Systems   Constitutional:  Positive for chills. Negative for activity change, appetite change, fatigue and fever. HENT:  Positive for sore throat. Negative for congestion, dental problem, ear discharge, ear pain, facial swelling, hearing loss, mouth sores, nosebleeds, postnasal drip, rhinorrhea, sinus pressure, sinus pain, sneezing, tinnitus, trouble swallowing and voice change. Respiratory:  Negative for cough, chest tightness and shortness of breath. Cardiovascular:  Negative for chest pain. Musculoskeletal:  Positive for myalgias. Neurological:  Negative for dizziness and headaches.       Past Medical History:   Diagnosis Date    Abnormal Papanicolaou smear of cervix     Anxiety     Atypical mole 12/5/2019    Atypical mole 12/5/2019    Depression     Elevated prolactin level     Gastric ulcer     GERD (gastroesophageal reflux disease)     H/O seasonal allergies     History of dysplastic nevus 08/2019    from mid/upper back path report dysplastic completely excised - see scanned documents     Infertility, female     anovulation, male factor    LGSIL (low grade squamous intraepithelial dysplasia)     Morbid obesity ([address] 2/18/2019    Morbid obesity ([address]     Varicose vein of leg     RLE     Social History     Socioeconomic History    Marital status:      Spouse name: Not on file    Number of children: Not on file    Years of education: Not on file    Highest education level: Not on file   Occupational History    Not on file   Tobacco Use    Smoking status: Former    Smokeless tobacco: Never    Tobacco comments:     Quit smoking: former some day smoker for about 2 yrs in her 19's   Substance and Sexual Activity    Alcohol use: Not Currently     Alcohol/week: 1.7 standard drinks    Drug use: Never    Sexual activity: Not on file   Other Topics Concern    Not on file   Social History Narrative    Not on file     Social Determinants of Health     Financial Resource Strain: Not on file   Food Insecurity: Not on file   Transportation Needs: Not on file   Physical Activity: Not on file   Stress: Not on file   Social Connections: Not on file   Intimate Partner Violence: Not on file   Housing Stability: Not on file     Family History   Problem Relation Age of Onset    No Known Problems Father     Rheum Arthritis Brother     Other Mother         ulcerative Colitis    Celiac Disease Sister      Past Surgical History:   Procedure Laterality Date     SECTION  2018    DILATION AND CURETTAGE OF UTERUS  17    Hysteroscopy/polypectomy    GYN       IUI's    HYSTEROSCOPY      uterine polyps    LEEP      PELVIC LAPAROSCOPY  16    Laser     Immunization History   Administered Date(s) Administered    Influenza Virus Vaccine 10/08/2014, 10/02/2018, 11/15/2019    Tdap (Boostrix, Adacel) 2008, 2021     Current Outpatient Medications   Medication Sig Dispense Refill    sertraline (ZOLOFT) 100 MG tablet Take 150 mg by mouth daily      lansoprazole (PREVACID) 15 MG delayed release capsule Take 15 mg by mouth daily (Patient not taking: Reported on 2022)       No current facility-administered medications for this visit. No Known Allergies    Physical Exam  Vitals reviewed. Constitutional:       General: She is awake. She is not in acute distress. Appearance: Normal appearance. She is well-developed and well-groomed. She is obese. She is ill-appearing. She is not toxic-appearing. HENT:      Head: Normocephalic and atraumatic. Sitting)   Pulse (!) 101   Temp 98 °F (36.7 °C)   Resp 16   Ht 5' 2\" (1.575 m)   Wt 235 lb (106.6 kg)   SpO2 98%   BMI 42.98 kg/m²     Results for orders placed or performed in visit on 07/26/22   AMB POC RAPID STREP TEST   Result Value Ref Range    Valid Internal Control, POC yes     Group A Strep Antigen, POC Positive Negative       ASSESSMENT and PLAN  Diagnoses and all orders for this visit:    Strep pharyngitis  -     amoxicillin (AMOXIL) 500 MG capsule; Take 1 capsule by mouth in the morning and 1 capsule before bedtime. Do all this for 10 days. Sore throat  -     AMB POC RAPID STREP TEST      Rapid strep test positive. Start Amoxil as described above for 10 days. Patient Education:  Risk vs. Benefits of medications discussed. Discussed need to take entire course of antibiotic. Increase fluid intake. May take Tylenol/Motrin PRN for fever, body aches, and throat pain. Reviewed warm salt water gargles, chloroseptic spray PRN, lozenges, and cool mist humidifier for throat pain. Good hand hygiene. Dispose of and replace toothbrush. Follow up: 3-5 days with PCP or here  if not any better or worse. Seek ED care if severe throat/neck swelling develops, SOB, or CP. Patient voices understanding and agrees with the plan of care described above.

## 2022-07-27 ENCOUNTER — OFFICE VISIT (OUTPATIENT)
Dept: OCCUPATIONAL MEDICINE | Age: 37
End: 2022-07-27

## 2022-07-27 VITALS
OXYGEN SATURATION: 100 % | DIASTOLIC BLOOD PRESSURE: 80 MMHG | SYSTOLIC BLOOD PRESSURE: 138 MMHG | HEART RATE: 88 BPM | RESPIRATION RATE: 16 BRPM | TEMPERATURE: 98.2 F

## 2022-07-27 DIAGNOSIS — J02.0 STREP PHARYNGITIS: Primary | ICD-10-CM

## 2022-07-27 PROCEDURE — 99213 OFFICE O/P EST LOW 20 MIN: CPT | Performed by: NURSE PRACTITIONER

## 2022-07-27 RX ORDER — METHYLPREDNISOLONE 4 MG/1
TABLET ORAL
Qty: 1 KIT | Refills: 0 | Status: SHIPPED | OUTPATIENT
Start: 2022-07-27 | End: 2022-08-02

## 2022-07-27 ASSESSMENT — ENCOUNTER SYMPTOMS
SHORTNESS OF BREATH: 0
VOICE CHANGE: 1
ABDOMINAL PAIN: 0
SINUS PAIN: 0
TROUBLE SWALLOWING: 1
SORE THROAT: 1
NAUSEA: 0
SINUS PRESSURE: 0
COUGH: 0

## 2022-07-27 NOTE — PROGRESS NOTES
HISTORY OF PRESENT ILLNESS  Casey Guo is a 40 y.o. female     Presenting today with a worsening sore throat. I saw Coreen Tucker here yesterday, dx with strep pharyngitis. Started on Amoxil, took a full 1g yesterday and started another dose this morning. Pain has not improved, rather worsening with accompanied swelling. She has increased her Ibuprofen to 800mg every 8hrs, helping some. Admits to some chills and sweats during night. Review of Systems   Constitutional:  Positive for chills and diaphoresis. Negative for fatigue and fever. HENT:  Positive for sore throat, trouble swallowing and voice change. Negative for congestion, sinus pressure, sinus pain and tinnitus. Respiratory:  Negative for cough and shortness of breath. Cardiovascular:  Negative for chest pain. Gastrointestinal:  Negative for abdominal pain and nausea. Musculoskeletal:  Negative for myalgias. Neurological:  Negative for headaches.       Past Medical History:   Diagnosis Date    Abnormal Papanicolaou smear of cervix     Anxiety     Atypical mole 12/5/2019    Atypical mole 12/5/2019    Depression     Elevated prolactin level     Gastric ulcer     GERD (gastroesophageal reflux disease)     H/O seasonal allergies     History of dysplastic nevus 08/2019    from mid/upper back path report dysplastic completely excised - see scanned documents     Infertility, female     anovulation, male factor    LGSIL (low grade squamous intraepithelial dysplasia)     Morbid obesity ([address] 2/18/2019    Morbid obesity ([address]     Varicose vein of leg     RLE     Social History     Socioeconomic History    Marital status:      Spouse name: Not on file    Number of children: Not on file    Years of education: Not on file    Highest education level: Not on file   Occupational History    Not on file   Tobacco Use    Smoking status: Former    Smokeless tobacco: Never    Tobacco comments:     Quit smoking: former some day smoker for diaphoretic. Comments: \"Hot potato\" voice   HENT:      Head: Normocephalic and atraumatic. Jaw: There is normal jaw occlusion. Right Ear: Tympanic membrane, ear canal and external ear normal.      Left Ear: Tympanic membrane, ear canal and external ear normal.      Nose: Nose normal.      Mouth/Throat:      Lips: Pink. Mouth: Mucous membranes are moist.      Pharynx: Uvula midline. Pharyngeal swelling, oropharyngeal exudate, posterior oropharyngeal erythema and uvula swelling present. Tonsils: Tonsillar exudate present. No tonsillar abscesses. 2+ on the right. 2+ on the left. Comments: Beefy red tonsils, Grade 2, slightly larger compared to yesterday. Uvula demonstrates more swelling as well. Eyes:      Extraocular Movements: Extraocular movements intact. Conjunctiva/sclera: Conjunctivae normal.      Pupils: Pupils are equal, round, and reactive to light. Neck:      Trachea: Trachea normal.   Cardiovascular:      Rate and Rhythm: Normal rate and regular rhythm. Pulses: Normal pulses. Heart sounds: Normal heart sounds, S1 normal and S2 normal.   Pulmonary:      Effort: Pulmonary effort is normal.      Breath sounds: Normal breath sounds and air entry. Musculoskeletal:      Cervical back: Full passive range of motion without pain, normal range of motion and neck supple. Lymphadenopathy:      Head:      Right side of head: Tonsillar adenopathy present. No submental, submandibular, preauricular, posterior auricular or occipital adenopathy. Left side of head: Tonsillar adenopathy present. No submental, submandibular, preauricular, posterior auricular or occipital adenopathy. Cervical: Cervical adenopathy present. Right cervical: No superficial, deep or posterior cervical adenopathy. Left cervical: No superficial, deep or posterior cervical adenopathy. Skin:     Capillary Refill: Capillary refill takes less than 2 seconds.    Neurological: General: No focal deficit present. Mental Status: She is alert and oriented to person, place, and time. Psychiatric:         Mood and Affect: Mood normal.         Behavior: Behavior normal. Behavior is cooperative. Thought Content: Thought content normal.        /80 (Site: Right Upper Arm, Position: Sitting)   Pulse 88   Temp 98.2 °F (36.8 °C) (Oral)   Resp 16   LMP 07/03/2022   SpO2 100%     ASSESSMENT and PLAN  Diagnoses and all orders for this visit:    Strep pharyngitis  -     methylPREDNISolone (MEDROL DOSEPACK) 4 MG tablet; Take by mouth. Start medrol dosepak for oropharyngeal swelling and pain. Continue Amoxil. If she has not improvement tomorrow, she is to return here for reevaluation. If she shows no improvement, rather worsening, consider starting  Keflex. Recommended adding Tagemet or Pepid for GI protection since starting Medrol dose astrid. Patient Education:  Risk vs. Benefits of medications discussed. Discussed need to take entire course of antibiotic. Increase fluid intake. May take Tylenol/Motrin PRN for fever, body aches, and throat pain. Reviewed warm salt water gargles, chloroseptic spray PRN, lozenges, and cool mist humidifier for throat pain. Good hand hygiene. Dispose of and replace toothbrush. Seek ED care if severe throat/neck swelling develops, SOB, or CP. Patient voices understanding and agrees with the plan of care described above.

## 2022-07-28 ENCOUNTER — TELEPHONE (OUTPATIENT)
Dept: OCCUPATIONAL MEDICINE | Age: 37
End: 2022-07-28

## 2022-09-27 ENCOUNTER — OFFICE VISIT (OUTPATIENT)
Dept: OCCUPATIONAL MEDICINE | Age: 37
End: 2022-09-27

## 2022-09-27 ENCOUNTER — TELEPHONE (OUTPATIENT)
Dept: OCCUPATIONAL MEDICINE | Age: 37
End: 2022-09-27

## 2022-09-27 VITALS
TEMPERATURE: 99.5 F | DIASTOLIC BLOOD PRESSURE: 86 MMHG | WEIGHT: 239.8 LBS | OXYGEN SATURATION: 98 % | RESPIRATION RATE: 17 BRPM | HEART RATE: 96 BPM | BODY MASS INDEX: 45.27 KG/M2 | SYSTOLIC BLOOD PRESSURE: 123 MMHG | HEIGHT: 61 IN

## 2022-09-27 DIAGNOSIS — R50.81 FEVER IN OTHER DISEASES: ICD-10-CM

## 2022-09-27 DIAGNOSIS — Z86.16 HISTORY OF COVID-19: ICD-10-CM

## 2022-09-27 DIAGNOSIS — J98.01 COUGH DUE TO BRONCHOSPASM: Primary | ICD-10-CM

## 2022-09-27 DIAGNOSIS — R05.3 CHRONIC COUGH: ICD-10-CM

## 2022-09-27 LAB
EXP DATE SOLUTION: NORMAL
EXP DATE SWAB: NORMAL
INFLUENZA A ANTIGEN, POC: NEGATIVE
INFLUENZA B ANTIGEN, POC: NEGATIVE
LOT NUMBER SOLUTION: NORMAL
LOT NUMBER SWAB: NORMAL
SARS-COV-2 RNA, POC: NEGATIVE
VALID INTERNAL CONTROL, POC: YES

## 2022-09-27 PROCEDURE — 87635 SARS-COV-2 COVID-19 AMP PRB: CPT

## 2022-09-27 PROCEDURE — 99214 OFFICE O/P EST MOD 30 MIN: CPT

## 2022-09-27 PROCEDURE — 87804 INFLUENZA ASSAY W/OPTIC: CPT

## 2022-09-27 RX ORDER — ALBUTEROL SULFATE 90 UG/1
2 AEROSOL, METERED RESPIRATORY (INHALATION) 4 TIMES DAILY PRN
Qty: 18 G | Refills: 5 | Status: SHIPPED | OUTPATIENT
Start: 2022-09-27

## 2022-09-27 RX ORDER — M-VIT,TX,IRON,MINS/CALC/FOLIC 27MG-0.4MG
1 TABLET ORAL DAILY
COMMUNITY

## 2022-09-27 RX ORDER — SODIUM CHLORIDE FOR INHALATION 0.9 %
3 VIAL, NEBULIZER (ML) INHALATION 4 TIMES DAILY PRN
Status: SHIPPED | OUTPATIENT
Start: 2022-09-27

## 2022-09-27 ASSESSMENT — ENCOUNTER SYMPTOMS
EYE ITCHING: 0
SINUS PAIN: 0
SINUS PRESSURE: 0
EYE DISCHARGE: 0
RHINORRHEA: 1
WHEEZING: 1
NAUSEA: 0
COUGH: 1
SORE THROAT: 0
HEARTBURN: 0
EYE PAIN: 0
EYE REDNESS: 0
ABDOMINAL PAIN: 0
DIARRHEA: 0
SHORTNESS OF BREATH: 1
HEMOPTYSIS: 0
VOMITING: 0

## 2022-09-27 ASSESSMENT — PATIENT HEALTH QUESTIONNAIRE - PHQ9
SUM OF ALL RESPONSES TO PHQ9 QUESTIONS 1 & 2: 0
2. FEELING DOWN, DEPRESSED OR HOPELESS: 0
1. LITTLE INTEREST OR PLEASURE IN DOING THINGS: 0
SUM OF ALL RESPONSES TO PHQ QUESTIONS 1-9: 0

## 2022-09-27 NOTE — LETTER
[address] [place] [address]  Phone: [phone]  Fax: [phone]    JAMES Horvath NP        September 27, 2022     Patient: Haritha Smith   YOB: 1985   Date of Visit: 9/27/2022       To Whom It May Concern: It is my medical opinion that Damon Bruce should remain out of work until Thursday, September 29th as long as feeling better and fever free for 24 hours without antipyretics. .    If you have any questions or concerns, please don't hesitate to call.     Sincerely,        JAMES Horvath NP   Family Nurse Practitioner     [place]  [facility]  [phone]  [email or web address]  Voicemail: [phone]

## 2022-09-27 NOTE — PROGRESS NOTES
PROGRESS NOTE    SUBJECTIVE     Ofelia Salvador is a 40 y.o. female seen for:    Chief Complaint    Shortness of Breath       Patient  presents with fever, cough, and shortness of breath today; some diaphoresis noted and likely due to her low-grade fever. Patient states that she has been sick since Saturday but was still up and going grocery shopping. Patient went home early from work yesterday and slept and woke up and felt okay and then went to work this morning and felt much worse. Patient states that she doesn't feel feverish and is not having chills. Patient states that other people in the house are sick as well, including her 79-year-old mother and her children, but none are as bad as her. Patient notes that she has had a chronic cough with sputum that waxes and wanes and gets worse about every 6 weeks since having COVID-19 in May 2022 . Patient has not followed up with a pulmonologist. Patient denies a history of asthma and patient states that she does not usually have wheezing with viral URIs in the past. Patient notes that she does not have a primary care physician either. Cough  This is a chronic (chronic cough since May but worse currently) problem. The current episode started more than 1 month ago. The problem has been waxing and waning. The problem occurs constantly. The cough is Productive of sputum. Associated symptoms include a fever, myalgias, nasal congestion, postnasal drip, rhinorrhea, shortness of breath and wheezing. Pertinent negatives include no chest pain, chills, ear congestion, ear pain, eye redness, headaches, heartburn, hemoptysis, rash, sore throat, sweats or weight loss. Exacerbated by: viral URIs. She has tried rest for the symptoms. The treatment provided no relief. There is no history of asthma, bronchiectasis, bronchitis, COPD, emphysema, environmental allergies or pneumonia.      Current Outpatient Medications   Medication Sig Dispense Refill    Nebulizers (Shira Meneses COMPACT MINI NEBULIZER) MISC 1 each by Does not apply route 4 times daily Use with albuterol solution as directed 1 each 0    Multiple Vitamins-Minerals (THERAPEUTIC MULTIVITAMIN-MINERALS) tablet Take 1 tablet by mouth daily      sertraline (ZOLOFT) 100 MG tablet Take 150 mg by mouth daily      albuterol sulfate HFA (VENTOLIN HFA) 108 (90 Base) MCG/ACT inhaler Inhale 2 puffs into the lungs 4 times daily as needed for Wheezing 18 g 5     Current Facility-Administered Medications   Medication Dose Route Frequency Provider Last Rate Last Admin    sodium chloride nebulizer 0.9 % solution 3 mL  3 mL Nebulization 4x Daily PRN Lula Ojeda Rm, APRN - NP          No Known Allergies  Past Medical History:   Diagnosis Date    Abnormal Papanicolaou smear of cervix     Anxiety     Atypical mole 2019    Atypical mole 2019    Depression     Elevated prolactin level     Gastric ulcer     GERD (gastroesophageal reflux disease)     H/O seasonal allergies     History of dysplastic nevus 2019    from mid/upper back path report dysplastic completely excised - see scanned documents     Infertility, female     anovulation, male factor    LGSIL (low grade squamous intraepithelial dysplasia)     Morbid obesity ([address] 2019    Morbid obesity ([address]     Varicose vein of leg     RLE     Past Surgical History:   Procedure Laterality Date     SECTION  2018    DILATION AND CURETTAGE OF UTERUS  17    Hysteroscopy/polypectomy    GYN       IUI's    HYSTEROSCOPY      uterine polyps    LEEP      PELVIC LAPAROSCOPY  16    Laser       Social History     Tobacco Use    Smoking status: Former    Smokeless tobacco: Never    Tobacco comments:     Quit smoking: former some day smoker for about 2 yrs in her 20's   Substance Use Topics    Alcohol use: Not Currently     Alcohol/week: 1.7 standard drinks    Drug use: Never        Family History   Problem Relation Age of Onset    No Known Problems Father     Rheum Arthritis Brother     Other Mother         ulcerative Colitis    Celiac Disease Sister        Review of Systems   Constitutional:  Positive for fatigue and fever. Negative for chills, diaphoresis and weight loss. HENT:  Positive for congestion, postnasal drip and rhinorrhea. Negative for ear pain, sinus pressure, sinus pain and sore throat. Eyes:  Negative for pain, discharge, redness, itching and visual disturbance. Respiratory:  Positive for cough, shortness of breath and wheezing. Negative for hemoptysis. Cardiovascular:  Negative for chest pain. Gastrointestinal:  Negative for abdominal pain, diarrhea, heartburn, nausea and vomiting. Musculoskeletal:  Positive for myalgias. Negative for neck stiffness. Skin:  Negative for rash. Allergic/Immunologic: Negative for environmental allergies. Neurological:  Negative for dizziness, syncope, weakness, light-headedness and headaches. Psychiatric/Behavioral:  Negative for dysphoric mood and suicidal ideas. OBJECTIVE    /86 (Site: Right Upper Arm, Position: Sitting, Cuff Size: Medium Adult)   Pulse 96   Temp 99.5 °F (37.5 °C) (Temporal)   Resp 17   Ht 5' 1\" (1.549 m)   Wt 239 lb 12.8 oz (108.8 kg)   LMP 09/25/2022 (Exact Date)   SpO2 98%   BMI 45.31 kg/m²      Physical Exam  Vitals reviewed. Constitutional:       General: She is not in acute distress. Appearance: Normal appearance. She is ill-appearing and diaphoretic. She is not toxic-appearing. HENT:      Head: Normocephalic. Right Ear: Hearing, tympanic membrane, ear canal and external ear normal. There is no impacted cerumen. Left Ear: Hearing, tympanic membrane, ear canal and external ear normal. There is no impacted cerumen. Nose: Congestion and rhinorrhea present. Right Nostril: No foreign body, epistaxis or occlusion. Left Nostril: No foreign body, epistaxis or occlusion. Right Turbinates: Not swollen. Left Turbinates: Not swollen. Right Sinus: No maxillary sinus tenderness or frontal sinus tenderness. Left Sinus: No maxillary sinus tenderness or frontal sinus tenderness. Comments: Reddened turbinates     Mouth/Throat:      Mouth: Mucous membranes are moist.      Pharynx: Oropharynx is clear. Uvula midline. Posterior oropharyngeal erythema present. No pharyngeal swelling, oropharyngeal exudate or uvula swelling. Tonsils: No tonsillar exudate or tonsillar abscesses. 0 on the right. 0 on the left. Comments: Cobblestone and erythemic appearance of pharynx  Eyes:      General: No scleral icterus. Right eye: No discharge. Left eye: No discharge. Extraocular Movements: Extraocular movements intact. Conjunctiva/sclera: Conjunctivae normal.   Cardiovascular:      Rate and Rhythm: Normal rate and regular rhythm. Pulses: Normal pulses. Radial pulses are 2+ on the right side and 2+ on the left side. Heart sounds: Normal heart sounds. No murmur heard. No friction rub. No gallop. Pulmonary:      Effort: Pulmonary effort is normal. No respiratory distress. Breath sounds: No stridor. Wheezing present. No rhonchi or rales. Comments: Inspiratory and expiratory wheezes throughout all lobes anteriorly and posteriorly  Musculoskeletal:         General: No swelling. Normal range of motion. Cervical back: Normal range of motion and neck supple. No rigidity. Lymphadenopathy:      Cervical: No cervical adenopathy. Skin:     General: Skin is warm. Capillary Refill: Capillary refill takes less than 2 seconds. Coloration: Skin is not jaundiced or pale. Findings: No erythema or rash. Neurological:      General: No focal deficit present. Mental Status: She is alert and oriented to person, place, and time. Mental status is at baseline. Motor: No weakness.       Coordination: Coordination normal.      Gait: Gait normal.   Psychiatric:         Mood and Affect: Mood normal.         Behavior: Behavior normal.         Thought Content: Thought content normal.         Judgment: Judgment normal.       Results for orders placed or performed in visit on 09/27/22   AMB POC RAPID INFLUENZA TEST   Result Value Ref Range    Valid Internal Control, POC yes     Influenza A Antigen, POC Negative Negative    Influenza B Antigen, POC Negative Negative   AMB POC COVID-19 COV   Result Value Ref Range    SARS-COV-2 RNA, POC Negative     Lot number swab      EXP date swab      Lot number solution      EXP date solution          Lab Results   Component Value Date    WBC 8.7 03/01/2021    HGB 10.9 (L) 03/02/2021    HCT 31.7 (L) 03/02/2021    MCV 87.2 03/01/2021     03/01/2021       No results found for: NA, K, CL, CO2, BUN, CREATININE, GLUCOSE, CALCIUM, PROT, LABALBU, BILITOT, ALKPHOS, AST, ALT, LABGLOM, GFRAA, AGRATIO, GLOB        No results found for: NA, K, CL, CO2, BUN, CREATININE, GLUCOSE, CALCIUM     Lab Results   Component Value Date    LABA1C 4.8 07/30/2020     Lab Results   Component Value Date    EAG 91 07/30/2020       No results found for: CHOL  No results found for: TRIG  No results found for: HDL  No results found for: LDLCHOLESTEROL, LDLCALC  No results found for: LABVLDL, VLDL  No results found for: CHOLHDLRATIO    No results found for: TSHFT4, TSH, TSHREFLEX, EGM4QYN    ASSESSMENT and PLAN    Lizeth was seen today for shortness of breath. Diagnoses and all orders for this visit:    Cough due to bronchospasm  -     albuterol sulfate HFA (VENTOLIN HFA) 108 (90 Base) MCG/ACT inhaler; Inhale 2 puffs into the lungs 4 times daily as needed for Wheezing  -     Discontinue: albuterol (PROVENTIL) (5 MG/ML) 0.5% nebulizer solution;  Take 1 mL by nebulization 4 times daily as needed for Wheezing  -     Nebulizers (AIRIAL COMPACT MINI NEBULIZER) MISC; 1 each by Does not apply route 4 times daily Use with albuterol solution as directed  -     AMB POC RAPID INFLUENZA TEST  -     AMB POC COVID-19 COV  -     sodium chloride nebulizer 0.9 % solution 3 mL    Chronic cough  -     [facility]    History of COVID-19  -     [facility]    Fever in other diseases  -     AMB POC RAPID INFLUENZA TEST  -     AMB POC COVID-19 COV    Explained to patient that she tested negative for COVID-19 and influenza today. Patient advised to take some acetaminophen or ibuprofen back at her desk and see if her symptoms improve enough for her to drive home and  albuterol inhaler as well as albuterol nebulizer from the pharmacy. If shortness of breath is not improving, advised to go to ER. If albuterol nebulizer does not help symptoms, will also need to go to the ER for further treatment and evaluation. If symptoms improve somewhat but persist, call the employee wellness center and a chest x-ray can be ordered. Call [facility] to [facility] to get an appointment and follow up on chronic cough. Advised patient to get rest and increase fluids. Work note provided to be off the rest of today and tomorrow as well. Call the employee wellness center if not feeling well enough to return to work on Thursday 9/29/22. Counseled on benefits of having a primary care provider which includes, but is not limited to, continuity of care and having a medical home when concerns arise. Also enforced that onsite clinic policy states that we are not to take the place of a primary care provider, pt verbalized understanding. SEs and risk vs benefits associated with medications prescribed discussed with patient who verbalized understanding. Pt verbalized understanding and agreement with plan of care. RTC for persisting/worsening symptoms or new complaints that arise.  Discussed signs and symptoms that would warrant immediate evaluation including, but not limited to HA, blurred vision, speech disturbance, difficulty with ambulation/gait, numbness,

## 2022-09-28 NOTE — TELEPHONE ENCOUNTER
Called patient to follow up on her symptoms and make sure she was able to get her prescriptions from the pharmacy. Left voicemail and asked her to call the [id number] back at [phone].

## 2022-10-06 ENCOUNTER — OFFICE VISIT (OUTPATIENT)
Dept: OCCUPATIONAL MEDICINE | Age: 37
End: 2022-10-06

## 2022-10-06 VITALS
HEART RATE: 96 BPM | DIASTOLIC BLOOD PRESSURE: 79 MMHG | SYSTOLIC BLOOD PRESSURE: 122 MMHG | WEIGHT: 235 LBS | RESPIRATION RATE: 16 BRPM | BODY MASS INDEX: 44.37 KG/M2 | OXYGEN SATURATION: 98 % | TEMPERATURE: 98.3 F | HEIGHT: 61 IN

## 2022-10-06 DIAGNOSIS — J02.0 STREP THROAT: Primary | ICD-10-CM

## 2022-10-06 LAB
GROUP A STREP ANTIGEN, POC: NEGATIVE
VALID INTERNAL CONTROL, POC: YES

## 2022-10-06 PROCEDURE — 99213 OFFICE O/P EST LOW 20 MIN: CPT

## 2022-10-06 PROCEDURE — 87430 STREP A AG IA: CPT

## 2022-10-06 RX ORDER — AMOXICILLIN 500 MG/1
500 CAPSULE ORAL 2 TIMES DAILY
Qty: 20 CAPSULE | Refills: 0 | Status: SHIPPED | OUTPATIENT
Start: 2022-10-06 | End: 2022-10-16

## 2022-10-06 ASSESSMENT — ENCOUNTER SYMPTOMS
EYE REDNESS: 0
RHINORRHEA: 0
TROUBLE SWALLOWING: 1
SORE THROAT: 1
VOMITING: 0
SHORTNESS OF BREATH: 0
VOICE CHANGE: 0
EYE ITCHING: 0
CHEST TIGHTNESS: 0
SINUS PRESSURE: 0
COUGH: 1
DIARRHEA: 0
NAUSEA: 0
SINUS PAIN: 0
WHEEZING: 0
ABDOMINAL PAIN: 0
EYE PAIN: 0
BLOOD IN STOOL: 0
EYE DISCHARGE: 0

## 2022-10-06 ASSESSMENT — PATIENT HEALTH QUESTIONNAIRE - PHQ9
2. FEELING DOWN, DEPRESSED OR HOPELESS: 0
SUM OF ALL RESPONSES TO PHQ QUESTIONS 1-9: 0
1. LITTLE INTEREST OR PLEASURE IN DOING THINGS: 0
SUM OF ALL RESPONSES TO PHQ QUESTIONS 1-9: 0
SUM OF ALL RESPONSES TO PHQ9 QUESTIONS 1 & 2: 0

## 2022-10-06 NOTE — LETTER
[address] [address]  Phone: [phone]  Fax: [phone]    JAMES Sandoval NP        October 6, 2022     Patient: Sabrina Wooten   YOB: 1985   Date of Visit: 10/6/2022       To Whom It May Concern: It is my medical opinion that Demetris Kang should remain out of work until Friday, October 7th unless feeling better and fever free for 24 hours without acetaminophen or ibuprofen. .    If you have any questions or concerns, please don't hesitate to call.     Sincerely,        JAMES Sandoval NP

## 2022-10-06 NOTE — PROGRESS NOTES
PROGRESS NOTE    SUBJECTIVE     Edward Flores is a 40 y.o. female seen for:    Chief Complaint    Pharyngitis     . Patient states that her cough and chest tightness improved since she was last seen in this clinic on 9/27/22. Patient states she still has a productive cough in the AM but then it progressively gets better throughout the day. Patient states she feels the cough is gradually getting better. Patient states that she has not needed to use her albuterol inhaler or nebulizer for over a week now. Patient states she has scheduled an appointment with a pulmonologist for 10/17/22. Patient states that a sore throat started last ngiht and she also had chills. Patient states that she had strep throat in July 2022 and it doesn't feel as bad as August, but feels how she felt at the beginning of her symptoms. Patient has not taken any ibuprofen or acetamionphen today. Patient went to bed early last night and slept well. Patient noticed white exudate and slight enlargement of her tonsils when she looks in the mirror. Patient states she had a similar exudate when she had + strep pharyngitis (documented on 7/26/22). Patient states that her children (4 years and 18 months) stayed out of  on Tuesday 10/4/22 due to vomiting and fevers but they did not complain of a sore throat. Patient states that both children are back in  and feeling well.       Current Outpatient Medications   Medication Sig Dispense Refill    Multiple Vitamins-Minerals (THERAPEUTIC MULTIVITAMIN-MINERALS) tablet Take 1 tablet by mouth daily      sertraline (ZOLOFT) 100 MG tablet Take 150 mg by mouth daily      amoxicillin (AMOXIL) 500 MG capsule Take 1 capsule by mouth 2 times daily for 10 days 20 capsule 0    albuterol sulfate HFA (VENTOLIN HFA) 108 (90 Base) MCG/ACT inhaler Inhale 2 puffs into the lungs 4 times daily as needed for Wheezing (Patient not taking: Reported on 10/6/2022) 18 g 5    Nebulizers (AIRIAL COMPACT MINI NEBULIZER) MISC 1 each by Does not apply route 4 times daily Use with albuterol solution as directed (Patient not taking: Reported on 10/6/2022) 1 each 0     Current Facility-Administered Medications   Medication Dose Route Frequency Provider Last Rate Last Admin    sodium chloride nebulizer 0.9 % solution 3 mL  3 mL Nebulization 4x Daily PRN JAMES Madison NP          No Known Allergies  Past Medical History:   Diagnosis Date    Abnormal Papanicolaou smear of cervix     Anxiety     Atypical mole 2019    Atypical mole 2019    Depression     Elevated prolactin level     Gastric ulcer     GERD (gastroesophageal reflux disease)     H/O seasonal allergies     History of dysplastic nevus 2019    from mid/upper back path report dysplastic completely excised - see scanned documents     Infertility, female     anovulation, male factor    LGSIL (low grade squamous intraepithelial dysplasia)     Morbid obesity ([address] 2019    Morbid obesity ([address]     Varicose vein of leg     RLE     Past Surgical History:   Procedure Laterality Date     SECTION  2018    DILATION AND CURETTAGE OF UTERUS  16    Hysteroscopy/polypectomy    GYN       IUI's    HYSTEROSCOPY      uterine polyps    LEEP      PELVIC LAPAROSCOPY  16    Laser       Social History     Tobacco Use    Smoking status: Former    Smokeless tobacco: Never    Tobacco comments:     Quit smoking: former some day smoker for about 2 yrs in her 20's   Vaping Use    Vaping Use: Never used   Substance Use Topics    Alcohol use: Not Currently     Alcohol/week: 1.7 standard drinks    Drug use: Never        Family History   Problem Relation Age of Onset    No Known Problems Father     Rheum Arthritis Brother     Other Mother         ulcerative Colitis    Celiac Disease Sister        Review of Systems   Constitutional:  Positive for chills. Negative for fatigue, fever and unexpected weight change.    HENT:  Positive for sore throat and trouble swallowing. Negative for congestion, drooling, ear pain, hearing loss, rhinorrhea, sinus pressure, sinus pain and voice change. Eyes:  Negative for pain, discharge, redness, itching and visual disturbance. Respiratory:  Positive for cough. Negative for chest tightness, shortness of breath and wheezing. Cough has persisted since end of September with a viral URI   Cardiovascular:  Negative for chest pain. Gastrointestinal:  Negative for abdominal pain, blood in stool, diarrhea, nausea and vomiting. Genitourinary:  Negative for dysuria. Musculoskeletal:  Negative for gait problem and myalgias. Skin:  Negative for rash. Neurological:  Negative for dizziness, syncope, weakness, light-headedness and headaches. Psychiatric/Behavioral:  Negative for dysphoric mood and suicidal ideas. OBJECTIVE    /79 (Site: Right Upper Arm, Position: Sitting, Cuff Size: Medium Adult)   Pulse 96   Temp 98.3 °F (36.8 °C) (Oral)   Resp 16   Ht 5' 1\" (1.549 m)   Wt 235 lb (106.6 kg)   LMP 09/25/2022 (Exact Date) Comment: using condoms for protection  SpO2 98%   BMI 44.40 kg/m²      Physical Exam  Vitals reviewed. Constitutional:       General: She is not in acute distress. Appearance: Normal appearance. She is not ill-appearing, toxic-appearing or diaphoretic. HENT:      Head: Normocephalic. Right Ear: Hearing, tympanic membrane, ear canal and external ear normal. There is no impacted cerumen. Left Ear: Hearing, tympanic membrane, ear canal and external ear normal. There is no impacted cerumen. Nose: No congestion or rhinorrhea. Right Nostril: No foreign body, epistaxis or occlusion. Left Nostril: No foreign body, epistaxis or occlusion. Right Turbinates: Not enlarged or swollen. Left Turbinates: Not enlarged or swollen. Right Sinus: No maxillary sinus tenderness or frontal sinus tenderness.       Left Sinus: No maxillary sinus tenderness or frontal sinus tenderness. Mouth/Throat:      Mouth: Mucous membranes are moist.      Pharynx: Oropharynx is clear. Uvula midline. Posterior oropharyngeal erythema present. No pharyngeal swelling, oropharyngeal exudate or uvula swelling. Tonsils: Tonsillar exudate present. No tonsillar abscesses. 1+ on the right. 1+ on the left. Comments: Erythemic appearance of pharynx  Eyes:      General: No scleral icterus. Right eye: No discharge. Left eye: No discharge. Extraocular Movements: Extraocular movements intact. Conjunctiva/sclera: Conjunctivae normal.   Cardiovascular:      Rate and Rhythm: Normal rate and regular rhythm. Pulses: Normal pulses. Radial pulses are 2+ on the right side and 2+ on the left side. Heart sounds: Normal heart sounds. No murmur heard. No friction rub. No gallop. Pulmonary:      Effort: Pulmonary effort is normal. No respiratory distress. Breath sounds: Normal breath sounds. No stridor. No wheezing, rhonchi or rales. Musculoskeletal:         General: No swelling. Normal range of motion. Cervical back: Normal range of motion and neck supple. No rigidity. Lymphadenopathy:      Cervical: Cervical adenopathy present. Skin:     General: Skin is warm and dry. Capillary Refill: Capillary refill takes less than 2 seconds. Coloration: Skin is not jaundiced or pale. Findings: No erythema or rash. Neurological:      General: No focal deficit present. Mental Status: She is alert and oriented to person, place, and time. Mental status is at baseline. Motor: No weakness. Coordination: Coordination normal.      Gait: Gait normal.   Psychiatric:         Mood and Affect: Mood normal.         Behavior: Behavior normal.         Thought Content:  Thought content normal.         Judgment: Judgment normal.       Results for orders placed or performed in visit on 10/06/22   AMB POC RAPID STREP TEST   Result Value Ref Range    Valid Internal Control, POC yes     Group A Strep Antigen, POC Negative Negative        Lab Results   Component Value Date    WBC 8.7 03/01/2021    HGB 10.9 (L) 03/02/2021    HCT 31.7 (L) 03/02/2021    MCV 87.2 03/01/2021     03/01/2021       No results found for: NA, K, CL, CO2, BUN, CREATININE, GLUCOSE, CALCIUM, PROT, LABALBU, BILITOT, ALKPHOS, AST, ALT, LABGLOM, GFRAA, AGRATIO, GLOB        No results found for: NA, K, CL, CO2, BUN, CREATININE, GLUCOSE, CALCIUM     Lab Results   Component Value Date    LABA1C 4.8 07/30/2020     Lab Results   Component Value Date    EAG 91 07/30/2020       No results found for: CHOL  No results found for: TRIG  No results found for: HDL  No results found for: LDLCHOLESTEROL, LDLCALC  No results found for: LABVLDL, VLDL  No results found for: CHOLHDLRATIO    No results found for: TSHFT4, TSH, TSHREFLEX, WGW4GBT    ASSESSMENT and ROCHELLE    Lizeth was seen today for pharyngitis. Diagnoses and all orders for this visit:    Strep throat  -     amoxicillin (AMOXIL) 500 MG capsule; Take 1 capsule by mouth 2 times daily for 10 days  -     AMB POC RAPID STREP TEST    Rapid strep test negative today. Centor score = 3 or 4 depending on whether a fever is present or will occur. Patient has no other cold/congestion symptoms and her cough is lingering from a previous viral URI at the end of September and is progressively improving as expected. Given previously documented strep infection on 7/26/22 and similar symptoms today as well as no other cold symptoms, I am inclined to treat the patient clinically with antibiotics despite a negative strep test today. Patient declined to send a throat culture at this time. Advised patient to fill her prescription for amoxicillin to treat a presumed strep infection. Take the antibiotics for the full 10 days and do not stop once you are feeling better.  Strep infections can cause heart and kidney problems if not treated completely so it is important to take the antibiotics for the full 10 days. Change your tooth brush about 24-48 hours after starting the antibiotics. Stay home from work today and for at least 24 hours on antibiotics. You can return to work after you've been on the antibiotics for 24 hours, are fever free, and feeling better. Advised patient to call her pediatrician and see if her children should be tested for strep throat as well due to fever and vomiting. Antibiotics have risks and benefits one risk of antibiotics is that they remove good bacteria from the gastrointestinal (GI) tract and other areas of the body, leading to stomach upset, nausea, diarrhea, or yeast infections. Taking probiotics to replenish your good bacteria now and for 3-4 weeks after stopping the antibiotic can help reduce or prevent these side effects. You can get probiotics through a capsule supplement (look brands like Align or Culturelle over the counter) or through foods in your diet (such as, yogurt, Kefir, kimchi (a Kiser fermented cabbage dish), kombucha (a fermented tea),  sauerkraut (fermented cabbage). Please follow up if you experience frequent or watery diarrhea or abdominal pain. Reminded patient about the [employer] benefit offered through [address] [place] [address],[address], where employees get free phone support, as well as referrals for in-person consultations with clinical, legal and financial professionals. To access these resources, call [phone], text FIDELIA to [phone], or visit [employer] (password: [email or web address]). Return to the clinic for persisting/worsening symptoms or new complaints that arise. Discussed signs and symptoms that would warrant immediate evaluation including, but not limited to severe headache, blurred vision, speech disturbance, difficulty with ambulation/gait, numbness, tingling, weakness, syncope, chest pain, or shortness of breath.      Side effects and risk vs benefits associated with medications prescribed were discussed with patient who verbalized understanding. Kaur verbalized understanding and agreement with above plan of care. Counseled on benefits of having a primary care provider which includes, but is not limited to, continuity of care and having a medical home when concerns arise. Also enforced that onsite clinic policy states that we are not to take the place of a primary care provider. I have reviewed the patient's medication list, past medical, family, social, and surgical history in detail and updated the patient record appropriately.      JAMES Greenberg - NP

## 2022-10-07 RX ORDER — SERTRALINE HYDROCHLORIDE 100 MG/1
TABLET, FILM COATED ORAL
Qty: 60 TABLET | OUTPATIENT
Start: 2022-10-07

## 2022-10-14 DIAGNOSIS — R05.3 CHRONIC COUGH: Primary | ICD-10-CM

## 2022-10-17 ENCOUNTER — HOSPITAL ENCOUNTER (OUTPATIENT)
Dept: GENERAL RADIOLOGY | Age: 37
Discharge: HOME | End: 2022-10-20
Payer: COMMERCIAL

## 2022-10-17 DIAGNOSIS — R05.3 CHRONIC COUGH: ICD-10-CM

## 2022-10-17 PROCEDURE — 71046 X-RAY EXAM CHEST 2 VIEWS: CPT

## 2022-10-19 ENCOUNTER — HOSPITAL ENCOUNTER (OUTPATIENT)
Dept: GENERAL RADIOLOGY | Age: 37
Discharge: HOME | End: 2022-10-22

## 2022-10-19 ENCOUNTER — OFFICE VISIT (OUTPATIENT)
Dept: PULMONOLOGY | Age: 37
End: 2022-10-19
Payer: COMMERCIAL

## 2022-10-19 VITALS
DIASTOLIC BLOOD PRESSURE: 80 MMHG | OXYGEN SATURATION: 98 % | WEIGHT: 236 LBS | RESPIRATION RATE: 20 BRPM | HEART RATE: 69 BPM | TEMPERATURE: 98 F | BODY MASS INDEX: 44.56 KG/M2 | SYSTOLIC BLOOD PRESSURE: 120 MMHG | HEIGHT: 61 IN

## 2022-10-19 DIAGNOSIS — Z86.16 HISTORY OF COVID-19: ICD-10-CM

## 2022-10-19 DIAGNOSIS — R05.3 CHRONIC COUGH: Primary | ICD-10-CM

## 2022-10-19 DIAGNOSIS — K21.9 GASTROESOPHAGEAL REFLUX DISEASE, UNSPECIFIED WHETHER ESOPHAGITIS PRESENT: ICD-10-CM

## 2022-10-19 LAB
EXPIRATORY TIME: NORMAL
FEF 25-75% %PRED-PRE: NORMAL
FEF 25-75% PRED: NORMAL
FEF 25-75%-PRE: NORMAL
FEV1 %PRED-PRE: 100 %
FEV1 PRED: 2.82 L
FEV1/FVC %PRED-PRE: NORMAL
FEV1/FVC PRED: NORMAL
FEV1/FVC: 81 %
FEV1: NORMAL
FVC %PRED-PRE: 3 %
FVC PRED: NORMAL
FVC: 3.5 L
PEF %PRED-PRE: NORMAL
PEF PRED: NORMAL
PEF-PRE: NORMAL

## 2022-10-19 PROCEDURE — 99204 OFFICE O/P NEW MOD 45 MIN: CPT | Performed by: INTERNAL MEDICINE

## 2022-10-19 PROCEDURE — 94010 BREATHING CAPACITY TEST: CPT | Performed by: INTERNAL MEDICINE

## 2022-10-19 ASSESSMENT — PULMONARY FUNCTION TESTS
FVC: 3.50
FEV1_PREDICTED: 2.82
FEV1/FVC: 81
FVC_PERCENT_PREDICTED_PRE: 3
FEV1_PERCENT_PREDICTED_PRE: 100

## 2022-10-19 NOTE — PROGRESS NOTES
[address] [place]. [address] 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 Her initial COVID diagnosis was in 2020 she recovered from both without significant debility, none of the COVID infections required hospitalization, oxygen, steroids. After her second bout of COVID in May of this year she started coughing, the cough initially was constant and then became intermittent with periods of worsening that varied in duration between a week and 2. She denies any wheezing, she does have heartburn, no postnasal drip, no new medications except for Zoloft. She is not vaccinated for COVID. She denies history of childhood asthma although she does have seasonal allergies, she has a brother who has asthma but no first-degree relatives otherwise with allergy. The cough presents predominantly in the morning time with occasional sputum production, it is less throughout the day but also present when she say laughs or talks for prolonged period of time. Her spirometry in the office today was normal, fractional excretion of nitric oxide assessment was also normal with low likelihood of eosinophilic inflammation. She does not take any antacids currently for reflux, she has known triggers for reflux. As I mentioned there is no postnasal drip. She does not smoke or vape.       Past Medical History:   Diagnosis Date    Abnormal Papanicolaou smear of cervix     Anxiety     Atypical mole 12/5/2019    Atypical mole 12/5/2019    Depression     Elevated prolactin level     Gastric ulcer     GERD (gastroesophageal reflux disease)     H/O seasonal allergies     History of dysplastic nevus 2019    from mid/upper back path report dysplastic completely excised - see scanned documents     Infertility, female     anovulation, male factor    LGSIL (low grade squamous intraepithelial dysplasia)     Morbid obesity ([address] 2019    Morbid obesity ([address]     Varicose vein of leg     RLE         Patient Active Problem List   Diagnosis    H/O  section    High risk multigravida in third trimester    History of dysplastic nevus    Obesity affecting pregnancy in third trimester    Depression affecting pregnancy in third trimester, antepartum    COVID-19 affecting pregnancy in third trimester    Diet controlled gestational diabetes mellitus (GDM) in third trimester    Previous  section complicating pregnancy    39 weeks gestation of pregnancy           Past Surgical History:   Procedure Laterality Date     SECTION  2018    DILATION AND CURETTAGE OF UTERUS  16    Hysteroscopy/polypectomy    GYN       IUI's    HYSTEROSCOPY      uterine polyps    LEEP      PELVIC LAPAROSCOPY  16    Laser         Social History     Socioeconomic History    Marital status:      Spouse name: Not on file    Number of children: Not on file    Years of education: Not on file    Highest education level: Not on file   Occupational History    Not on file   Tobacco Use    Smoking status: Former    Smokeless tobacco: Never    Tobacco comments:     Quit smoking: former some day smoker for about 2 yrs in her 20's   Vaping Use    Vaping Use: Never used   Substance and Sexual Activity    Alcohol use: Not Currently     Alcohol/week: 1.7 standard drinks    Drug use: Never    Sexual activity: Yes     Partners: Male     Birth control/protection: None     Comment:  - same partner x many years   Other Topics Concern    Not on file   Social History Narrative    Not on file     Social Determinants of Health     Financial Resource Strain: Not on file   Food Insecurity: Not on file   Transportation Needs: Not on file   Physical Activity: Not on file   Stress: Not on file   Social Connections: Not on file   Intimate Partner Violence: Not on file   Housing Stability: Not on file         Family History   Problem Relation Age of Onset    No Known Problems Father     Rheum Arthritis Brother     Other Mother         ulcerative Colitis    Celiac Disease Sister          No Known Allergies      Current Outpatient Medications   Medication Sig    Multiple Vitamins-Minerals (THERAPEUTIC MULTIVITAMIN-MINERALS) tablet Take 1 tablet by mouth daily    sertraline (ZOLOFT) 100 MG tablet Take 150 mg by mouth daily    albuterol sulfate HFA (VENTOLIN HFA) 108 (90 Base) MCG/ACT inhaler Inhale 2 puffs into the lungs 4 times daily as needed for Wheezing (Patient not taking: Reported on 10/6/2022)    Nebulizers ([employer] COMPACT MINI NEBULIZER) MISC 1 each by Does not apply route 4 times daily Use with albuterol solution as directed (Patient not taking: Reported on 10/6/2022)     Current Facility-Administered Medications   Medication Dose Route Frequency    sodium chloride nebulizer 0.9 % solution 3 mL  3 mL Nebulization 4x Daily PRN           Review of Systems          PHYSICAL EXAM:    Vitals:    10/19/22 0905   BP: 120/80   Pulse: 69   Resp: 20   Temp: 98 °F (36.7 °C)   SpO2: 98%        GENERAL APPEARANCE:   The patient is over  weight and in no respiratory distress. HEENT:   PERRL. Conjunctivae unremarkable. Nasal mucosa is without epistaxis, exudate, or polyps. Gums and dentition are unremarkable. There is no oropharyngeal narrowing. TMs are clear. NECK/LYMPHATIC:   Symmetrical with no elevation of jugular venous pulsation. Trachea midline. No thyroid enlargement. No cervical adenopathy. LUNGS:   Normal respiratory effort with symmetrical lung expansion. Breath sounds clear to auscultation bilaterally with no rhonchi wheezing or crackles.    HEART:   There is a regular rate and rhythm. No murmur, rub, or gallop. There is no edema in the lower extremities. ABDOMEN:   Soft and non-tender. No hepatosplenomegaly. Bowel sounds are normal.     SKIN:   There are no rashes, cyanosis, jaundice, or ecchymosis present. EXTREMITIES:   The extremities are unremarkable without clubbing, cyanosis, joint inflammation, degenerative, or ischemic change. MUSCULOSKELETAL:   There is no abnormal tone, muscle atrophy, or abnormal movement present. NEURO:   The patient is alert and oriented to person, place, and time. Memory appears intact and mood is normal.  No gross sensorimotor deficits are present. DIAGNOSTIC TESTS:      CXR:     Spirometry:      FeNO testing 2022:    Fractional exhaled nitric oxide testing was performed with level of 11 ppb. This is indicative of low likelihood of eosinophilic inflammation/asthma. Declan Sutton MD.      ASSESSMENT:  (Medical Decision Making)       Patient Active Problem List   Diagnosis    H/O  section    High risk multigravida in third trimester    History of dysplastic nevus    Obesity affecting pregnancy in third trimester    Depression affecting pregnancy in third trimester, antepartum    COVID-19 affecting pregnancy in third trimester    Diet controlled gestational diabetes mellitus (GDM) in third trimester    Previous  section complicating pregnancy    39 weeks gestation of pregnancy           PLAN:  Encounter Diagnoses   Name Primary? Chronic cough Yes    Gastroesophageal reflux disease, unspecified whether esophagitis present     History of COVID-19    The patient has persistent coughing after COVID 19 infection in May. She also has gastroesophageal reflux disease. Of all the causes of chronic coughing reflux and postinfectious cough syndrome or upper airway cough syndrome are the most likely here.   The pathophysiology and the pathogenesis of the most common causes of chronic cough were discussed with the patient in detail. She has a normal fractional excretion of nitric oxide and no history of allergy or asthma making cough variant asthma much less likely. The intermittent nature of the cough, the predilection towards early morning suggests that reflux may be playing a role in addition to postinfectious etiology and I discussed reflux strategies with the patient and suggested Prilosec over-the-counter prior to bedtime for at least 8 weeks. In terms of postinfectious cough, I explained to the patient that whether a treatment is implemented or not, symptoms are self-limited and usually resolve over time. In the era post COVID it is not defined how long the symptoms may last, pre-COVID I have had patients who had symptoms for up to 6 months. I explained that to the patient. I offered the patient bronchodilators with steroids which may or may not improve symptoms and shorten duration but given the explanations I gave she decided not to opt for additional medication. She will try over-the-counter Prilosec and return to the office for follow-up in 3 months. Any questions asked were answered seemingly to the patient satisfaction, total time spent on the case 45 minutes        Orders Placed This Encounter   Procedures    NITRIC OXIDE  GAS DETERMINATION    Spirometry Without Bronchodilator       No orders of the defined types were placed in this encounter. Aron Talley MD    Dictated using voice recognition software.  Proofread, but unrecognized voice recognition errors may exist.

## 2022-11-01 RX ORDER — SERTRALINE HYDROCHLORIDE 100 MG/1
150 TABLET, FILM COATED ORAL DAILY
Qty: 60 TABLET | Refills: 1 | Status: SHIPPED | OUTPATIENT
Start: 2022-11-01

## 2022-11-01 NOTE — TELEPHONE ENCOUNTER
Annual scheduled for 12/7/22 with Dr. Jacqui Villeda. Medication pended for Wilmer Ruggiero RN signature.

## 2022-11-01 NOTE — TELEPHONE ENCOUNTER
Pt left vm requesting refill of zoloft 100 mg. Says she also needs to set up annual with Dr. Joyce Rinne.

## 2022-12-07 ENCOUNTER — OFFICE VISIT (OUTPATIENT)
Dept: OBGYN CLINIC | Age: 37
End: 2022-12-07
Payer: COMMERCIAL

## 2022-12-07 VITALS
SYSTOLIC BLOOD PRESSURE: 120 MMHG | WEIGHT: 241 LBS | BODY MASS INDEX: 45.5 KG/M2 | DIASTOLIC BLOOD PRESSURE: 70 MMHG | HEIGHT: 61 IN

## 2022-12-07 DIAGNOSIS — Z12.4 SCREENING FOR CERVICAL CANCER: ICD-10-CM

## 2022-12-07 DIAGNOSIS — I49.1 ATRIAL CONTRACTIONS, PREMATURE: ICD-10-CM

## 2022-12-07 DIAGNOSIS — R00.2 FLUTTERING SENSATION OF HEART: ICD-10-CM

## 2022-12-07 DIAGNOSIS — Z11.51 SCREENING FOR HUMAN PAPILLOMAVIRUS (HPV): ICD-10-CM

## 2022-12-07 DIAGNOSIS — Z01.419 WELL WOMAN EXAM WITH ROUTINE GYNECOLOGICAL EXAM: Primary | ICD-10-CM

## 2022-12-07 DIAGNOSIS — Z76.89 ENCOUNTER TO ESTABLISH CARE: ICD-10-CM

## 2022-12-07 PROCEDURE — 99395 PREV VISIT EST AGE 18-39: CPT | Performed by: OBSTETRICS & GYNECOLOGY

## 2022-12-07 NOTE — PROGRESS NOTES
Patient presents today for   Chief Complaint   Patient presents with    Annual Exam     Pt c/o trouble losing weight and frequent PAC's pt states she does not have a PCP       LMP: Patient's last menstrual period was 2022.   Contraception: condoms        No Known Allergies  Current Outpatient Medications   Medication Sig Dispense Refill    sertraline (ZOLOFT) 100 MG tablet Take 1.5 tablets by mouth daily 60 tablet 1     Current Facility-Administered Medications   Medication Dose Route Frequency Provider Last Rate Last Admin    sodium chloride nebulizer 0.9 % solution 3 mL  3 mL Nebulization 4x Daily PRN JAMES Melgar NP         Past Medical History:   Diagnosis Date    Abnormal Papanicolaou smear of cervix     Anxiety     Atypical mole 2019    Atypical mole 2019    Depression     Elevated prolactin level     Gastric ulcer     GERD (gastroesophageal reflux disease)     H/O seasonal allergies     History of dysplastic nevus 2019    from mid/upper back path report dysplastic completely excised - see scanned documents     Infertility, female     anovulation, male factor    LGSIL (low grade squamous intraepithelial dysplasia)     Morbid obesity ([address] 2019    Morbid obesity ([address]     Varicose vein of leg     RLE     Past Surgical History:   Procedure Laterality Date     SECTION  2018    DILATION AND CURETTAGE OF UTERUS  17    Hysteroscopy/polypectomy    GYN       IUI's    HYSTEROSCOPY      uterine polyps    LEEP      PELVIC LAPAROSCOPY  16    Laser     Social History     Socioeconomic History    Marital status:      Spouse name: Not on file    Number of children: Not on file    Years of education: Not on file    Highest education level: Not on file   Occupational History    Not on file   Tobacco Use    Smoking status: Former    Smokeless tobacco: Never    Tobacco comments:     Quit smoking: former some day smoker for about 2 yrs in her 's   Vaping Use Vaping Use: Never used   Substance and Sexual Activity    Alcohol use: Not Currently     Alcohol/week: 1.7 standard drinks    Drug use: Never    Sexual activity: Yes     Partners: Male     Birth control/protection: None     Comment:  - same partner x many years   Other Topics Concern    Not on file   Social History Narrative    Not on file     Social Determinants of Health     Financial Resource Strain: Not on file   Food Insecurity: Not on file   Transportation Needs: Not on file   Physical Activity: Not on file   Stress: Not on file   Social Connections: Not on file   Intimate Partner Violence: Not on file   Housing Stability: Not on file     Family History   Problem Relation Age of Onset    No Known Problems Father     Rheum Arthritis Brother     Other Mother         ulcerative Colitis    Celiac Disease Sister      /70   Ht 5' 1\" (1.549 m)   Wt 241 lb (109.3 kg)   LMP 11/23/2022   BMI 45.54 kg/m²      ROS:  Constitutional: Negative. HENT: Negative. Eyes: Negative. Respiratory: Negative. Cardiovascular: Frequent PACs    Gastrointestinal: Negative. Endocrine: Negative. Genitourinary: Negative. Musculoskeletal: Negative. Allergic/Immunologic: Negative. Neurological: Negative. Hematological: Negative. Psychiatric/Behavioral: Negative. Physical Exam  Vitals signs reviewed. Constitutional:       General: She is not in acute distress. Appearance: Normal appearance. She is well-developed. She is not ill-appearing, toxic-appearing or diaphoretic. HENT:      Head: Normocephalic and atraumatic. Eyes:      General: No scleral icterus. Conjunctiva/sclera: Conjunctivae normal.      Pupils: Pupils are equal, round, and reactive to light. Neck:      Musculoskeletal: Normal range of motion and neck supple. Thyroid: No thyromegaly. Vascular: No JVD. Trachea: No tracheal deviation.    Cardiovascular:      Rate and Rhythm: Normal rate and regular rhythm. Heart sounds: Normal heart sounds. No murmur. No friction rub. No gallop. Pulmonary:      Effort: Pulmonary effort is normal. No respiratory distress. Breath sounds: Normal breath sounds. No stridor. No wheezing, rhonchi or rales. Chest:      Chest wall: No tenderness. Breasts:         Right: No inverted nipple, mass, nipple discharge, skin change or tenderness. Left: No inverted nipple, mass, nipple discharge, skin change or tenderness. Abdominal:      General: Bowel sounds are normal. There is no distension. Palpations: Abdomen is soft. There is no mass. Tenderness: There is no abdominal tenderness. There is no guarding or rebound. Genitourinary:     Labia:         Right: No rash, tenderness, lesion or injury. Left: No rash, tenderness, lesion or injury. Vagina: Normal. No signs of injury and foreign body. No vaginal discharge, erythema, tenderness or bleeding. Cervix: No cervical motion tenderness, discharge or friability. Uterus: Not enlarged and not tender. Adnexa:         Right: No mass, tenderness or fullness. Left: No mass, tenderness or fullness. Comments: External genitalia are normal in appearance. Examination of urethral meatus reveals location normal and size normal.   Examination of urethra shows no abnormalities. Examination of vaginal vault reveals no abnormalities. Cervix is long and closed without visible pathology. Pap smear collected. Uterine portion of bimanual exam reveals contour normal, shape regular, descensus absent, no nodularity, no tenderness and size 6 weeks GA. Adnexa and parametria exam reveals no masses, nontender. Visual examination of anus and perineum shows no abnormalities. Musculoskeletal: Normal range of motion. General: No tenderness. Lymphadenopathy:      Cervical: No cervical adenopathy.       Upper Body:      Right upper body: No supraclavicular adenopathy. Left upper body: No supraclavicular adenopathy. Lower Body: No right inguinal adenopathy. No left inguinal adenopathy. Skin:     General: Skin is warm and dry. Coloration: Skin is not pale. Findings: No erythema or rash. Neurological:      Mental Status: She is alert and oriented to person, place, and time. Cranial Nerves: No cranial nerve deficit. Motor: No abnormal muscle tone. Coordination: Coordination normal.   Psychiatric:         Behavior: Behavior normal.         Thought Content: Thought content normal.         Judgment: Judgment normal.         1. Well woman exam with routine gynecological exam    2. Screening for cervical cancer    3. Screening for human papillomavirus (HPV)    4. BMI 45.0-49.9, adult ([facility] [address]    5. Atrial contractions, premature    6. Fluttering sensation of heart    7. Encounter to establish care      Orders Placed This Encounter   Procedures    PAP IG, Aptima HPV and rfx 16/18,45 ([id number])    [facility]     No orders of the defined types were placed in this encounter. Routine age-appropriate well woman counseling was performed. 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 Discussed possibility of medication to help with weight loss. Pt doesn't have a PCP since Dr. Lonnie Dhillon left [facility]. Will refer her to a new PCP and she can discuss options and have eval for her frequent PACs which are a new development.      Return in about 1 year (around 12/7/2023), or if symptoms worsen or fail to improve, for Annual exam.

## 2022-12-09 LAB
CYTOLOGIST CVX/VAG CYTO: NORMAL
CYTOLOGY CVX/VAG DOC THIN PREP: NORMAL
HPV APTIMA: NEGATIVE
HPV GENOTYPE REFLEX: NORMAL
Lab: NORMAL
PATH REPORT.FINAL DX SPEC: NORMAL
STAT OF ADQ CVX/VAG CYTO-IMP: NORMAL

## 2023-01-30 ENCOUNTER — OFFICE VISIT (OUTPATIENT)
Dept: OBGYN CLINIC | Age: 38
End: 2023-01-30
Payer: COMMERCIAL

## 2023-01-30 VITALS
DIASTOLIC BLOOD PRESSURE: 60 MMHG | WEIGHT: 241 LBS | BODY MASS INDEX: 45.5 KG/M2 | HEIGHT: 61 IN | SYSTOLIC BLOOD PRESSURE: 122 MMHG

## 2023-01-30 DIAGNOSIS — Z3A.09 9 WEEKS GESTATION OF PREGNANCY: ICD-10-CM

## 2023-01-30 DIAGNOSIS — N92.6 MISSED MENSES: Primary | ICD-10-CM

## 2023-01-30 LAB
BASOPHILS # BLD: 0 K/UL (ref 0–0.2)
BASOPHILS NFR BLD: 0 % (ref 0–2)
DIFFERENTIAL METHOD BLD: ABNORMAL
EOSINOPHIL # BLD: 0 K/UL (ref 0–0.8)
EOSINOPHIL NFR BLD: 0 % (ref 0.5–7.8)
ERYTHROCYTE [DISTWIDTH] IN BLOOD BY AUTOMATED COUNT: 13.5 % (ref 11.9–14.6)
HBV SURFACE AG SER QL: NONREACTIVE
HCT VFR BLD AUTO: 40.2 % (ref 35.8–46.3)
HGB BLD-MCNC: 14.1 G/DL (ref 11.7–15.4)
IMM GRANULOCYTES # BLD AUTO: 0.1 K/UL (ref 0–0.5)
IMM GRANULOCYTES NFR BLD AUTO: 1 % (ref 0–5)
LYMPHOCYTES # BLD: 1.5 K/UL (ref 0.5–4.6)
LYMPHOCYTES NFR BLD: 14 % (ref 13–44)
MCH RBC QN AUTO: 31.2 PG (ref 26.1–32.9)
MCHC RBC AUTO-ENTMCNC: 35.1 G/DL (ref 31.4–35)
MCV RBC AUTO: 88.9 FL (ref 82–102)
MONOCYTES # BLD: 0.5 K/UL (ref 0.1–1.3)
MONOCYTES NFR BLD: 5 % (ref 4–12)
NEUTS SEG # BLD: 8.4 K/UL (ref 1.7–8.2)
NEUTS SEG NFR BLD: 80 % (ref 43–78)
NRBC # BLD: 0 K/UL (ref 0–0.2)
PLATELET # BLD AUTO: 220 K/UL (ref 150–450)
PMV BLD AUTO: 10.3 FL (ref 9.4–12.3)
RBC # BLD AUTO: 4.52 M/UL (ref 4.05–5.2)
RUBV IGG SERPL IA-ACNC: 61.5 IU/ML (ref 0–50)
WBC # BLD AUTO: 10.5 K/UL (ref 4.3–11.1)

## 2023-01-30 PROCEDURE — 76830 TRANSVAGINAL US NON-OB: CPT | Performed by: OBSTETRICS & GYNECOLOGY

## 2023-01-30 PROCEDURE — 99214 OFFICE O/P EST MOD 30 MIN: CPT | Performed by: OBSTETRICS & GYNECOLOGY

## 2023-01-30 NOTE — PROGRESS NOTES
Patient presents today for   Chief Complaint   Patient presents with    Amenorrhea       LMP: Patient's last menstrual period was 2022.   Contraception: none        No Known Allergies  Current Outpatient Medications   Medication Sig Dispense Refill    Prenatal Vit-Fe Fumarate-FA (PRENATAL PO) Take by mouth      sertraline (ZOLOFT) 100 MG tablet Take 1.5 tablets by mouth daily 60 tablet 1     Current Facility-Administered Medications   Medication Dose Route Frequency Provider Last Rate Last Admin    sodium chloride nebulizer 0.9 % solution 3 mL  3 mL Nebulization 4x Daily PRN JAMES Cody NP         Past Medical History:   Diagnosis Date    Abnormal Papanicolaou smear of cervix     Anxiety     Atypical mole 2019    Atypical mole 2019    Depression     Elevated prolactin level     Gastric ulcer     GERD (gastroesophageal reflux disease)     H/O seasonal allergies     History of dysplastic nevus 2019    from mid/upper back path report dysplastic completely excised - see scanned documents     Infertility, female     anovulation, male factor    LGSIL (low grade squamous intraepithelial dysplasia)     Morbid obesity ([address] 2019    Morbid obesity ([address]     Varicose vein of leg     RLE     Past Surgical History:   Procedure Laterality Date     SECTION  2018    DILATION AND CURETTAGE OF UTERUS  17    Hysteroscopy/polypectomy    GYN       IUI's    HYSTEROSCOPY      uterine polyps    LEEP      PELVIC LAPAROSCOPY  16    Laser     Social History     Socioeconomic History    Marital status:      Spouse name: Not on file    Number of children: Not on file    Years of education: Not on file    Highest education level: Not on file   Occupational History    Not on file   Tobacco Use    Smoking status: Former    Smokeless tobacco: Never    Tobacco comments:     Quit smoking: former some day smoker for about 2 yrs in her 20's   Vaping Use    Vaping Use: Never used Substance and Sexual Activity    Alcohol use: Not Currently     Alcohol/week: 1.7 standard drinks    Drug use: Never    Sexual activity: Yes     Partners: Male     Birth control/protection: None     Comment:  - same partner x many years   Other Topics Concern    Not on file   Social History Narrative    Not on file     Social Determinants of Health     Financial Resource Strain: Not on file   Food Insecurity: Not on file   Transportation Needs: Not on file   Physical Activity: Not on file   Stress: Not on file   Social Connections: Not on file   Intimate Partner Violence: Not on file   Housing Stability: Not on file     Family History   Problem Relation Age of Onset    No Known Problems Father     Rheum Arthritis Brother     Other Mother         ulcerative Colitis    Celiac Disease Sister      /60   Ht 5' 1\" (1.549 m)   Wt 241 lb (109.3 kg)   LMP 11/23/2022   BMI 45.54 kg/m²      ROS:  Constitutional: Negative. HENT: Negative. Eyes: Negative. Respiratory: Negative. Cardiovascular: Negative. Gastrointestinal: Negative. Endocrine: Negative. Genitourinary: Negative. Musculoskeletal: Negative. Allergic/Immunologic: Negative. Neurological: Negative. Hematological: Negative. Psychiatric/Behavioral: Negative. Physical Exam  Vitals reviewed. Constitutional:       General: She is not in acute distress. Appearance: Normal appearance. She is well-developed. She is not ill-appearing, toxic-appearing or diaphoretic. HENT:      Head: Normocephalic and atraumatic. Eyes:      General: No scleral icterus. Conjunctiva/sclera: Conjunctivae normal.      Pupils: Pupils are equal, round, and reactive to light. Pulmonary:      Effort: Pulmonary effort is normal. No respiratory distress. Abdominal:      General: There is no distension. Musculoskeletal:         General: Normal range of motion. Cervical back: Normal range of motion and neck supple. Skin:     General: Skin is warm and dry. Coloration: Skin is not jaundiced or pale. Findings: No erythema or rash. Neurological:      General: No focal deficit present. Mental Status: She is alert, oriented to person, place, and time and easily aroused. Motor: No abnormal muscle tone. Coordination: Coordination normal.   Psychiatric:         Mood and Affect: Mood normal.         Speech: Speech normal.         Behavior: Behavior normal. Behavior is cooperative. Thought Content: Thought content normal.         Judgment: Judgment normal. '     Imaging: See scanned report. IUP visualized with +FHT, CRL consistent with dates by LMP. ISAÍAS 8/30/23       1. Missed menses    2. 9 weeks gestation of pregnancy      Orders Placed This Encounter   Procedures    Culture, Urine    AMB POC US, TRANSVAGINAL    CBC with Auto Differential    RPR    Hepatitis B Surface Antigen    Rubella antibody, IgG    HIV 1/2 Ag/Ab, 4TH Generation,W Rflx Confirm    Hemoglobin A1C    ABO/Rh    Antibody Screen     No orders of the defined types were placed in this encounter. IUP confirmed with ultrasound office today after missed period. ROS reveals common discomforts of pregnancy. Reviewed anticipated ISAÍAS with patient based on LMP which is confirmed on imaging today. Questions encouraged and answered. Reviewed plan of care for supervision of pregnancy. Patient will return for a new OB education appt and have prenatal labs reviewed at that time. Encouraged taking a daily prenatal vitamin with folic acid and DHA which patient is already doing. Patient instructed to notify us or seek medical attention for any abdominal pain, cramping and vaginal bleeding, fever, or vomiting other than mild pregnancy sickness. Discussed baby ASA recommendations - 81mg from 12-14wks through 36 weeks to aid in prevention of preeclampsia in women with >1 risk factor.  Pt is recommended to take 2 low dose aspirins, thus 162mg daily.     Discussed option for genetic screening & MFM referral for nuchal translucency and any maternal indications from PMH/PSH/FH, etc. AMA status discussed. HgbA1c added to labs today. Hx of LTCD x 2. On zoloft, reviewed safety in pregnancy and no issues continuing this medication. Pt denies any complaints regarding this at this time. Greater than 30 minutes spent on same day of service in reviewing past OB hx, reviewing imaging and in face to face counseling, education, and examining the patient regarding missed period, pregnancy dx and POC for follow up visits. No follow-ups on file.

## 2023-01-31 LAB
ABO + RH BLD: NORMAL
BLOOD GROUP ANTIBODIES SERPL: NORMAL
EST. AVERAGE GLUCOSE BLD GHB EST-MCNC: 88 MG/DL
HBA1C MFR BLD: 4.7 % (ref 4.8–5.6)
HIV 1+2 AB+HIV1 P24 AG SERPL QL IA: NONREACTIVE
HIV 1/2 RESULT COMMENT: NORMAL
RPR SER QL: NONREACTIVE

## 2023-02-02 LAB
BACTERIA SPEC CULT: NORMAL
SERVICE CMNT-IMP: NORMAL

## 2023-02-14 ENCOUNTER — TELEPHONE (OUTPATIENT)
Dept: OBGYN CLINIC | Age: 38
End: 2023-02-14

## 2023-02-14 NOTE — LETTER
[address]  [address] [place] [id number]  Phone: [phone]  Fax: [phone], RN        February 14, 2023     [id number] [address]      Dear Leatha Chambers: We missed seeing you for a scheduled appointment at [address] on 2/14/23. We're sorry you were unable to keep your appointment and hope that you are doing well. We ask that you please call 24 hours in advance if you are unable to make your appointment, so that we can give that time to another patient in need. We care about you and the management of your healthcare and want to make sure that you follow up as recommended. To provide quality care and timely appointments to all our patients, you may be dismissed from the practice if you do not show for three (3) scheduled appointments within a 12-month period. We would like to continue treating your healthcare needs. Please call the office to reschedule your appointment, if needed.      Sincerely,        Dinora Skaggs RN

## 2023-02-14 NOTE — TELEPHONE ENCOUNTER
Pt request NOB Education visit to be a telephone visit. Contacted pt at appointment time, no answer. LM instructing pt to return call before 0900 to do appointment. Pt did not return call. No show letter mailed to address on file. Contacted pt to reschedule missed appointment. Appointment made for 2/22/23.

## 2023-02-22 ENCOUNTER — NURSE ONLY (OUTPATIENT)
Dept: OBGYN CLINIC | Age: 38
End: 2023-02-22

## 2023-02-22 DIAGNOSIS — O09.521 AMA (ADVANCED MATERNAL AGE) MULTIGRAVIDA 35+, FIRST TRIMESTER: Primary | ICD-10-CM

## 2023-02-22 NOTE — PROGRESS NOTES
NOB consult with pt via telephone encounter. Labs today: none. Offered pt the option of CF, SMA, and Fragile X carrier testing. Pt declines testing. Offered pt the option of genetic screening (1st screen vs Tetra vs NIPT). Pt is considering NIPT, will discuss with . Instructed pt on exercise/nutrition in pregnancy. Reviewed [facility] preg book. Advised pt on using [facility] for hospital needs and [facility] L&D for pregnancy related emergencies. Pt states understanding. NOB forms reviewed, pt gave verbal consent. Will provide forms and pregnancy book to pt at . [address] Medical Hx: Abnormal pap smear. Anxiety. Atypical mole. Depression. Elevated prolactin level. Gastric ulcer. GERD. H/o seasonal allergies. H/o dysplastic nevus. Infertility. LGSIL. Morbid obesity. Varicose vein of leg. Surgical Hx:  x 2. D&C. Hysteroscopy with Uterine polypectomy. LEEP. Pelvic Laparoscopy. Last Pap: 22 WNL. Pt notified std screening will be done at [facility]. Pt OB c/o: none    Fam hx any chromosomal or inheritable disorders: none     COVID Vaccine: declines    Flu Vaccine: up to date per pt    OB hx: G1-2018: c/b obesity. Primary C/S at 39w r/t breech presentation, 6lb15.5oz   G2-202: c/b obesity and GDM (?late onset, diet controlled). Repeat C/S at 39w2d, 8lb5.7oz   No hx GHTN per pt. Pt denies any other pregnancy/post partum complications    [facility]: Meliton Alves on 23 with .

## 2023-02-27 ENCOUNTER — ROUTINE PRENATAL (OUTPATIENT)
Dept: OBGYN CLINIC | Age: 38
End: 2023-02-27

## 2023-02-27 VITALS — BODY MASS INDEX: 45.91 KG/M2 | DIASTOLIC BLOOD PRESSURE: 70 MMHG | WEIGHT: 243 LBS | SYSTOLIC BLOOD PRESSURE: 122 MMHG

## 2023-02-27 DIAGNOSIS — O09.522 AMA (ADVANCED MATERNAL AGE) MULTIGRAVIDA 35+, SECOND TRIMESTER: Primary | ICD-10-CM

## 2023-02-27 DIAGNOSIS — O34.219 PREVIOUS CESAREAN SECTION COMPLICATING PREGNANCY: ICD-10-CM

## 2023-02-27 DIAGNOSIS — Z11.3 SCREEN FOR STD (SEXUALLY TRANSMITTED DISEASE): ICD-10-CM

## 2023-02-27 DIAGNOSIS — O99.212 OBESITY AFFECTING PREGNANCY IN SECOND TRIMESTER: ICD-10-CM

## 2023-02-27 PROBLEM — U07.1 COVID-19 AFFECTING PREGNANCY IN THIRD TRIMESTER: Status: RESOLVED | Noted: 2021-01-22 | Resolved: 2023-02-27

## 2023-02-27 PROBLEM — O98.513 COVID-19 AFFECTING PREGNANCY IN THIRD TRIMESTER: Status: RESOLVED | Noted: 2021-01-22 | Resolved: 2023-02-27

## 2023-02-27 PROBLEM — O99.213 OBESITY AFFECTING PREGNANCY IN THIRD TRIMESTER: Status: RESOLVED | Noted: 2017-09-13 | Resolved: 2023-02-27

## 2023-02-27 PROBLEM — O09.43 HIGH RISK MULTIGRAVIDA IN THIRD TRIMESTER: Status: RESOLVED | Noted: 2021-01-05 | Resolved: 2023-02-27

## 2023-02-27 PROBLEM — Z3A.39 39 WEEKS GESTATION OF PREGNANCY: Status: RESOLVED | Noted: 2021-03-01 | Resolved: 2023-02-27

## 2023-02-27 PROBLEM — O24.410 DIET CONTROLLED GESTATIONAL DIABETES MELLITUS (GDM) IN THIRD TRIMESTER: Status: RESOLVED | Noted: 2021-02-09 | Resolved: 2023-02-27

## 2023-02-27 PROCEDURE — 99902 PR PRENATAL VISIT: CPT | Performed by: OBSTETRICS & GYNECOLOGY

## 2023-02-27 RX ORDER — SERTRALINE HYDROCHLORIDE 100 MG/1
150 TABLET, FILM COATED ORAL DAILY
Qty: 60 TABLET | Refills: 11 | Status: SHIPPED | OUTPATIENT
Start: 2023-02-27

## 2023-02-27 SDOH — ECONOMIC STABILITY: INCOME INSECURITY: HOW HARD IS IT FOR YOU TO PAY FOR THE VERY BASICS LIKE FOOD, HOUSING, MEDICAL CARE, AND HEATING?: NOT HARD AT ALL

## 2023-02-27 SDOH — ECONOMIC STABILITY: FOOD INSECURITY: WITHIN THE PAST 12 MONTHS, THE FOOD YOU BOUGHT JUST DIDN'T LAST AND YOU DIDN'T HAVE MONEY TO GET MORE.: NEVER TRUE

## 2023-02-27 SDOH — ECONOMIC STABILITY: FOOD INSECURITY: WITHIN THE PAST 12 MONTHS, YOU WORRIED THAT YOUR FOOD WOULD RUN OUT BEFORE YOU GOT MONEY TO BUY MORE.: NEVER TRUE

## 2023-02-27 SDOH — ECONOMIC STABILITY: HOUSING INSECURITY
IN THE LAST 12 MONTHS, WAS THERE A TIME WHEN YOU DID NOT HAVE A STEADY PLACE TO SLEEP OR SLEPT IN A SHELTER (INCLUDING NOW)?: NO

## 2023-02-27 NOTE — PROGRESS NOTES
Chief Complaint   Patient presents with    Initial Prenatal Visit     Patient presents today for   Chief Complaint   Patient presents with    Initial Prenatal Visit     Has been having Has, mostly in the evenings. No major triggers, hasn't had to take anything for them. No VB or cramping. LMP: Patient's last menstrual period was 2022 (approximate).   Contraception: none      No Known Allergies  Current Outpatient Medications   Medication Sig Dispense Refill    sertraline (ZOLOFT) 100 MG tablet Take 1.5 tablets by mouth daily 60 tablet 11    Prenatal Vit-Fe Fumarate-FA (PRENATAL PO) Take by mouth       Current Facility-Administered Medications   Medication Dose Route Frequency Provider Last Rate Last Admin    sodium chloride nebulizer 0.9 % solution 3 mL  3 mL Nebulization 4x Daily PRN Vena Mask Pillow, APRN - NP         Past Medical History:   Diagnosis Date    Abnormal Papanicolaou smear of cervix     Anxiety     Atypical mole 2019    Depression     Elevated prolactin level     Gastric ulcer     GERD (gastroesophageal reflux disease)     H/O seasonal allergies     History of dysplastic nevus 2019    from mid/upper back path report dysplastic completely excised - see scanned documents     Infertility, female     anovulation, male factor    LGSIL (low grade squamous intraepithelial dysplasia)     Morbid obesity ([facility] [address] 2019    Varicose vein of leg     RLE     Past Surgical History:   Procedure Laterality Date     SECTION  2018    DILATION AND CURETTAGE OF UTERUS  17    Hysteroscopy/polypectomy    GYN       IUI's    HYSTEROSCOPY      uterine polyps    LEEP      PELVIC LAPAROSCOPY  16    Laser     Social History     Socioeconomic History    Marital status:      Spouse name: Not on file    Number of children: Not on file    Years of education: Not on file    Highest education level: Not on file   Occupational History    Not on file   Tobacco Use    Smoking status: Former    Smokeless tobacco: Never    Tobacco comments:     Quit smoking: former some day smoker for about 2 yrs in her 20's   Vaping Use    Vaping Use: Never used   Substance and Sexual Activity    Alcohol use: Not Currently     Alcohol/week: 1.7 standard drinks    Drug use: Never    Sexual activity: Yes     Partners: Male     Birth control/protection: None     Comment:  - same partner x many years   Other Topics Concern    Not on file   Social History Narrative    Not on file     Social Determinants of Health     Financial Resource Strain: Low Risk     Difficulty of Paying Living Expenses: Not hard at all   Food Insecurity: No Food Insecurity    Worried About Running Out of Food in the Last Year: Never true    [address] in the Last Year: Never true   Transportation Needs: Unknown    Lack of Transportation (Medical): Not on file    Lack of Transportation (Non-Medical): No   Physical Activity: Not on file   Stress: Not on file   Social Connections: Not on file   Intimate Partner Violence: Not on file   Housing Stability: Unknown    Unable to Pay for Housing in the Last Year: Not on file    Number of Places Lived in the Last Year: Not on file    Unstable Housing in the Last Year: No     Family History   Problem Relation Age of Onset    No Known Problems Father     Rheum Arthritis Brother     Other Mother         ulcerative Colitis    Celiac Disease Sister      /70   Wt 243 lb (110.2 kg)   LMP 11/23/2022 (Approximate)   BMI 45.91 kg/m²      ROS:  Constitutional: Negative. HENT: Negative. Eyes: Negative. Respiratory: Negative. Cardiovascular: Negative. Gastrointestinal: Negative. Endocrine: Negative. Genitourinary: Negative. Musculoskeletal: Negative. Allergic/Immunologic: Negative. Neurological: Negative. Hematological: Negative. Psychiatric/Behavioral: Negative. Physical Exam  Vitals reviewed.    Constitutional:       General: She is not in acute distress. Appearance: Normal appearance. She is well-developed. She is not ill-appearing, toxic-appearing or diaphoretic. HENT:      Head: Normocephalic and atraumatic. Eyes:      General: No scleral icterus. Conjunctiva/sclera: Conjunctivae normal.      Pupils: Pupils are equal, round, and reactive to light. Cardiovascular:      Rate and Rhythm: Normal rate and regular rhythm. Heart sounds: Normal heart sounds. No murmur. No friction rub. No gallop. Pulmonary:      Effort: Pulmonary effort is normal. No respiratory distress. Breath sounds: Normal breath sounds. No stridor. No wheezing, rhonchi or rales. Abdominal:      General: There is no distension. Musculoskeletal:         General: Normal range of motion. Cervical back: Normal range of motion and neck supple. Skin:     General: Skin is warm and dry. Coloration: Skin is not jaundiced or pale. Findings: No erythema or rash. Neurological:      General: No focal deficit present. Mental Status: She is alert, oriented to person, place, and time and easily aroused. Motor: No abnormal muscle tone. Coordination: Coordination normal.   Psychiatric:         Mood and Affect: Mood normal.         Speech: Speech normal.         Behavior: Behavior normal. Behavior is cooperative. Thought Content: Thought content normal.         Judgment: Judgment normal.       +++FHTs (170s via bedside ipad US)        1. AMA (advanced maternal age) multigravida 33+, second trimester    2. Obesity affecting pregnancy in second trimester    3. Screen for STD (sexually transmitted disease)    4.  Previous  section complicating pregnancy      Orders Placed This Encounter   Procedures    Neisseria Gonorrhoeae, KISHOR    Chlamydia Trachomatis, KISHOR     Orders Placed This Encounter   Medications    sertraline (ZOLOFT) 100 MG tablet     Sig: Take 1.5 tablets by mouth daily     Dispense:  60 tablet     Refill:  11     PNL reviewed. AB positive / Antibody neg / Rubella Immune / A1c  4.9 / urine Cx neg / HIV NR / HBsAG / RPR NR / hgb 14.1 / Hct 40.2 / plts 220. GC/CT sent today via urine. Up to date on pap (WNL w/ neg HR HPV 12/7/220, just very recently had annual w/ breast exam  AMA - declined MFM unless issues arise. BMI - has not gained weight since last appt. Pt aware of risks associated. Doing well. Hx of LEEP - did not have short cervix last pregnancy, will check with anatomy US. Recent normal pap. Hx of depression: doing well on zoloft, refills sent today. Denies complaints. Hx of A1DM: A1c 4.9 w/ prenatal labs, thus plan routine 1hr GTT at 26-28wks  Hx of LTCD x 2: Plan repeat at 39 wks  FU in 4wks for DARRON. Return in about 4 weeks (around 3/27/2023), or if symptoms worsen or fail to improve, for DARRON.

## 2023-03-27 ENCOUNTER — ROUTINE PRENATAL (OUTPATIENT)
Dept: OBGYN CLINIC | Age: 38
End: 2023-03-27

## 2023-03-27 VITALS — DIASTOLIC BLOOD PRESSURE: 82 MMHG | SYSTOLIC BLOOD PRESSURE: 124 MMHG | WEIGHT: 242 LBS | BODY MASS INDEX: 45.73 KG/M2

## 2023-03-27 DIAGNOSIS — O99.212 OBESITY AFFECTING PREGNANCY IN SECOND TRIMESTER: ICD-10-CM

## 2023-03-27 DIAGNOSIS — O09.522 AMA (ADVANCED MATERNAL AGE) MULTIGRAVIDA 35+, SECOND TRIMESTER: Primary | ICD-10-CM

## 2023-03-27 DIAGNOSIS — O34.219 PREVIOUS CESAREAN SECTION COMPLICATING PREGNANCY: ICD-10-CM

## 2023-03-27 PROCEDURE — 99902 PR PRENATAL VISIT: CPT | Performed by: OBSTETRICS & GYNECOLOGY

## 2023-03-27 NOTE — PROGRESS NOTES
45yo [id number] @ 17w5d for DARRON:    No OB complaints. No VB. Vitals:    23 0924   BP: 124/82     Weight Metrics 3/27/2023 2023 2023 2022 10/19/2022 10/6/2022 2022   Weight 242 lb 243 lb 241 lb 241 lb 236 lb 235 lb 239 lb 12.8 oz   BMI (Calculated) 0 kg/m2 0 kg/m2 45.6 kg/m2 45.6 kg/m2 44.7 kg/m2 44.5 kg/m2 45.4 kg/m2       +++FHTs      1. AMA (advanced maternal age) multigravida 33+, second trimester    2. Obesity affecting pregnancy in second trimester    3. Previous  section complicating pregnancy        No orders of the defined types were placed in this encounter. No orders of the defined types were placed in this encounter. PNL reviewed. AB positive / Antibody neg / Rubella Immune / A1c  4.9 / urine Cx neg / HIV NR / HBsAG / RPR NR / hgb 14.1 / Hct 40.2 / plts 220. GC/CT sent today via urine. Up to date on pap (WNL w/ neg HR HPV , just very recently had annual w/ breast exam  AMA - declined MFM unless issues arise. BMI - has had minimal if any weight gain. Pt aware of risks associated. Doing well. A1c 4.9 thus will have routine 1hr GTT at 28wks. Hx of LEEP - did not have short cervix last pregnancy, will check with anatomy US. Recent normal pap. Hx of depression: doing well on zoloft, refills sent today. Denies complaints. Hx of A1DM: A1c 4.9 w/ prenatal labs, thus plan routine 1hr GTT at 26-28wks  Hx of LTCD x 2: Plan repeat at 39 wks  FU in 3-4wks for DARRON w/ anatomy US. Return in about 2 weeks (around 4/10/2023), or if symptoms worsen or fail to improve, for Anatomy US & DARRON w/ CBB.

## 2023-04-12 ENCOUNTER — ROUTINE PRENATAL (OUTPATIENT)
Dept: OBGYN CLINIC | Age: 38
End: 2023-04-12
Payer: COMMERCIAL

## 2023-04-12 VITALS — SYSTOLIC BLOOD PRESSURE: 118 MMHG | WEIGHT: 242 LBS | DIASTOLIC BLOOD PRESSURE: 60 MMHG | BODY MASS INDEX: 45.73 KG/M2

## 2023-04-12 DIAGNOSIS — Z36.89 ENCOUNTER FOR FETAL ANATOMIC SURVEY: ICD-10-CM

## 2023-04-12 DIAGNOSIS — O09.522 AMA (ADVANCED MATERNAL AGE) MULTIGRAVIDA 35+, SECOND TRIMESTER: Primary | ICD-10-CM

## 2023-04-12 DIAGNOSIS — Z98.890 HISTORY OF LOOP ELECTRICAL EXCISION PROCEDURE (LEEP): ICD-10-CM

## 2023-04-12 PROCEDURE — 76805 OB US >/= 14 WKS SNGL FETUS: CPT | Performed by: OBSTETRICS & GYNECOLOGY

## 2023-04-12 PROCEDURE — 99902 PR PRENATAL VISIT: CPT | Performed by: OBSTETRICS & GYNECOLOGY

## 2023-04-12 PROCEDURE — 76817 TRANSVAGINAL US OBSTETRIC: CPT | Performed by: OBSTETRICS & GYNECOLOGY

## 2023-04-19 NOTE — PROGRESS NOTES
4/12/23:    53OV [id number] @ 20w0d for DARRON: s/p anatomy US    No OB complaints. No VB. Vitals:    04/12/23 1202   BP: 118/60     Weight Metrics 4/12/2023 3/27/2023 2/27/2023 1/30/2023 12/7/2022 10/19/2022 10/6/2022   Weight 242 lb 242 lb 243 lb 241 lb 241 lb 236 lb 235 lb   BMI (Calculated) 0 kg/m2 0 kg/m2 0 kg/m2 45.6 kg/m2 45.6 kg/m2 44.7 kg/m2 44.5 kg/m2       +++FHTs      1. AMA (advanced maternal age) multigravida 33+, second trimester    2. History of loop electrical excision procedure (LEEP)    3. Encounter for fetal anatomic survey        Orders Placed This Encounter   Procedures    AMB POC US OB >= 14 WKS, 1ST GESTATION    AMB POC US OB TRANSVAGINAL     No orders of the defined types were placed in this encounter. PNL reviewed. AB positive / Antibody neg / Rubella Immune / A1c  4.9 / urine Cx neg / HIV NR / HBsAG / RPR NR / hgb 14.1 / Hct 40.2 / plts 220. GC/CT sent today via urine. Up to date on pap (WNL w/ neg HR HPV 12/7/220, just very recently had annual w/ breast exam  AMA - declined MFM unless issues arise. BMI - has had minimal if any weight gain. Pt aware of risks associated. Doing well. A1c 4.9 thus will have routine 1hr GTT at 28wks. Hx of LEEP - did not have short cervix last pregnancy, will check with anatomy US. Recent normal pap. --> CL 4.4cm (normal)  Hx of depression: doing well on zoloft, refills sent today. Denies complaints. Hx of A1DM: A1c 4.9 w/ prenatal labs, thus plan routine 1hr GTT at 26-28wks  Hx of LTCD x 2: Plan repeat at 39 wks  Anatomy US reviewed. All WNL, though incomplete views were obtained. Placenta noted to have eccentric cord insertion. Will recheck incomplete views and follow growths given placental cord insertion as well. FU in 4wks for DARRON w/ repeat anatomy US. Return in about 4 weeks (around 5/10/2023), or if symptoms worsen or fail to improve, for Repeat ANAUS then CBB.

## 2023-05-10 ENCOUNTER — ROUTINE PRENATAL (OUTPATIENT)
Dept: OBGYN CLINIC | Age: 38
End: 2023-05-10

## 2023-05-10 VITALS — BODY MASS INDEX: 45.91 KG/M2 | DIASTOLIC BLOOD PRESSURE: 78 MMHG | SYSTOLIC BLOOD PRESSURE: 118 MMHG | WEIGHT: 243 LBS

## 2023-05-10 DIAGNOSIS — O09.522 AMA (ADVANCED MATERNAL AGE) MULTIGRAVIDA 35+, SECOND TRIMESTER: ICD-10-CM

## 2023-05-10 DIAGNOSIS — O34.219 PREVIOUS CESAREAN SECTION COMPLICATING PREGNANCY: ICD-10-CM

## 2023-05-10 DIAGNOSIS — Z36.2 ENCOUNTER FOR FOLLOW-UP ULTRASOUND OF FETAL ANATOMY: Primary | ICD-10-CM

## 2023-05-10 DIAGNOSIS — O99.212 OBESITY AFFECTING PREGNANCY IN SECOND TRIMESTER: ICD-10-CM

## 2023-05-15 ENCOUNTER — TELEPHONE (OUTPATIENT)
Dept: OBGYN CLINIC | Age: 38
End: 2023-05-15

## 2023-05-15 ENCOUNTER — NURSE ONLY (OUTPATIENT)
Dept: OBGYN CLINIC | Age: 38
End: 2023-05-15
Payer: COMMERCIAL

## 2023-05-15 DIAGNOSIS — M54.50 ACUTE BILATERAL LOW BACK PAIN WITHOUT SCIATICA: ICD-10-CM

## 2023-05-15 DIAGNOSIS — R39.89 ABNORMAL URINE COLOR: Primary | ICD-10-CM

## 2023-05-15 LAB
AMORPHOUS CRYSTAL: NORMAL
BACTERIA URINE, POC: NORMAL
BILIRUBIN, URINE, POC: NORMAL
BLOOD URINE, POC: NORMAL
CASTS URINE, POC: NORMAL
CRYSTALS UA, POC: NORMAL
EPI CELLS URINE, POC: NORMAL
GLUCOSE URINE, POC: NORMAL
KETONES, URINE, POC: NORMAL
LEUKOCYTE ESTERASE, URINE, POC: NORMAL
NITRITE, URINE, POC: NORMAL
OTHER, URINE: NORMAL
PH, URINE, POC: NORMAL (ref 4.6–8)
PROTEIN,URINE, POC: NORMAL
RBC, URINE, POC: NORMAL
SPECIFIC GRAVITY, URINE, POC: NORMAL (ref 1–1.03)
URINALYSIS CLARITY, POC: NORMAL
URINALYSIS COLOR, POC: NORMAL
UROBILINOGEN, POC: NORMAL
WBC, URINE, POC: NORMAL

## 2023-05-15 PROCEDURE — 81000 URINALYSIS NONAUTO W/SCOPE: CPT | Performed by: NURSE PRACTITIONER

## 2023-05-15 RX ORDER — NITROFURANTOIN 25; 75 MG/1; MG/1
100 CAPSULE ORAL 2 TIMES DAILY
Qty: 28 CAPSULE | Refills: 0 | Status: SHIPPED | OUTPATIENT
Start: 2023-05-15 | End: 2023-05-20

## 2023-05-15 NOTE — TELEPHONE ENCOUNTER
Pt called with c/o \"urine is a weird color. \" Pt declines burning or pain with urination and/or urinary frequency. Pt states she had the stomach bug and was possibly dehydrated at that time but pt has since increased hydration and urine color remains the same. Pt reports mild low back pain. Pt request to come in and leave urine sample for culture. Offered appointment with provider, pt declines at this time. Lab visit scheduled for 5/15/23.

## 2023-05-15 NOTE — PROGRESS NOTES
Pt presents for urine culture r/t abnormal urine color and low back pain. Urine dip- trace blood, all else WNL. Reviewed with Umang Cortez NP, send in Macrobid BID x 5 days. Pt notified of results of urine dip and need to start rx. Pt encouraged to increase hydration and take Tylenol PRN. Pt agree's and voiced understanding.

## 2023-05-17 LAB
BACTERIA SPEC CULT: NORMAL
BACTERIA SPEC CULT: NORMAL
SERVICE CMNT-IMP: NORMAL

## 2023-06-08 ENCOUNTER — ROUTINE PRENATAL (OUTPATIENT)
Dept: OBGYN CLINIC | Age: 38
End: 2023-06-08

## 2023-06-08 VITALS — DIASTOLIC BLOOD PRESSURE: 79 MMHG | BODY MASS INDEX: 46.48 KG/M2 | SYSTOLIC BLOOD PRESSURE: 118 MMHG | WEIGHT: 246 LBS

## 2023-06-08 DIAGNOSIS — Z13.1 SCREENING FOR DIABETES MELLITUS: ICD-10-CM

## 2023-06-08 DIAGNOSIS — O99.213 OBESITY AFFECTING PREGNANCY IN THIRD TRIMESTER: ICD-10-CM

## 2023-06-08 DIAGNOSIS — O09.523 AMA (ADVANCED MATERNAL AGE) MULTIGRAVIDA 35+, THIRD TRIMESTER: Primary | ICD-10-CM

## 2023-06-08 DIAGNOSIS — Z13.0 SCREENING, ANEMIA, DEFICIENCY, IRON: ICD-10-CM

## 2023-06-08 LAB
BASOPHILS # BLD: 0 K/UL (ref 0–0.2)
BASOPHILS NFR BLD: 0 % (ref 0–2)
DIFFERENTIAL METHOD BLD: ABNORMAL
EOSINOPHIL # BLD: 0.1 K/UL (ref 0–0.8)
EOSINOPHIL NFR BLD: 1 % (ref 0.5–7.8)
ERYTHROCYTE [DISTWIDTH] IN BLOOD BY AUTOMATED COUNT: 14.3 % (ref 11.9–14.6)
GLUCOSE 1 HOUR: 123 MG/DL
HCT VFR BLD AUTO: 34.4 % (ref 35.8–46.3)
HGB BLD-MCNC: 11.5 G/DL (ref 11.7–15.4)
IMM GRANULOCYTES # BLD AUTO: 0 K/UL (ref 0–0.5)
IMM GRANULOCYTES NFR BLD AUTO: 1 % (ref 0–5)
LYMPHOCYTES # BLD: 1.2 K/UL (ref 0.5–4.6)
LYMPHOCYTES NFR BLD: 14 % (ref 13–44)
MCH RBC QN AUTO: 30.5 PG (ref 26.1–32.9)
MCHC RBC AUTO-ENTMCNC: 33.4 G/DL (ref 31.4–35)
MCV RBC AUTO: 91.2 FL (ref 82–102)
MONOCYTES # BLD: 0.4 K/UL (ref 0.1–1.3)
MONOCYTES NFR BLD: 5 % (ref 4–12)
NEUTS SEG # BLD: 7 K/UL (ref 1.7–8.2)
NEUTS SEG NFR BLD: 79 % (ref 43–78)
NRBC # BLD: 0 K/UL (ref 0–0.2)
PLATELET # BLD AUTO: 174 K/UL (ref 150–450)
PMV BLD AUTO: 10.5 FL (ref 9.4–12.3)
RBC # BLD AUTO: 3.77 M/UL (ref 4.05–5.2)
WBC # BLD AUTO: 8.7 K/UL (ref 4.3–11.1)

## 2023-06-08 PROCEDURE — 99902 PR PRENATAL VISIT: CPT | Performed by: OBSTETRICS & GYNECOLOGY

## 2023-06-14 PROBLEM — O34.219 PREVIOUS CESAREAN SECTION COMPLICATING PREGNANCY: Status: ACTIVE | Noted: 2021-01-22

## 2023-06-14 PROBLEM — Z86.018 HISTORY OF DYSPLASTIC NEVUS: Status: ACTIVE | Noted: 2019-08-01

## 2023-06-14 PROBLEM — O99.340 ANXIETY DURING PREGNANCY: Status: ACTIVE | Noted: 2023-06-14

## 2023-06-14 PROBLEM — F41.9 ANXIETY DURING PREGNANCY: Status: ACTIVE | Noted: 2023-06-14

## 2023-06-14 PROBLEM — F32.A DEPRESSION AFFECTING PREGNANCY: Status: ACTIVE | Noted: 2023-06-14

## 2023-06-14 PROBLEM — Z98.891 H/O CESAREAN SECTION: Status: RESOLVED | Noted: 2021-03-01 | Resolved: 2023-06-14

## 2023-06-14 PROBLEM — O09.523 HIGH RISK PREGNANCY, MULTIGRAVIDA OF ADVANCED MATERNAL AGE IN THIRD TRIMESTER: Status: ACTIVE | Noted: 2023-02-27

## 2023-06-20 NOTE — PROGRESS NOTES
This is a 40 y.o.   at 30w0d for routine OB visit. Her Estimated Due Date is 2023, by Last Menstrual Period    Denies leaking of fluid, vaginal bleeding, or regular contractions. Reports fetal movement. Denies HA, blurred vision or RUQ pain.      Taking Prenatal Vitamins: Yes     FHTs: 130s      Current Outpatient Medications on File Prior to Visit   Medication Sig Dispense Refill    sertraline (ZOLOFT) 100 MG tablet Take 1.5 tablets by mouth daily 60 tablet 11    Prenatal Vit-Fe Fumarate-FA (PRENATAL PO) Take by mouth       Current Facility-Administered Medications on File Prior to Visit   Medication Dose Route Frequency Provider Last Rate Last Admin    sodium chloride nebulizer 0.9 % solution 3 mL  3 mL Nebulization 4x Daily PRN JAMES Winchester NP           No Known Allergies    OB History    Para Term  AB Living   3 2 2 0 0 2   SAB IAB Ectopic Molar Multiple Live Births   0 0 0 0 0 2       # 1 - Date: 18, Sex: Female, Weight: 6 lb 15.5 oz (3.16 kg), GA: 39w0d, Delivery: , Low Transverse, Apgar1: 7, Apgar5: 9, Living: Living, Birth Comments: None    # 2 - Date: 21, Sex: Male, Weight: 8 lb 5.7 oz (3.79 kg), GA: 39w2d, Delivery: , Low Transverse, Apgar1: 8, Apgar5: 9, Living: Living, Birth Comments: None    # 3 - Date: None, Sex: None, Weight: None, GA: None, Delivery: None, Apgar1: None, Apgar5: None, Living: None, Birth Comments: None        Past Medical History:   Diagnosis Date    Abnormal Papanicolaou smear of cervix     Anxiety     Atypical mole 2019    Depression     Elevated prolactin level     Gastric ulcer     GERD (gastroesophageal reflux disease)     H/O seasonal allergies     History of dysplastic nevus 2019    from mid/upper back path report dysplastic completely excised - see scanned documents     Infertility, female     anovulation, male factor    LGSIL (low grade squamous intraepithelial dysplasia)     Morbid

## 2023-06-21 ENCOUNTER — ROUTINE PRENATAL (OUTPATIENT)
Dept: OBGYN CLINIC | Age: 38
End: 2023-06-21

## 2023-06-21 VITALS — WEIGHT: 249 LBS | BODY MASS INDEX: 47.05 KG/M2 | DIASTOLIC BLOOD PRESSURE: 58 MMHG | SYSTOLIC BLOOD PRESSURE: 100 MMHG

## 2023-06-21 DIAGNOSIS — F32.A DEPRESSION AFFECTING PREGNANCY: ICD-10-CM

## 2023-06-21 DIAGNOSIS — O09.523 HIGH RISK PREGNANCY, MULTIGRAVIDA OF ADVANCED MATERNAL AGE IN THIRD TRIMESTER: Primary | ICD-10-CM

## 2023-06-21 DIAGNOSIS — O99.340 DEPRESSION AFFECTING PREGNANCY: ICD-10-CM

## 2023-06-21 DIAGNOSIS — Z3A.30 30 WEEKS GESTATION OF PREGNANCY: ICD-10-CM

## 2023-06-21 DIAGNOSIS — O12.13 PROTEINURIA AFFECTING PREGNANCY IN THIRD TRIMESTER: ICD-10-CM

## 2023-06-21 NOTE — PATIENT INSTRUCTIONS
PTL/labor precautions, [address], and pregnancy warning signs reviewed. Pt advised to call the office at [phone] or go straight to Labor and Delivery at [facility] with any of the following concerns vaginal bleeding, leaking of fluid, karan regularly Q 5-7 minutes for over an hour or not feeling the baby move.      Kick counts and pre-term labor precautions reviewed

## 2023-06-22 LAB
ALBUMIN SERPL-MCNC: 2.7 G/DL (ref 3.5–5)
ALBUMIN/GLOB SERPL: 0.8 (ref 0.4–1.6)
ALP SERPL-CCNC: 93 U/L (ref 50–136)
ALT SERPL-CCNC: 14 U/L (ref 12–65)
ANION GAP SERPL CALC-SCNC: 9 MMOL/L (ref 2–11)
AST SERPL-CCNC: 10 U/L (ref 15–37)
BILIRUB SERPL-MCNC: 0.1 MG/DL (ref 0.2–1.1)
BUN SERPL-MCNC: 9 MG/DL (ref 6–23)
CALCIUM SERPL-MCNC: 9.2 MG/DL (ref 8.3–10.4)
CHLORIDE SERPL-SCNC: 107 MMOL/L (ref 101–110)
CO2 SERPL-SCNC: 22 MMOL/L (ref 21–32)
CREAT SERPL-MCNC: 0.5 MG/DL (ref 0.6–1)
CREAT UR-MCNC: 20 MG/DL
ERYTHROCYTE [DISTWIDTH] IN BLOOD BY AUTOMATED COUNT: 14.2 % (ref 11.9–14.6)
GLOBULIN SER CALC-MCNC: 3.2 G/DL (ref 2.8–4.5)
GLUCOSE SERPL-MCNC: 104 MG/DL (ref 65–100)
HCT VFR BLD AUTO: 34.3 % (ref 35.8–46.3)
HGB BLD-MCNC: 11.5 G/DL (ref 11.7–15.4)
LDH SERPL L TO P-CCNC: 123 U/L (ref 100–190)
MCH RBC QN AUTO: 30.2 PG (ref 26.1–32.9)
MCHC RBC AUTO-ENTMCNC: 33.5 G/DL (ref 31.4–35)
MCV RBC AUTO: 90 FL (ref 82–102)
NRBC # BLD: 0 K/UL (ref 0–0.2)
PLATELET # BLD AUTO: 183 K/UL (ref 150–450)
PMV BLD AUTO: 11 FL (ref 9.4–12.3)
POTASSIUM SERPL-SCNC: 3.5 MMOL/L (ref 3.5–5.1)
PROT SERPL-MCNC: 5.9 G/DL (ref 6.3–8.2)
PROT UR-MCNC: <5 MG/DL
PROT/CREAT UR-RTO: NORMAL
RBC # BLD AUTO: 3.81 M/UL (ref 4.05–5.2)
SODIUM SERPL-SCNC: 138 MMOL/L (ref 133–143)
URATE SERPL-MCNC: 3.6 MG/DL (ref 2.6–6)
WBC # BLD AUTO: 8.9 K/UL (ref 4.3–11.1)

## 2023-07-10 ENCOUNTER — ROUTINE PRENATAL (OUTPATIENT)
Dept: OBGYN CLINIC | Age: 38
End: 2023-07-10
Payer: COMMERCIAL

## 2023-07-10 VITALS — BODY MASS INDEX: 47.05 KG/M2 | DIASTOLIC BLOOD PRESSURE: 76 MMHG | WEIGHT: 249 LBS | SYSTOLIC BLOOD PRESSURE: 124 MMHG

## 2023-07-10 DIAGNOSIS — O12.13 PROTEINURIA AFFECTING PREGNANCY IN THIRD TRIMESTER: ICD-10-CM

## 2023-07-10 DIAGNOSIS — Z98.891 H/O CESAREAN SECTION: ICD-10-CM

## 2023-07-10 DIAGNOSIS — O09.523 HIGH RISK PREGNANCY, MULTIGRAVIDA OF ADVANCED MATERNAL AGE IN THIRD TRIMESTER: Primary | ICD-10-CM

## 2023-07-10 PROCEDURE — 76816 OB US FOLLOW-UP PER FETUS: CPT | Performed by: OBSTETRICS & GYNECOLOGY

## 2023-07-10 PROCEDURE — 99902 PR PRENATAL VISIT: CPT | Performed by: OBSTETRICS & GYNECOLOGY

## 2023-07-28 ENCOUNTER — ROUTINE PRENATAL (OUTPATIENT)
Dept: OBGYN CLINIC | Age: 38
End: 2023-07-28

## 2023-07-28 ENCOUNTER — TELEPHONE (OUTPATIENT)
Dept: OBGYN CLINIC | Age: 38
End: 2023-07-28

## 2023-07-28 VITALS — BODY MASS INDEX: 47.61 KG/M2 | WEIGHT: 252 LBS | DIASTOLIC BLOOD PRESSURE: 70 MMHG | SYSTOLIC BLOOD PRESSURE: 118 MMHG

## 2023-07-28 DIAGNOSIS — O09.523 AMA (ADVANCED MATERNAL AGE) MULTIGRAVIDA 35+, THIRD TRIMESTER: ICD-10-CM

## 2023-07-28 DIAGNOSIS — O09.523 HIGH RISK PREGNANCY, MULTIGRAVIDA OF ADVANCED MATERNAL AGE IN THIRD TRIMESTER: ICD-10-CM

## 2023-07-28 DIAGNOSIS — O34.219 PREVIOUS CESAREAN SECTION COMPLICATING PREGNANCY: Primary | ICD-10-CM

## 2023-07-28 PROCEDURE — 99902 PR PRENATAL VISIT: CPT | Performed by: OBSTETRICS & GYNECOLOGY

## 2023-07-28 NOTE — PROGRESS NOTES
Chief Complaint   Patient presents with    Routine Prenatal Visit     94LS [id number] @ 35w2d for DARRON:     No LOF. No VB. +FM. No sx of preE, no ctx. Vitals:    23 0940   BP: 118/70     Weight Metrics 2023 2023 7/10/2023 2023 2023 5/10/2023 2023   Weight 254 lb 252 lb 249 lb 249 lb 246 lb 243 lb 242 lb   BMI (Calculated) 0 kg/m2 0 kg/m2 0 kg/m2 0 kg/m2 0 kg/m2 0 kg/m2 0 kg/m2     +++FHTs     1. Previous  section complicating pregnancy    2. AMA (advanced maternal age) multigravida 33+, third trimester    3. High risk pregnancy, multigravida of advanced maternal age in third trimester        No orders of the defined types were placed in this encounter. No orders of the defined types were placed in this encounter. PNL reviewed. AB positive / Antibody neg / Rubella Immune / A1c  4.9 / urine Cx neg / HIV NR / HBsAG / RPR NR / hgb 14.1 / Hct 40.2 / plts 220. GC/CT sent today via urine. Up to date on pap (WNL w/ neg HR HPV , just very recently had annual w/ breast exam  AMA - declined MFM unless issues arise --> was referred to MFM due to persistently incomplete anatomy views. Reviewed MFM notes and growth was normal 23 (Overall 73%ile, AC 77%ile, ALFREDO 22). BMI - has had minimal if any weight gain. Pt aware of risks associated. Doing well. Passed 1hr GTT. Hx of LEEP - did not have short cervix last pregnancy, will check with anatomy US. Recent normal pap. --> CL 4.4cm (normal)  Hx of depression: doing well on zoloft, refills sent today. Denies complaints. Hx of A1DM: A1c 4.9 w/ prenatal labs, Hx of LTCD x 2: Plan repeat at 39 wks  Will schedule RLTCD on 23 @ 0730 w/ Dr. Chioma Adhikari (form given to [facility], RN today). FU in 1wks for DARRON w/ wkly BPPs if able to schedule (maternal BMI)    Return in about 1 week (around 2023), or if symptoms worsen or fail to improve, for DARRON w/ BPP (maternal BMI).

## 2023-07-28 NOTE — TELEPHONE ENCOUNTER
[facility] L&D, Repeat  scheduled for 23 at 1230 with . Pt instructions sent via [employer]. IOL form faxed to L&D.

## 2023-08-04 ENCOUNTER — ROUTINE PRENATAL (OUTPATIENT)
Dept: OBGYN CLINIC | Age: 38
End: 2023-08-04
Payer: COMMERCIAL

## 2023-08-04 VITALS — SYSTOLIC BLOOD PRESSURE: 120 MMHG | DIASTOLIC BLOOD PRESSURE: 80 MMHG | BODY MASS INDEX: 47.99 KG/M2 | WEIGHT: 254 LBS

## 2023-08-04 DIAGNOSIS — O09.523 HIGH RISK PREGNANCY, MULTIGRAVIDA OF ADVANCED MATERNAL AGE IN THIRD TRIMESTER: ICD-10-CM

## 2023-08-04 DIAGNOSIS — O12.13 PROTEINURIA AFFECTING PREGNANCY IN THIRD TRIMESTER: ICD-10-CM

## 2023-08-04 DIAGNOSIS — O09.523 AMA (ADVANCED MATERNAL AGE) MULTIGRAVIDA 35+, THIRD TRIMESTER: ICD-10-CM

## 2023-08-04 DIAGNOSIS — Z36.85 ANTENATAL SCREENING FOR STREPTOCOCCUS B: Primary | ICD-10-CM

## 2023-08-04 PROCEDURE — 76819 FETAL BIOPHYS PROFIL W/O NST: CPT | Performed by: OBSTETRICS & GYNECOLOGY

## 2023-08-04 PROCEDURE — 99902 PR PRENATAL VISIT: CPT | Performed by: OBSTETRICS & GYNECOLOGY

## 2023-08-04 RX ORDER — SERTRALINE HYDROCHLORIDE 100 MG/1
150 TABLET, FILM COATED ORAL DAILY
Qty: 60 TABLET | Refills: 11 | Status: SHIPPED | OUTPATIENT
Start: 2023-08-04

## 2023-08-04 NOTE — PROGRESS NOTES
Chief Complaint   Patient presents with    Pregnancy Ultrasound     BPP    Routine Prenatal Visit     58QP [id number] @ 36w2d for DARRON:     No LOF. No VB. +FM. No sx of preE, no ctx. Vitals:    23 1117   BP: 120/80     Weight Metrics 2023 2023 7/10/2023 2023 2023 5/10/2023 2023   Weight 254 lb 252 lb 249 lb 249 lb 246 lb 243 lb 242 lb   BMI (Calculated) 0 kg/m2 0 kg/m2 0 kg/m2 0 kg/m2 0 kg/m2 0 kg/m2 0 kg/m2     BPP        1.  screening for streptococcus B    2. High risk pregnancy, multigravida of advanced maternal age in third trimester    3. Proteinuria affecting pregnancy in third trimester    4. AMA (advanced maternal age) multigravida 33+, third trimester        Orders Placed This Encounter   Procedures    Culture, Strep B Screen, Vaginal/Rectal    AMB POC US FETAL BIOPHYSICAL PROFILE W/O NON STRESS TESTING     Orders Placed This Encounter   Medications    sertraline (ZOLOFT) 100 MG tablet     Sig: Take 1.5 tablets by mouth daily     Dispense:  60 tablet     Refill:  11     PNL reviewed. AB positive / Antibody neg / Rubella Immune / A1c  4.9 / urine Cx neg / HIV NR / HBsAG / RPR NR / hgb 14.1 / Hct 40.2 / plts 220. GC/CT sent today via urine. Up to date on pap (WNL w/ neg HR HPV , just very recently had annual w/ breast exam  AMA - declined MFM unless issues arise --> was referred to MFM due to persistently incomplete anatomy views. Reviewed MFM notes and growth was normal 23 (Overall 73%ile, AC 77%ile, ALFREDO 22). BMI - has had minimal if any weight gain. Pt aware of risks associated. Doing well. Passed 1hr GTT. Hx of LEEP - did not have short cervix last pregnancy, will check with anatomy US. Recent normal pap. --> CL 4.4cm (normal)  Hx of depression: doing well on zoloft, refills sent today with higher dose. Denies complaints. Hx of A1DM: A1c 4.9 w/ prenatal labs, Hx of LTCD x 2: Plan repeat at 39 wks  GBS collected.   Scheduled RLTCD on 23 @

## 2023-08-10 LAB
BACTERIA SPEC CULT: NORMAL
SERVICE CMNT-IMP: NORMAL

## 2023-08-15 ENCOUNTER — ROUTINE PRENATAL (OUTPATIENT)
Dept: OBGYN CLINIC | Age: 38
End: 2023-08-15

## 2023-08-15 VITALS — WEIGHT: 259 LBS | DIASTOLIC BLOOD PRESSURE: 80 MMHG | SYSTOLIC BLOOD PRESSURE: 122 MMHG | BODY MASS INDEX: 48.94 KG/M2

## 2023-08-15 DIAGNOSIS — O09.523 HIGH RISK PREGNANCY, MULTIGRAVIDA OF ADVANCED MATERNAL AGE IN THIRD TRIMESTER: Primary | ICD-10-CM

## 2023-08-15 PROCEDURE — 99902 PR PRENATAL VISIT: CPT | Performed by: OBSTETRICS & GYNECOLOGY

## 2023-08-15 NOTE — PROGRESS NOTES
Chief Complaint   Patient presents with    Routine Prenatal Visit     72QL [id number] @ 37w6d for DARRON:     No LOF. No VB. +FM. No sx of preE, no ctx. Vitals:    08/15/23 1036   BP: 122/80     Weight Metrics 8/15/2023 8/4/2023 7/28/2023 7/10/2023 6/21/2023 6/8/2023 5/10/2023   Weight 259 lb 254 lb 252 lb 249 lb 249 lb 246 lb 243 lb   BMI (Calculated) 0 kg/m2 0 kg/m2 0 kg/m2 0 kg/m2 0 kg/m2 0 kg/m2 0 kg/m2     +++FHTs       1. High risk pregnancy, multigravida of advanced maternal age in third trimester        No orders of the defined types were placed in this encounter. No orders of the defined types were placed in this encounter. PNL reviewed. AB positive / Antibody neg / Rubella Immune / A1c  4.9 / urine Cx neg / HIV NR / HBsAG / RPR NR / hgb 14.1 / Hct 40.2 / plts 220. GC/CT sent today via urine. Up to date on pap (WNL w/ neg HR HPV 12/7/220, just very recently had annual w/ breast exam  AMA - declined MFM unless issues arise --> was referred to MFM due to persistently incomplete anatomy views. Reviewed MFM notes and growth was normal 6/14/23 (Overall 73%ile, AC 77%ile, ALFREDO 22). BMI - has had minimal if any weight gain. Pt aware of risks associated. Doing well. Passed 1hr GTT. Hx of LEEP - did not have short cervix last pregnancy, will check with anatomy US. Recent normal pap. --> CL 4.4cm (normal)  Hx of depression: doing well on zoloft, refills sent today with higher dose. Denies complaints. Hx of A1DM: A1c 4.9 w/ prenatal labs, Hx of LTCD x 2: Plan repeat at 39 wks  GBS negative. Scheduled RLTCD on 8/23/23 @ 1230 w/ Dr. Lynsey Yao. Pt desires risk reducing bilateral salpingectomy. As patient plans c/s, she is interested in opportunistic salpingectomy due to her increased risk of gyn cancers due to her obesity and history of GDM in the past and is at risk for cancers associated with metabolic disorders. Risk of gyn cancers are increased in women with BMI over 40.  Pt is aware that unfortunately

## 2023-08-23 ENCOUNTER — ANESTHESIA (OUTPATIENT)
Dept: OPERATING ROOM | Age: 38
End: 2023-08-23
Payer: COMMERCIAL

## 2023-08-23 ENCOUNTER — ANESTHESIA EVENT (OUTPATIENT)
Dept: OPERATING ROOM | Age: 38
End: 2023-08-23
Payer: COMMERCIAL

## 2023-08-23 ENCOUNTER — HOSPITAL ENCOUNTER (INPATIENT)
Age: 38
LOS: 2 days | Discharge: HOME | End: 2023-08-25
Attending: OBSTETRICS & GYNECOLOGY | Admitting: OBSTETRICS & GYNECOLOGY
Payer: COMMERCIAL

## 2023-08-23 PROBLEM — O34.219 DELIVERED BY CESAREAN DELIVERY FOLLOWING PREVIOUS CESAREAN DELIVERY: Status: ACTIVE | Noted: 2023-08-23

## 2023-08-23 LAB
ABO + RH BLD: NORMAL
BLOOD GROUP ANTIBODIES SERPL: NORMAL
ERYTHROCYTE [DISTWIDTH] IN BLOOD BY AUTOMATED COUNT: 13.8 % (ref 11.9–14.6)
HCT VFR BLD AUTO: 35.9 % (ref 35.8–46.3)
HGB BLD-MCNC: 12.2 G/DL (ref 11.7–15.4)
MCH RBC QN AUTO: 29.4 PG (ref 26.1–32.9)
MCHC RBC AUTO-ENTMCNC: 34 G/DL (ref 31.4–35)
MCV RBC AUTO: 86.5 FL (ref 82–102)
NRBC # BLD: 0 K/UL (ref 0–0.2)
PLATELET # BLD AUTO: 156 K/UL (ref 150–450)
PMV BLD AUTO: 11.5 FL (ref 9.4–12.3)
RBC # BLD AUTO: 4.15 M/UL (ref 4.05–5.2)
SPECIMEN EXP DATE BLD: NORMAL
WBC # BLD AUTO: 8.1 K/UL (ref 4.3–11.1)

## 2023-08-23 PROCEDURE — 6360000002 HC RX W HCPCS: Performed by: ANESTHESIOLOGY

## 2023-08-23 PROCEDURE — 2720000010 HC SURG SUPPLY STERILE: Performed by: OBSTETRICS & GYNECOLOGY

## 2023-08-23 PROCEDURE — 2709999900 HC NON-CHARGEABLE SUPPLY: Performed by: OBSTETRICS & GYNECOLOGY

## 2023-08-23 PROCEDURE — 88302 TISSUE EXAM BY PATHOLOGIST: CPT

## 2023-08-23 PROCEDURE — 6370000000 HC RX 637 (ALT 250 FOR IP): Performed by: OBSTETRICS & GYNECOLOGY

## 2023-08-23 PROCEDURE — 59510 CESAREAN DELIVERY: CPT | Performed by: OBSTETRICS & GYNECOLOGY

## 2023-08-23 PROCEDURE — 6370000000 HC RX 637 (ALT 250 FOR IP): Performed by: ANESTHESIOLOGY

## 2023-08-23 PROCEDURE — 7100000000 HC PACU RECOVERY - FIRST 15 MIN: Performed by: OBSTETRICS & GYNECOLOGY

## 2023-08-23 PROCEDURE — 86901 BLOOD TYPING SEROLOGIC RH(D): CPT

## 2023-08-23 PROCEDURE — 3609079900 HC CESAREAN SECTION: Performed by: OBSTETRICS & GYNECOLOGY

## 2023-08-23 PROCEDURE — 86850 RBC ANTIBODY SCREEN: CPT

## 2023-08-23 PROCEDURE — 2580000003 HC RX 258: Performed by: NURSE ANESTHETIST, CERTIFIED REGISTERED

## 2023-08-23 PROCEDURE — 88305 TISSUE EXAM BY PATHOLOGIST: CPT

## 2023-08-23 PROCEDURE — 2580000003 HC RX 258: Performed by: OBSTETRICS & GYNECOLOGY

## 2023-08-23 PROCEDURE — 85027 COMPLETE CBC AUTOMATED: CPT

## 2023-08-23 PROCEDURE — 6360000002 HC RX W HCPCS: Performed by: OBSTETRICS & GYNECOLOGY

## 2023-08-23 PROCEDURE — 2500000003 HC RX 250 WO HCPCS: Performed by: NURSE ANESTHETIST, CERTIFIED REGISTERED

## 2023-08-23 PROCEDURE — 58700 REMOVAL OF FALLOPIAN TUBE: CPT | Performed by: OBSTETRICS & GYNECOLOGY

## 2023-08-23 PROCEDURE — 7100000001 HC PACU RECOVERY - ADDTL 15 MIN: Performed by: OBSTETRICS & GYNECOLOGY

## 2023-08-23 PROCEDURE — 99465 NB RESUSCITATION: CPT

## 2023-08-23 PROCEDURE — 3700000000 HC ANESTHESIA ATTENDED CARE: Performed by: OBSTETRICS & GYNECOLOGY

## 2023-08-23 PROCEDURE — 6360000002 HC RX W HCPCS: Performed by: NURSE ANESTHETIST, CERTIFIED REGISTERED

## 2023-08-23 PROCEDURE — 86900 BLOOD TYPING SEROLOGIC ABO: CPT

## 2023-08-23 PROCEDURE — 1100000000 HC RM PRIVATE

## 2023-08-23 PROCEDURE — 0UB70ZZ EXCISION OF BILATERAL FALLOPIAN TUBES, OPEN APPROACH: ICD-10-PCS | Performed by: OBSTETRICS & GYNECOLOGY

## 2023-08-23 PROCEDURE — 3700000001 HC ADD 15 MINUTES (ANESTHESIA): Performed by: OBSTETRICS & GYNECOLOGY

## 2023-08-23 RX ORDER — KETOROLAC TROMETHAMINE 30 MG/ML
30 INJECTION, SOLUTION INTRAMUSCULAR; INTRAVENOUS EVERY 6 HOURS
Status: DISCONTINUED | OUTPATIENT
Start: 2023-08-23 | End: 2023-08-24

## 2023-08-23 RX ORDER — ONDANSETRON 2 MG/ML
4 INJECTION INTRAMUSCULAR; INTRAVENOUS ONCE
Status: COMPLETED | OUTPATIENT
Start: 2023-08-23 | End: 2023-08-23

## 2023-08-23 RX ORDER — SODIUM CHLORIDE 0.9 % (FLUSH) 0.9 %
5-40 SYRINGE (ML) INJECTION PRN
Status: DISCONTINUED | OUTPATIENT
Start: 2023-08-23 | End: 2023-08-24 | Stop reason: SDUPTHER

## 2023-08-23 RX ORDER — GLYCOPYRROLATE 0.2 MG/ML
INJECTION INTRAMUSCULAR; INTRAVENOUS PRN
Status: DISCONTINUED | OUTPATIENT
Start: 2023-08-23 | End: 2023-08-23 | Stop reason: SDUPTHER

## 2023-08-23 RX ORDER — BUPIVACAINE HYDROCHLORIDE 7.5 MG/ML
INJECTION, SOLUTION INTRASPINAL
Status: COMPLETED | OUTPATIENT
Start: 2023-08-23 | End: 2023-08-23

## 2023-08-23 RX ORDER — HYDROMORPHONE HYDROCHLORIDE 1 MG/ML
0.5 INJECTION, SOLUTION INTRAMUSCULAR; INTRAVENOUS; SUBCUTANEOUS EVERY 6 HOURS PRN
Status: DISCONTINUED | OUTPATIENT
Start: 2023-08-23 | End: 2023-08-24 | Stop reason: ALTCHOICE

## 2023-08-23 RX ORDER — SODIUM CHLORIDE, SODIUM LACTATE, POTASSIUM CHLORIDE, CALCIUM CHLORIDE 600; 310; 30; 20 MG/100ML; MG/100ML; MG/100ML; MG/100ML
INJECTION, SOLUTION INTRAVENOUS CONTINUOUS PRN
Status: DISCONTINUED | OUTPATIENT
Start: 2023-08-23 | End: 2023-08-23 | Stop reason: SDUPTHER

## 2023-08-23 RX ORDER — DIPHENHYDRAMINE HYDROCHLORIDE 50 MG/ML
12.5 INJECTION INTRAMUSCULAR; INTRAVENOUS EVERY 6 HOURS PRN
Status: DISCONTINUED | OUTPATIENT
Start: 2023-08-23 | End: 2023-08-24 | Stop reason: SDUPTHER

## 2023-08-23 RX ORDER — EPHEDRINE SULFATE/0.9% NACL/PF 50 MG/5 ML
SYRINGE (ML) INTRAVENOUS PRN
Status: DISCONTINUED | OUTPATIENT
Start: 2023-08-23 | End: 2023-08-23 | Stop reason: SDUPTHER

## 2023-08-23 RX ORDER — OXYCODONE HYDROCHLORIDE 5 MG/1
5 TABLET ORAL EVERY 4 HOURS PRN
Status: DISCONTINUED | OUTPATIENT
Start: 2023-08-23 | End: 2023-08-24 | Stop reason: SDUPTHER

## 2023-08-23 RX ORDER — METRONIDAZOLE 500 MG/1
500 TABLET ORAL EVERY 8 HOURS SCHEDULED
Status: DISCONTINUED | OUTPATIENT
Start: 2023-08-23 | End: 2023-08-25 | Stop reason: HOSPADM

## 2023-08-23 RX ORDER — FENTANYL CITRATE 50 UG/ML
INJECTION, SOLUTION INTRAMUSCULAR; INTRAVENOUS
Status: COMPLETED | OUTPATIENT
Start: 2023-08-23 | End: 2023-08-23

## 2023-08-23 RX ORDER — ONDANSETRON 2 MG/ML
4 INJECTION INTRAMUSCULAR; INTRAVENOUS EVERY 6 HOURS PRN
Status: DISCONTINUED | OUTPATIENT
Start: 2023-08-23 | End: 2023-08-24 | Stop reason: SDUPTHER

## 2023-08-23 RX ORDER — SODIUM CHLORIDE, SODIUM LACTATE, POTASSIUM CHLORIDE, AND CALCIUM CHLORIDE .6; .31; .03; .02 G/100ML; G/100ML; G/100ML; G/100ML
1000 INJECTION, SOLUTION INTRAVENOUS ONCE
Status: COMPLETED | OUTPATIENT
Start: 2023-08-23 | End: 2023-08-23

## 2023-08-23 RX ORDER — NALOXONE HYDROCHLORIDE 0.4 MG/ML
INJECTION, SOLUTION INTRAMUSCULAR; INTRAVENOUS; SUBCUTANEOUS PRN
Status: DISCONTINUED | OUTPATIENT
Start: 2023-08-23 | End: 2023-08-24 | Stop reason: SDUPTHER

## 2023-08-23 RX ORDER — CEPHALEXIN 500 MG/1
500 CAPSULE ORAL EVERY 8 HOURS SCHEDULED
Status: DISCONTINUED | OUTPATIENT
Start: 2023-08-23 | End: 2023-08-25 | Stop reason: HOSPADM

## 2023-08-23 RX ORDER — ACETAMINOPHEN 500 MG
1000 TABLET ORAL EVERY 6 HOURS
Status: COMPLETED | OUTPATIENT
Start: 2023-08-23 | End: 2023-08-24

## 2023-08-23 RX ORDER — CITRIC ACID/SODIUM CITRATE 334-500MG
30 SOLUTION, ORAL ORAL ONCE
Status: COMPLETED | OUTPATIENT
Start: 2023-08-23 | End: 2023-08-23

## 2023-08-23 RX ORDER — SODIUM CHLORIDE 9 MG/ML
INJECTION, SOLUTION INTRAVENOUS PRN
Status: DISCONTINUED | OUTPATIENT
Start: 2023-08-23 | End: 2023-08-24 | Stop reason: SDUPTHER

## 2023-08-23 RX ORDER — MORPHINE SULFATE 0.5 MG/ML
INJECTION, SOLUTION EPIDURAL; INTRATHECAL; INTRAVENOUS
Status: COMPLETED | OUTPATIENT
Start: 2023-08-23 | End: 2023-08-23

## 2023-08-23 RX ORDER — KETOROLAC TROMETHAMINE 30 MG/ML
INJECTION, SOLUTION INTRAMUSCULAR; INTRAVENOUS PRN
Status: DISCONTINUED | OUTPATIENT
Start: 2023-08-23 | End: 2023-08-23 | Stop reason: SDUPTHER

## 2023-08-23 RX ORDER — SODIUM CHLORIDE 0.9 % (FLUSH) 0.9 %
5-40 SYRINGE (ML) INJECTION EVERY 12 HOURS SCHEDULED
Status: DISCONTINUED | OUTPATIENT
Start: 2023-08-23 | End: 2023-08-24 | Stop reason: SDUPTHER

## 2023-08-23 RX ORDER — PROCHLORPERAZINE EDISYLATE 5 MG/ML
5 INJECTION INTRAMUSCULAR; INTRAVENOUS EVERY 6 HOURS PRN
Status: DISCONTINUED | OUTPATIENT
Start: 2023-08-23 | End: 2023-08-24 | Stop reason: SDUPTHER

## 2023-08-23 RX ADMIN — Medication 500 ML/HR: at 13:34

## 2023-08-23 RX ADMIN — SODIUM CHLORIDE, SODIUM LACTATE, POTASSIUM CHLORIDE, AND CALCIUM CHLORIDE: 600; 310; 30; 20 INJECTION, SOLUTION INTRAVENOUS at 13:05

## 2023-08-23 RX ADMIN — METRONIDAZOLE 500 MG: 500 TABLET ORAL at 22:00

## 2023-08-23 RX ADMIN — KETOROLAC TROMETHAMINE 30 MG: 30 INJECTION, SOLUTION INTRAMUSCULAR; INTRAVENOUS at 13:58

## 2023-08-23 RX ADMIN — GLYCOPYRROLATE 0.2 MG: 0.2 INJECTION INTRAMUSCULAR; INTRAVENOUS at 13:20

## 2023-08-23 RX ADMIN — KETOROLAC TROMETHAMINE 30 MG: 30 INJECTION, SOLUTION INTRAMUSCULAR; INTRAVENOUS at 20:17

## 2023-08-23 RX ADMIN — CEFAZOLIN 3000 MG: 10 INJECTION, POWDER, FOR SOLUTION INTRAVENOUS at 12:40

## 2023-08-23 RX ADMIN — SODIUM CITRATE AND CITRIC ACID MONOHYDRATE 30 ML: 500; 334 SOLUTION ORAL at 12:40

## 2023-08-23 RX ADMIN — Medication 10 MG: at 13:20

## 2023-08-23 RX ADMIN — PHENYLEPHRINE HYDROCHLORIDE 100 MCG: 0.1 INJECTION, SOLUTION INTRAVENOUS at 13:29

## 2023-08-23 RX ADMIN — ONDANSETRON 4 MG: 2 INJECTION INTRAMUSCULAR; INTRAVENOUS at 12:40

## 2023-08-23 RX ADMIN — CEPHALEXIN 500 MG: 500 CAPSULE ORAL at 22:00

## 2023-08-23 RX ADMIN — FENTANYL CITRATE 20 MCG: 50 INJECTION, SOLUTION INTRAMUSCULAR; INTRAVENOUS at 13:10

## 2023-08-23 RX ADMIN — MORPHINE SULFATE 0.15 MG: 0.5 INJECTION, SOLUTION EPIDURAL; INTRATHECAL; INTRAVENOUS at 13:10

## 2023-08-23 RX ADMIN — PHENYLEPHRINE HYDROCHLORIDE 50 MCG: 0.1 INJECTION, SOLUTION INTRAVENOUS at 13:19

## 2023-08-23 RX ADMIN — ACETAMINOPHEN 1000 MG: 500 TABLET, FILM COATED ORAL at 23:35

## 2023-08-23 RX ADMIN — PHENYLEPHRINE HYDROCHLORIDE 50 MCG: 0.1 INJECTION, SOLUTION INTRAVENOUS at 13:25

## 2023-08-23 RX ADMIN — PHENYLEPHRINE HYDROCHLORIDE 50 MCG: 0.1 INJECTION, SOLUTION INTRAVENOUS at 13:16

## 2023-08-23 RX ADMIN — PHENYLEPHRINE HYDROCHLORIDE 100 MCG: 0.1 INJECTION, SOLUTION INTRAVENOUS at 13:56

## 2023-08-23 RX ADMIN — GLYCOPYRROLATE 0.2 MG: 0.2 INJECTION INTRAMUSCULAR; INTRAVENOUS at 13:22

## 2023-08-23 RX ADMIN — SODIUM CHLORIDE, POTASSIUM CHLORIDE, SODIUM LACTATE AND CALCIUM CHLORIDE 1000 ML: 600; 310; 30; 20 INJECTION, SOLUTION INTRAVENOUS at 18:03

## 2023-08-23 RX ADMIN — BUPIVACAINE HYDROCHLORIDE IN DEXTROSE 12 MG: 7.5 INJECTION, SOLUTION SUBARACHNOID at 13:10

## 2023-08-23 RX ADMIN — ACETAMINOPHEN 1000 MG: 500 TABLET, FILM COATED ORAL at 17:23

## 2023-08-23 NOTE — PROGRESS NOTES
Admitted for scheduled  section and RROS (Ethics made aware). Admission assessment as documented. Oriented to room, call system and pain scale. Plan of care reviewed and questions answered. Consents signed, identification band verified and placed. IV placed, blood work drawn per order and sent to lab.

## 2023-08-23 NOTE — H&P
History & Physical    Name: Hi Davey MRN: [id number]  SSN: [id number]    YOB: 1985  Age: 41 y.o. Sex: female      Subjective:     Estimated Date of Delivery: 23  OB History    Para Term  AB Living   3 2 2 0 0 2   SAB IAB Ectopic Molar Multiple Live Births             2      # Outcome Date GA Lbr Ryan/2nd Weight Sex Delivery Anes PTL Lv   3 Current            2 Term 21 39w2d  8 lb 5.7 oz (3.79 kg) M CS-LTranv Spinal N ILVIER   1 Term 18 39w0d  6 lb 15.5 oz (3.16 kg) F CS-LTranv  N LIVIER       Ms. Carly Carter admitted with pregnancy at 39w0d for  section with risk reducing bilateral salpingectomy due to previous  section and increased risk of gyn cancers due to BMI. Prenatal course was normal. Please see prenatal records for details.     Past Medical History:   Diagnosis Date    Abnormal Papanicolaou smear of cervix     Anxiety     Atypical mole 2019    Depression     Elevated prolactin level     Gastric ulcer     GERD (gastroesophageal reflux disease)     H/O seasonal allergies     History of dysplastic nevus 2019    from mid/upper back path report dysplastic completely excised - see scanned documents     Infertility, female     anovulation, male factor    LGSIL (low grade squamous intraepithelial dysplasia)     Morbid obesity ([address]) 2019    Varicose vein of leg     RLE     Past Surgical History:   Procedure Laterality Date     SECTION  2018    DILATION AND CURETTAGE OF UTERUS  17    Hysteroscopy/polypectomy    GYN       IUI's    HYSTEROSCOPY      uterine polyps    LEEP      PELVIC LAPAROSCOPY  16    Laser     Social History     Occupational History    Not on file   Tobacco Use    Smoking status: Former     Types: E-Cigarettes    Smokeless tobacco: Never    Tobacco comments:     Quit smoking: former some day smoker for about 2 yrs in her 20's   Vaping Use    Vaping Use: Never used   Substance and Sexual Activity    Alcohol use:

## 2023-08-23 NOTE — ANESTHESIA PRE PROCEDURE
Component Value Date/Time    WBC 8.1 08/23/2023 11:01 AM    RBC 4.15 08/23/2023 11:01 AM    HGB 12.2 08/23/2023 11:01 AM    HCT 35.9 08/23/2023 11:01 AM    MCV 86.5 08/23/2023 11:01 AM    RDW 13.8 08/23/2023 11:01 AM     08/23/2023 11:01 AM       CMP:   Lab Results   Component Value Date/Time     06/21/2023 03:15 PM    K 3.5 06/21/2023 03:15 PM     06/21/2023 03:15 PM    CO2 22 06/21/2023 03:15 PM    BUN 9 06/21/2023 03:15 PM    CREATININE 0.50 06/21/2023 03:15 PM    LABGLOM >60 06/21/2023 03:15 PM    GLUCOSE 104 06/21/2023 03:15 PM    PROT 5.9 06/21/2023 03:15 PM    CALCIUM 9.2 06/21/2023 03:15 PM    BILITOT 0.1 06/21/2023 03:15 PM    ALKPHOS 93 06/21/2023 03:15 PM    AST 10 06/21/2023 03:15 PM    ALT 14 06/21/2023 03:15 PM       POC Tests: No results for input(s): POCGLU, POCNA, POCK, POCCL, POCBUN, POCHEMO, POCHCT in the last 72 hours.     Coags: No results found for: PROTIME, INR, APTT    HCG (If Applicable): No results found for: PREGTESTUR, PREGSERUM, HCG, HCGQUANT     ABGs: No results found for: PHART, PO2ART, AXO1VTU, GHB6SKE, BEART, L7OPIEZW     Type & Screen (If Applicable):  No results found for: LABABO, LABRH    Drug/Infectious Status (If Applicable):  Lab Results   Component Value Date/Time    HEPCAB 0.1 07/30/2020 11:22 AM       COVID-19 Screening (If Applicable):   Lab Results   Component Value Date/Time    COVID19 Performed 12/21/2021 03:05 PM    COVID19 Not Detected 12/21/2021 03:05 PM           Anesthesia Evaluation  Patient summary reviewed and Nursing notes reviewed no history of anesthetic complications:   Airway: Mallampati: II          Dental: normal exam         Pulmonary:Negative Pulmonary ROS breath sounds clear to auscultation                             Cardiovascular:Negative CV ROS  Exercise tolerance: good (>4 METS),                     Neuro/Psych:   (+) depression/anxiety             GI/Hepatic/Renal:   (+) GERD:,           Endo/Other: Negative Endo/Other ROS

## 2023-08-23 NOTE — OP NOTE
Operative Note      Patient: Kevon Cruz  YOB: 1985  MRN: [id number]    Date of Procedure: 2023    Pre-Op Diagnosis Codes:     * Hx of  section [Z98.891]    Post-Op Diagnosis: Same       Procedure(s):   SECTION  BILATERAL SALPINGECTOMY    Surgeon(s):  Heather Do MD    Assistant:   * No surgical staff found *    Anesthesia: Spinal    Estimated Blood Loss (mL): 848    Complications: None    Specimens:   ID Type Source Tests Collected by Time Destination   A : Bilateral Fallopian Tubes Tissue Fallopian Tube SURGICAL PATHOLOGY Heather Do MD 2023 1344        Implants:  * No implants in log *      Drains:   Urinary Catheter 23 Gonzalez (Active)   Catheter Best Practices  Drainage tube clipped to bed;Catheter secured to thigh; Tamper seal intact; Bag below bladder;Bag not on floor; Lack of dependent loop in tubing;Drainage bag less than half full 23 1425       Findings:   Live vigorous female infant weighing 7 lbs 14 oz with APGARS 9, 9. Moderate scar tissue of anterior abdomen. Normal uterus, tubes and ovaries BL. BL fallopian tubes removed and sent as specimen. Detailed Description of Procedure:   Informed consent was obtained prior to proceeding to the operating room. The patient was taken to the operating room where spinal anesthesia was found to be adequate. Time out was done to confirm the operating procedure, surgeon, patient and site. Once confirmed by the team, procedure was started. The patient was prepped and draped in the normal sterile fashion. A Pfannenstiel skin incision was made with a scalpel and carried down to the underlying fascia using the Bovie. The fascial incision was extended laterally sharply. The rectus muscles were divided in the midline bluntly. The peritoneum was entered and extended bluntly with good visualization of the bladder. A bladder blade was then inserted.   A low transverse uterine incision was made with the

## 2023-08-23 NOTE — ANESTHESIA POSTPROCEDURE EVALUATION
Department of Anesthesiology  Postprocedure Note    Patient: Ashley Forbes  MRN: [id number]  YOB: 1985  Date of evaluation: 2023      Procedure Summary     Date: 23 [place] [facility] [place]  [facility]    Anesthesia Start: 1253 Anesthesia Stop: 1445    Procedure:  SECTION (Abdomen) Diagnosis:       Hx of  section      (REPEAT C/S, ISAÍAS )    Surgeons: Jose Casanova MD Responsible Provider: Brian Paez MD    Anesthesia Type: Spinal ASA Status: 3          Anesthesia Type: Spinal    Jn Phase I: Jn Score: 9    Jn Phase II: Jn Score: 10      Anesthesia Post Evaluation    Patient location during evaluation: bedside  Patient participation: complete - patient participated  Level of consciousness: awake and alert  Airway patency: patent  Nausea & Vomiting: no nausea  Complications: no  Cardiovascular status: hemodynamically stable  Respiratory status: acceptable and nonlabored ventilation  Hydration status: stable  Comments: Mild itching  Multimodal analgesia pain management approach

## 2023-08-24 ENCOUNTER — ANESTHESIA (OUTPATIENT)
Dept: MOTHER INFANT UNIT | Age: 38
End: 2023-08-24

## 2023-08-24 ENCOUNTER — ANESTHESIA EVENT (OUTPATIENT)
Dept: MOTHER INFANT UNIT | Age: 38
End: 2023-08-24

## 2023-08-24 LAB
ERYTHROCYTE [DISTWIDTH] IN BLOOD BY AUTOMATED COUNT: 14 % (ref 11.9–14.6)
HCT VFR BLD AUTO: 31.9 % (ref 35.8–46.3)
HGB BLD-MCNC: 10.8 G/DL (ref 11.7–15.4)
MCH RBC QN AUTO: 29.8 PG (ref 26.1–32.9)
MCHC RBC AUTO-ENTMCNC: 33.9 G/DL (ref 31.4–35)
MCV RBC AUTO: 87.9 FL (ref 82–102)
NRBC # BLD: 0 K/UL (ref 0–0.2)
PLATELET # BLD AUTO: 112 K/UL (ref 150–450)
PMV BLD AUTO: 11 FL (ref 9.4–12.3)
RBC # BLD AUTO: 3.63 M/UL (ref 4.05–5.2)
WBC # BLD AUTO: 8.2 K/UL (ref 4.3–11.1)

## 2023-08-24 PROCEDURE — 6370000000 HC RX 637 (ALT 250 FOR IP): Performed by: OBSTETRICS & GYNECOLOGY

## 2023-08-24 PROCEDURE — 6370000000 HC RX 637 (ALT 250 FOR IP): Performed by: ANESTHESIOLOGY

## 2023-08-24 PROCEDURE — 85027 COMPLETE CBC AUTOMATED: CPT

## 2023-08-24 PROCEDURE — 1100000000 HC RM PRIVATE

## 2023-08-24 PROCEDURE — 6360000002 HC RX W HCPCS: Performed by: ANESTHESIOLOGY

## 2023-08-24 PROCEDURE — 36415 COLL VENOUS BLD VENIPUNCTURE: CPT

## 2023-08-24 RX ORDER — SODIUM CHLORIDE 0.9 % (FLUSH) 0.9 %
5-40 SYRINGE (ML) INJECTION PRN
Status: DISCONTINUED | OUTPATIENT
Start: 2023-08-24 | End: 2023-08-25 | Stop reason: HOSPADM

## 2023-08-24 RX ORDER — DOCUSATE SODIUM 100 MG/1
100 CAPSULE, LIQUID FILLED ORAL 2 TIMES DAILY
Status: DISCONTINUED | OUTPATIENT
Start: 2023-08-24 | End: 2023-08-25 | Stop reason: HOSPADM

## 2023-08-24 RX ORDER — SODIUM CHLORIDE 9 MG/ML
INJECTION, SOLUTION INTRAVENOUS PRN
Status: DISCONTINUED | OUTPATIENT
Start: 2023-08-24 | End: 2023-08-25 | Stop reason: HOSPADM

## 2023-08-24 RX ORDER — ACETAMINOPHEN 500 MG
1000 TABLET ORAL EVERY 6 HOURS PRN
Status: DISCONTINUED | OUTPATIENT
Start: 2023-08-24 | End: 2023-08-25 | Stop reason: HOSPADM

## 2023-08-24 RX ORDER — IBUPROFEN 800 MG/1
800 TABLET ORAL EVERY 6 HOURS
Status: DISCONTINUED | OUTPATIENT
Start: 2023-08-24 | End: 2023-08-25 | Stop reason: HOSPADM

## 2023-08-24 RX ORDER — LANOLIN
CREAM (ML) TOPICAL
Status: DISCONTINUED | OUTPATIENT
Start: 2023-08-24 | End: 2023-08-25 | Stop reason: HOSPADM

## 2023-08-24 RX ORDER — OXYCODONE HYDROCHLORIDE 5 MG/1
5 TABLET ORAL EVERY 4 HOURS PRN
Status: DISCONTINUED | OUTPATIENT
Start: 2023-08-24 | End: 2023-08-25 | Stop reason: HOSPADM

## 2023-08-24 RX ORDER — POLYETHYLENE GLYCOL 3350 17 G/17G
17 POWDER, FOR SOLUTION ORAL DAILY
Status: DISCONTINUED | OUTPATIENT
Start: 2023-08-24 | End: 2023-08-25 | Stop reason: HOSPADM

## 2023-08-24 RX ORDER — FAMOTIDINE 20 MG/1
20 TABLET, FILM COATED ORAL 2 TIMES DAILY
Status: DISCONTINUED | OUTPATIENT
Start: 2023-08-24 | End: 2023-08-25 | Stop reason: HOSPADM

## 2023-08-24 RX ORDER — SODIUM CHLORIDE 0.9 % (FLUSH) 0.9 %
5-40 SYRINGE (ML) INJECTION EVERY 12 HOURS SCHEDULED
Status: DISCONTINUED | OUTPATIENT
Start: 2023-08-24 | End: 2023-08-25 | Stop reason: HOSPADM

## 2023-08-24 RX ORDER — NALOXONE HYDROCHLORIDE 0.4 MG/ML
0.4 INJECTION, SOLUTION INTRAMUSCULAR; INTRAVENOUS; SUBCUTANEOUS PRN
Status: DISCONTINUED | OUTPATIENT
Start: 2023-08-24 | End: 2023-08-25 | Stop reason: HOSPADM

## 2023-08-24 RX ORDER — OXYCODONE HYDROCHLORIDE 5 MG/1
10 TABLET ORAL EVERY 4 HOURS PRN
Status: DISCONTINUED | OUTPATIENT
Start: 2023-08-24 | End: 2023-08-25 | Stop reason: HOSPADM

## 2023-08-24 RX ORDER — SIMETHICONE 80 MG
80 TABLET,CHEWABLE ORAL EVERY 6 HOURS PRN
Status: DISCONTINUED | OUTPATIENT
Start: 2023-08-24 | End: 2023-08-25 | Stop reason: HOSPADM

## 2023-08-24 RX ORDER — DIPHENHYDRAMINE HYDROCHLORIDE 50 MG/ML
25 INJECTION INTRAMUSCULAR; INTRAVENOUS EVERY 6 HOURS PRN
Status: DISCONTINUED | OUTPATIENT
Start: 2023-08-24 | End: 2023-08-25 | Stop reason: HOSPADM

## 2023-08-24 RX ORDER — ONDANSETRON 8 MG/1
8 TABLET, ORALLY DISINTEGRATING ORAL EVERY 8 HOURS PRN
Status: DISCONTINUED | OUTPATIENT
Start: 2023-08-24 | End: 2023-08-25 | Stop reason: HOSPADM

## 2023-08-24 RX ORDER — SODIUM CHLORIDE, SODIUM LACTATE, POTASSIUM CHLORIDE, CALCIUM CHLORIDE 600; 310; 30; 20 MG/100ML; MG/100ML; MG/100ML; MG/100ML
INJECTION, SOLUTION INTRAVENOUS CONTINUOUS
Status: DISCONTINUED | OUTPATIENT
Start: 2023-08-24 | End: 2023-08-25 | Stop reason: HOSPADM

## 2023-08-24 RX ADMIN — METRONIDAZOLE 500 MG: 500 TABLET ORAL at 05:50

## 2023-08-24 RX ADMIN — CEPHALEXIN 500 MG: 500 CAPSULE ORAL at 22:17

## 2023-08-24 RX ADMIN — IBUPROFEN 800 MG: 800 TABLET, FILM COATED ORAL at 14:44

## 2023-08-24 RX ADMIN — METRONIDAZOLE 500 MG: 500 TABLET ORAL at 14:44

## 2023-08-24 RX ADMIN — ACETAMINOPHEN 1000 MG: 500 TABLET, FILM COATED ORAL at 18:24

## 2023-08-24 RX ADMIN — KETOROLAC TROMETHAMINE 30 MG: 30 INJECTION, SOLUTION INTRAMUSCULAR; INTRAVENOUS at 01:58

## 2023-08-24 RX ADMIN — ACETAMINOPHEN 1000 MG: 500 TABLET, FILM COATED ORAL at 11:54

## 2023-08-24 RX ADMIN — CEPHALEXIN 500 MG: 500 CAPSULE ORAL at 05:50

## 2023-08-24 RX ADMIN — DOCUSATE SODIUM 100 MG: 100 CAPSULE, LIQUID FILLED ORAL at 20:52

## 2023-08-24 RX ADMIN — METRONIDAZOLE 500 MG: 500 TABLET ORAL at 22:17

## 2023-08-24 RX ADMIN — CEPHALEXIN 500 MG: 500 CAPSULE ORAL at 14:44

## 2023-08-24 RX ADMIN — ACETAMINOPHEN 1000 MG: 500 TABLET, FILM COATED ORAL at 05:51

## 2023-08-24 RX ADMIN — KETOROLAC TROMETHAMINE 30 MG: 30 INJECTION, SOLUTION INTRAMUSCULAR; INTRAVENOUS at 08:37

## 2023-08-24 RX ADMIN — IBUPROFEN 800 MG: 800 TABLET, FILM COATED ORAL at 20:52

## 2023-08-24 NOTE — PROGRESS NOTES
Questions encouraged and answered. Patent encouraged to call for needs or concerns. Safety Teaching reviewed:   Hand hygiene prior to handling the infant. Use of bulb syringe. Bracelets with matching numbers are placed on mother and infant  An infant security tag  (Hugs) is placed on the infant's ankle and monitored  All OB nurses wear pink Employee badges - do not give your baby to anyone without proper identification. Never leave the baby alone in the room. The infant should be placed on their back to sleep. on a firm mattress. No toys should be placed in the crib. (safe sleep video offered to view)  Never shake the baby (video offered to view)  Infant fall prevention - do not sleep with the baby, and place the baby in the crib while ambulating. Mother and Baby Care booklet given to Mother.

## 2023-08-24 NOTE — LACTATION NOTE
This note was copied from a baby's chart. Lactation visit. 3rd time mom, has exclusively pumped in the past, other 2 did not latch. This baby just under 25 hours old. Has latched fairly well to right breast per mom but not left breast. Mom asking for manual pump to help pasha nipple prior to latch on attempt. Manual pump given and set up, reviewed how to use. Mom will try manual pump just prior to latching to see if it will pasha nipple enough for baby to latch and sustain on left breast. Discussed use of electric pump x 15 minutes every feeding if baby not latching well, especially after 24 hours of life. Mom states understanding. Need to stimulate to protect milk supply. Mom has her personal medela pump in the room with her and offered hospital pump as well if desired for use while here. Offered help with latching also. Mom to call out as needed today. Questions answered.

## 2023-08-24 NOTE — CARE COORDINATION
Chart reviewed - depression/anxiety. SW met with patient to complete initial assessment. Patient states that she's been on Zoloft \"for years,\" and she feels that it manages her depression/anxiety well. Per patient, \"We increase the dose with each pregnancy, and that's the plan this time. \"      Patient given informational packet on  mood & anxiety disorders (resources/education). Family denies any additional needs from  at this time. Family has 's contact information should any needs/questions arise.     BAKARI Rodriguez, [address] [place] [address]   [phone]

## 2023-08-24 NOTE — LACTATION NOTE

## 2023-08-24 NOTE — PROGRESS NOTES
Gonzalez d/c'd, IV capped, sequential device discontinued. Ambulated to restroom for teaching of shelby-care and pad change. Bed linen changed. Returned to bed safely. Tolerated without difficulty.

## 2023-08-25 VITALS
DIASTOLIC BLOOD PRESSURE: 71 MMHG | RESPIRATION RATE: 20 BRPM | TEMPERATURE: 97.6 F | SYSTOLIC BLOOD PRESSURE: 115 MMHG | HEART RATE: 66 BPM | OXYGEN SATURATION: 99 %

## 2023-08-25 PROCEDURE — 6370000000 HC RX 637 (ALT 250 FOR IP): Performed by: OBSTETRICS & GYNECOLOGY

## 2023-08-25 RX ORDER — OXYCODONE HYDROCHLORIDE 5 MG/1
5 TABLET ORAL EVERY 4 HOURS PRN
Qty: 30 TABLET | Refills: 0 | Status: SHIPPED | OUTPATIENT
Start: 2023-08-25 | End: 2023-08-30

## 2023-08-25 RX ORDER — IBUPROFEN 800 MG/1
800 TABLET ORAL EVERY 6 HOURS PRN
Qty: 60 TABLET | Refills: 1 | Status: SHIPPED | OUTPATIENT
Start: 2023-08-25

## 2023-08-25 RX ADMIN — ACETAMINOPHEN 1000 MG: 500 TABLET, FILM COATED ORAL at 06:13

## 2023-08-25 RX ADMIN — CEPHALEXIN 500 MG: 500 CAPSULE ORAL at 06:13

## 2023-08-25 RX ADMIN — METRONIDAZOLE 500 MG: 500 TABLET ORAL at 06:14

## 2023-08-25 RX ADMIN — IBUPROFEN 800 MG: 800 TABLET, FILM COATED ORAL at 03:02

## 2023-08-25 RX ADMIN — ACETAMINOPHEN 1000 MG: 500 TABLET, FILM COATED ORAL at 00:32

## 2023-08-25 RX ADMIN — DOCUSATE SODIUM 100 MG: 100 CAPSULE, LIQUID FILLED ORAL at 09:13

## 2023-08-25 RX ADMIN — IBUPROFEN 800 MG: 800 TABLET, FILM COATED ORAL at 09:13

## 2023-08-25 ASSESSMENT — PAIN DESCRIPTION - LOCATION
LOCATION: ABDOMEN;INCISION
LOCATION: ABDOMEN;INCISION

## 2023-08-25 ASSESSMENT — PAIN SCALES - GENERAL
PAINLEVEL_OUTOF10: 4
PAINLEVEL_OUTOF10: 4

## 2023-08-25 ASSESSMENT — PAIN DESCRIPTION - DESCRIPTORS
DESCRIPTORS: SORE
DESCRIPTORS: SORE

## 2023-08-25 ASSESSMENT — PAIN DESCRIPTION - ORIENTATION
ORIENTATION: ANTERIOR;LOWER
ORIENTATION: ANTERIOR;LOWER

## 2023-08-25 ASSESSMENT — PAIN - FUNCTIONAL ASSESSMENT
PAIN_FUNCTIONAL_ASSESSMENT: ACTIVITIES ARE NOT PREVENTED
PAIN_FUNCTIONAL_ASSESSMENT: ACTIVITIES ARE NOT PREVENTED

## 2023-08-25 NOTE — DISCHARGE INSTRUCTIONS
Call MD office or go to the ER if experiencing fever (greater than 100.4 deg F), heavy vaginal bleeding greater than full soaking of 1 pad per hour x 2 or more hours, foul smelling vaginal discharge, thoughts of suicide or homicide, pain uncontrolled with oral medication, or any other major medical concerns.

## 2023-09-06 ENCOUNTER — POSTPARTUM VISIT (OUTPATIENT)
Dept: OBGYN CLINIC | Age: 38
End: 2023-09-06

## 2023-09-06 VITALS
WEIGHT: 229 LBS | SYSTOLIC BLOOD PRESSURE: 120 MMHG | HEIGHT: 61 IN | DIASTOLIC BLOOD PRESSURE: 82 MMHG | BODY MASS INDEX: 43.23 KG/M2

## 2023-09-06 DIAGNOSIS — Z98.890 POST-OPERATIVE STATE: Primary | ICD-10-CM

## 2023-09-06 PROBLEM — U07.1 COVID-19 AFFECTING PREGNANCY IN THIRD TRIMESTER: Status: RESOLVED | Noted: 2021-01-22 | Resolved: 2023-09-06

## 2023-09-06 PROBLEM — O98.513 COVID-19 AFFECTING PREGNANCY IN THIRD TRIMESTER: Status: RESOLVED | Noted: 2021-01-22 | Resolved: 2023-09-06

## 2023-09-06 PROBLEM — O09.523 HIGH RISK PREGNANCY, MULTIGRAVIDA OF ADVANCED MATERNAL AGE IN THIRD TRIMESTER: Status: RESOLVED | Noted: 2023-02-27 | Resolved: 2023-09-06

## 2023-09-06 PROBLEM — O98.513 COVID-19 AFFECTING PREGNANCY IN THIRD TRIMESTER: Status: ACTIVE | Noted: 2021-01-22

## 2023-09-06 PROBLEM — O09.43 HIGH RISK MULTIGRAVIDA IN THIRD TRIMESTER: Status: RESOLVED | Noted: 2021-01-05 | Resolved: 2023-09-06

## 2023-09-06 PROBLEM — O09.43 HIGH RISK MULTIGRAVIDA IN THIRD TRIMESTER: Status: ACTIVE | Noted: 2021-01-05

## 2023-09-06 PROBLEM — U07.1 COVID-19 AFFECTING PREGNANCY IN THIRD TRIMESTER: Status: ACTIVE | Noted: 2021-01-22

## 2023-09-06 PROBLEM — O24.410 DIET CONTROLLED GESTATIONAL DIABETES MELLITUS (GDM) IN THIRD TRIMESTER: Status: ACTIVE | Noted: 2021-02-09

## 2023-09-06 PROCEDURE — 99902 PR PRENATAL VISIT: CPT | Performed by: OBSTETRICS & GYNECOLOGY

## 2023-09-06 RX ORDER — SERTRALINE HYDROCHLORIDE 100 MG/1
200 TABLET, FILM COATED ORAL DAILY
Qty: 180 TABLET | Refills: 4 | Status: SHIPPED | OUTPATIENT
Start: 2023-09-06

## 2023-09-06 NOTE — PROGRESS NOTES
Chief Complaint   Patient presents with    Postpartum Care     Delivery by  23. No issues. Subjective: This 41 y.o. female presents today for a post-partum visit after . Delivery Method: . Status of Baby: Disposition of baby: home.      Postpartum Contraception: none      Past Medical History:   Diagnosis Date    Abnormal Papanicolaou smear of cervix     Anxiety     Atypical mole 2019    Depression     Elevated prolactin level     Gastric ulcer     GERD (gastroesophageal reflux disease)     H/O seasonal allergies     History of dysplastic nevus 2019    from mid/upper back path report dysplastic completely excised - see scanned documents     Infertility, female     anovulation, male factor    LGSIL (low grade squamous intraepithelial dysplasia)     Morbid obesity ([address]) 2019    Varicose vein of leg     RLE     Past Surgical History:   Procedure Laterality Date     SECTION  2018     SECTION N/A 2023     SECTION performed by Jean-Claude Azul MD at [facility] L[facility]    [address]  16    Hysteroscopy/polypectomy    GYN       IUI's    HYSTEROSCOPY      uterine polyps    LEEP      PELVIC LAPAROSCOPY  17    Laser     Social History     Socioeconomic History    Marital status:      Spouse name: Not on file    Number of children: Not on file    Years of education: Not on file    Highest education level: Not on file   Occupational History    Not on file   Tobacco Use    Smoking status: Former     Types: E-Cigarettes    Smokeless tobacco: Never    Tobacco comments:     Quit smoking: former some day smoker for about 2 yrs in her 20's   Vaping Use    Vaping Use: Never used   Substance and Sexual Activity    Alcohol use: Not Currently     Alcohol/week: 1.7 standard drinks    Drug use: Never    Sexual activity: Yes     Partners: Male     Birth control/protection: None     Comment:  - same partner x many years

## 2023-09-12 NOTE — PROGRESS NOTES
Med refill. Last seen  11/16/22 vulvovaginitis. [facility]. Thank you  Pt presented to OB [facility] for NST due to Oligo. Will have PC/S for breech on Wednesday. 2650 Dr Venkatesh Gomez called. States if NST is reactive pt may go home with precautions. 0932  Reactive NST notes. No decels seen.

## 2023-10-12 NOTE — ANESTHESIA PROCEDURE NOTES
Spinal Block    Patient location during procedure: OR  End time: 8/23/2023 1:15 PM  Reason for block: primary anesthetic  Staffing  Performed: anesthesiologist   Anesthesiologist: Yakelin Lin MD  Spinal Block  Patient position: sitting  Prep: ChloraPrep  Patient monitoring: cardiac monitor, continuous pulse ox and frequent blood pressure checks  Approach: midline  Location: L3/L4  Provider prep: sterile gloves and mask  Local infiltration: lidocaine  Needle  Needle type: pencil-tip   Needle gauge: 24 G  Needle length: 4 in  Assessment  Events: cerebrospinal fluid  Swirl obtained: Yes  CSF: clear  Attempts: 1  Hemodynamics: stable  Preanesthetic Checklist  Completed: patient identified, IV checked, risks and benefits discussed, surgical/procedural consents, equipment checked, pre-op evaluation, timeout performed, anesthesia consent given, oxygen available and monitors applied/VS acknowledged
Normal for race

## 2024-01-31 DIAGNOSIS — R00.2 PALPITATIONS: Primary | ICD-10-CM

## 2024-02-01 DIAGNOSIS — R00.2 PALPITATIONS: ICD-10-CM

## 2024-02-01 LAB
ANION GAP SERPL CALC-SCNC: 4 MMOL/L (ref 2–11)
BUN SERPL-MCNC: 18 MG/DL (ref 6–23)
CALCIUM SERPL-MCNC: 9.3 MG/DL (ref 8.3–10.4)
CHLORIDE SERPL-SCNC: 109 MMOL/L (ref 103–113)
CO2 SERPL-SCNC: 29 MMOL/L (ref 21–32)
CREAT SERPL-MCNC: 0.7 MG/DL (ref 0.6–1)
GLUCOSE SERPL-MCNC: 114 MG/DL (ref 65–100)
MAGNESIUM SERPL-MCNC: 1.9 MG/DL (ref 1.8–2.4)
POTASSIUM SERPL-SCNC: 4.6 MMOL/L (ref 3.5–5.1)
SODIUM SERPL-SCNC: 142 MMOL/L (ref 136–146)
T4 FREE SERPL-MCNC: 0.7 NG/DL (ref 0.78–1.46)
TSH, 3RD GENERATION: 1.19 UIU/ML (ref 0.36–3.74)

## 2024-06-18 DIAGNOSIS — R10.11 RUQ PAIN: Primary | ICD-10-CM

## 2024-06-18 DIAGNOSIS — R11.2 NAUSEA AND VOMITING, UNSPECIFIED VOMITING TYPE: ICD-10-CM

## 2024-06-19 ENCOUNTER — HOSPITAL ENCOUNTER (OUTPATIENT)
Dept: ULTRASOUND IMAGING | Age: 39
Discharge: HOME | End: 2024-06-22
Payer: COMMERCIAL

## 2024-06-19 DIAGNOSIS — R11.2 NAUSEA AND VOMITING, UNSPECIFIED VOMITING TYPE: ICD-10-CM

## 2024-06-19 DIAGNOSIS — R10.11 RUQ PAIN: ICD-10-CM

## 2024-06-19 PROCEDURE — 76705 ECHO EXAM OF ABDOMEN: CPT

## 2024-06-24 ENCOUNTER — PREP FOR PROCEDURE (OUTPATIENT)
Dept: SURGERY | Age: 39
End: 2024-06-24

## 2024-06-24 ENCOUNTER — OFFICE VISIT (OUTPATIENT)
Dept: SURGERY | Age: 39
End: 2024-06-24
Payer: COMMERCIAL

## 2024-06-24 VITALS
DIASTOLIC BLOOD PRESSURE: 76 MMHG | HEIGHT: 61 IN | BODY MASS INDEX: 43.23 KG/M2 | SYSTOLIC BLOOD PRESSURE: 117 MMHG | HEART RATE: 67 BPM | WEIGHT: 229 LBS

## 2024-06-24 DIAGNOSIS — K80.12 CALCULUS OF GALLBLADDER WITH ACUTE ON CHRONIC CHOLECYSTITIS WITHOUT OBSTRUCTION: Primary | ICD-10-CM

## 2024-06-24 DIAGNOSIS — K81.9 CHOLECYSTITIS, UNSPECIFIED: ICD-10-CM

## 2024-06-24 DIAGNOSIS — E66.01 OBESITY, CLASS III, BMI 40-49.9 (MORBID OBESITY) (HCC): ICD-10-CM

## 2024-06-24 PROCEDURE — 99204 OFFICE O/P NEW MOD 45 MIN: CPT | Performed by: SURGERY

## 2024-06-24 NOTE — PROGRESS NOTES
[facility]  [facility]  [address], [address]  [place] [address]  [phone]    Office Note/Consult Note   Lizeth Munoz   MRN: [id number]     : 1985        HPI: Lizeth Munoz is a 38 y.o. female who is seen in the office on 2024.  She experienced severe right upper quadrant pain which wraps around her back the night of .  The pain was severe and lasted greater than 3 hours.  She almost went to the emergency room.  She spoke to one of our surgeons who ordered an ultrasound which was performed on .  This shows a contracted gallbladder full of stones.  There is gallbladder wall thickening.  They do not report pericholecystic fluid.  There is clearly some inflammatory stranding around the gallbladder.  Common bile duct is nondilated.  Liver is enlarged with normal echogenicity.  She has not had blood work with LFTs since last year.  She is mostly asymptomatic at this time.  She has had no jaundice symptoms.  She has had no previous symptoms like this.  She has had 3 prior pregnancies.  Her last pregnancy was in 2023 with  section delivery.  She has had 3 total C-sections and gynecologic laparoscopy for infertility.  She has had no other abdominal surgeries.  She reports having had most like an ultrasound and a HIDA scan around .  She believes she had a mildly reduced ejection fraction but did not believe there was any identification of cholelithiasis.  She is trying to eat light meals at this time.  She has had no vomiting.    She is the head nurse of the OR.    Past Medical History:   Diagnosis Date    Abnormal Papanicolaou smear of cervix     Anxiety     Atypical mole 2019    Depression     Elevated prolactin level     Gastric ulcer     GERD (gastroesophageal reflux disease)     H/O seasonal allergies     History of dysplastic nevus 2019    from mid/upper back path report dysplastic completely excised - see

## 2024-07-09 RX ORDER — SODIUM CHLORIDE 9 MG/ML
INJECTION, SOLUTION INTRAVENOUS PRN
Status: CANCELLED | OUTPATIENT
Start: 2024-07-09

## 2024-07-09 RX ORDER — INDOCYANINE GREEN AND WATER 25 MG
2.5 KIT INJECTION ONCE
Status: CANCELLED | OUTPATIENT
Start: 2024-07-09 | End: 2024-07-09

## 2024-07-09 RX ORDER — SODIUM CHLORIDE 0.9 % (FLUSH) 0.9 %
5-40 SYRINGE (ML) INJECTION EVERY 12 HOURS SCHEDULED
Status: CANCELLED | OUTPATIENT
Start: 2024-07-09

## 2024-07-09 RX ORDER — SODIUM CHLORIDE 0.9 % (FLUSH) 0.9 %
5-40 SYRINGE (ML) INJECTION PRN
Status: CANCELLED | OUTPATIENT
Start: 2024-07-09

## 2024-08-02 NOTE — PERIOP NOTE
Patient verified name and .  Order for consent found in EHR and matches case posting; patient verifies procedure.   Type 1B surgery, PAT phone assessment complete.  Orders received.  Labs per surgeon: lipase, CMP, CBC with diff for walk-in labs. Patient states she will come to the pre-surgery center to have completed before procedure. Chart flagged for charge nurse  Labs per anesthesia protocol: none    Patient answered medical/surgical history questions at their best of ability. All prior to admission medications documented in EPIC.    Patient instructed to continue taking all prescription medications up to the day of surgery but to take only the following medications the day of surgery according to anesthesia guidelines with a small sip of water: sertraline Also, patient is requested to take 2 Tylenol in the morning and then again before bed on the day before surgery. Regular or extra strength may be used.       Patient informed that all vitamins and supplements should be held 7 days prior to surgery and NSAIDS 5 days prior to surgery. Prescription meds to hold:none    Patient instructed on the following:    > Arrive at Main Entrance, time of arrival to be called the day before by 1700  > NPO after midnight, unless otherwise indicated, including gum, mints, and ice chips  > Responsible adult must drive patient to the hospital, stay during surgery, and patient will need supervision 24 hours after anesthesia  > Use non moisturizing soap in shower the night before surgery and on the morning of surgery  > All piercings must be removed prior to arrival.    > Leave all valuables (money and jewelry) at home but bring insurance card and ID on DOS.   > You may be required to pay a deductible or co-pay on the day of your procedure. You can pre-pay by calling [phone] if your surgery is at the [facility] or [phone] if your surgery is at the [facility].  > Do not wear make-up, nail polish, lotions, cologne,  perfumes, powders, or oil on skin. Artificial nails are not permitted.

## 2024-08-06 ENCOUNTER — ANESTHESIA EVENT (OUTPATIENT)
Dept: SURGERY | Age: 39
End: 2024-08-06
Payer: COMMERCIAL

## 2024-08-06 NOTE — H&P
[facility]  [facility]  [address], [address]  [place] [address]  [phone]    H&P Note   Lizeth Munoz   MRN: [id number]     : 1985        HPI: Lizeth Munoz is a 39 y.o. female who is seen in the office on 2024.  She experienced severe right upper quadrant pain which wraps around her back the night of .  The pain was severe and lasted greater than 3 hours.  She almost went to the emergency room.  She spoke to one of our surgeons who ordered an ultrasound which was performed on .  This shows a contracted gallbladder full of stones.  There is gallbladder wall thickening.  They do not report pericholecystic fluid.  There is clearly some inflammatory stranding around the gallbladder.  Common bile duct is nondilated.  Liver is enlarged with normal echogenicity.  She has not had blood work with LFTs since last year.  She is mostly asymptomatic at this time.  She has had no jaundice symptoms.  She has had no previous symptoms like this.  She has had 3 prior pregnancies.  Her last pregnancy was in 2023 with  section delivery.  She has had 3 total C-sections and gynecologic laparoscopy for infertility.  She has had no other abdominal surgeries.  She reports having had most like an ultrasound and a HIDA scan around .  She believes she had a mildly reduced ejection fraction but did not believe there was any identification of cholelithiasis.  She is trying to eat light meals at this time.  She has had no vomiting.    She is the head nurse of the OR.    Past Medical History:   Diagnosis Date    Abnormal Papanicolaou smear of cervix     Adverse effect of anesthesia     states had \"a vagal response\" with     Anxiety     Atypical mole 2019    Depression     Elevated prolactin level     Gastric ulcer     GERD (gastroesophageal reflux disease)     resolved per pt    H/O seasonal allergies     History of dysplastic nevus  (Non-Medical): No   Housing Stability: Unknown (2/27/2023)    Housing Stability Vital Sign     Unstable Housing in the Last Year: No     Social History     Tobacco Use   Smoking Status Former    Types: E-Cigarettes   Smokeless Tobacco Never   Tobacco Comments    Quit smoking: former some day smoker for about 2 yrs in her 20's     Family History   Problem Relation Age of Onset    No Known Problems Father     Rheum Arthritis Brother     Other Mother         ulcerative Colitis    Celiac Disease Sister        ROS: The patient has no difficulty with chest pain or shortness of breath.  No fever or chills.  The patient denies any personal or family history of abnormal clotting or bleeding.  Comprehensive review of systems was otherwise unremarkable except as noted above.    Physical Exam:   Constitutional: Alert oriented cooperative patient in no acute distress.   /76  Pulse 67  Ht 1.549 m (5' 1\")   Wt 99.8 kg (220 lb)   Breastfeeding No   BMI 41.57 kg/m²   Eyes:Sclera are clear without icterus.   ENMT: no obvious neck masses, no ear or lip lesions  CV: RRR. Normal perfusion  Resp: No JVD.  Breathing is  non-labored.    GI: Obese, soft and non-distended, well-healed infraumbilical and Pfannenstiel incision scars, mild tenderness to deep palpation in the right upper quadrant without palpable mass, negative Williamson sign     Musculoskeletal: unremarkable with normal function.   Neuro:  No obvious focal deficits  Psychiatric: normal affect and mood, no memory impairment    Lab Results   Component Value Date/Time    WBC 8.2 08/24/2023 06:57 AM    HGB 10.8 08/24/2023 06:57 AM    HCT 31.9 08/24/2023 06:57 AM     08/24/2023 06:57 AM    MCV 87.9 08/24/2023 06:57 AM       Lab Results   Component Value Date     02/01/2024    K 4.6 02/01/2024     02/01/2024    CO2 29 02/01/2024    BUN 18 02/01/2024    CREATININE 0.70 02/01/2024    GLUCOSE 114 (H) 02/01/2024    CALCIUM 9.3 02/01/2024    BILITOT 0.1 (L)

## 2024-08-07 ENCOUNTER — ANESTHESIA (OUTPATIENT)
Dept: SURGERY | Age: 39
End: 2024-08-07
Payer: COMMERCIAL

## 2024-08-07 ENCOUNTER — HOSPITAL ENCOUNTER (OUTPATIENT)
Age: 39
Setting detail: OUTPATIENT SURGERY
Discharge: HOME | End: 2024-08-07
Attending: SURGERY | Admitting: SURGERY
Payer: COMMERCIAL

## 2024-08-07 VITALS
DIASTOLIC BLOOD PRESSURE: 58 MMHG | WEIGHT: 236 LBS | HEART RATE: 68 BPM | RESPIRATION RATE: 18 BRPM | HEIGHT: 61 IN | OXYGEN SATURATION: 96 % | TEMPERATURE: 98.1 F | BODY MASS INDEX: 44.56 KG/M2 | SYSTOLIC BLOOD PRESSURE: 102 MMHG

## 2024-08-07 DIAGNOSIS — K80.12 CALCULUS OF GALLBLADDER WITH ACUTE ON CHRONIC CHOLECYSTITIS WITHOUT OBSTRUCTION: Primary | ICD-10-CM

## 2024-08-07 LAB
ANION GAP SERPL CALC-SCNC: 11 MMOL/L (ref 9–18)
BASOPHILS # BLD: 0 K/UL (ref 0–0.2)
BASOPHILS NFR BLD: 1 % (ref 0–2)
BUN SERPL-MCNC: 8 MG/DL (ref 6–23)
CALCIUM SERPL-MCNC: 9 MG/DL (ref 8.8–10.2)
CHLORIDE SERPL-SCNC: 105 MMOL/L (ref 98–107)
CO2 SERPL-SCNC: 23 MMOL/L (ref 20–28)
CREAT SERPL-MCNC: 0.53 MG/DL (ref 0.6–1.1)
DIFFERENTIAL METHOD BLD: NORMAL
EOSINOPHIL # BLD: 0.1 K/UL (ref 0–0.8)
EOSINOPHIL NFR BLD: 2 % (ref 0.5–7.8)
ERYTHROCYTE [DISTWIDTH] IN BLOOD BY AUTOMATED COUNT: 14.6 % (ref 11.9–14.6)
GLUCOSE SERPL-MCNC: 97 MG/DL (ref 70–99)
HCG UR QL: NEGATIVE
HCT VFR BLD AUTO: 41.9 % (ref 35.8–46.3)
HGB BLD-MCNC: 14.2 G/DL (ref 11.7–15.4)
IMM GRANULOCYTES # BLD AUTO: 0 K/UL (ref 0–0.5)
IMM GRANULOCYTES NFR BLD AUTO: 0 % (ref 0–5)
LIPASE SERPL-CCNC: 37 U/L (ref 13–60)
LYMPHOCYTES # BLD: 1.2 K/UL (ref 0.5–4.6)
LYMPHOCYTES NFR BLD: 18 % (ref 13–44)
MCH RBC QN AUTO: 29.3 PG (ref 26.1–32.9)
MCHC RBC AUTO-ENTMCNC: 33.9 G/DL (ref 31.4–35)
MCV RBC AUTO: 86.6 FL (ref 82–102)
MONOCYTES # BLD: 0.4 K/UL (ref 0.1–1.3)
MONOCYTES NFR BLD: 6 % (ref 4–12)
NEUTS SEG # BLD: 4.8 K/UL (ref 1.7–8.2)
NEUTS SEG NFR BLD: 73 % (ref 43–78)
NRBC # BLD: 0 K/UL (ref 0–0.2)
PLATELET # BLD AUTO: 184 K/UL (ref 150–450)
PMV BLD AUTO: 9.9 FL (ref 9.4–12.3)
POTASSIUM SERPL-SCNC: 4.2 MMOL/L (ref 3.5–5.1)
RBC # BLD AUTO: 4.84 M/UL (ref 4.05–5.2)
SODIUM SERPL-SCNC: 139 MMOL/L (ref 136–145)
WBC # BLD AUTO: 6.5 K/UL (ref 4.3–11.1)

## 2024-08-07 PROCEDURE — 3700000000 HC ANESTHESIA ATTENDED CARE: Performed by: SURGERY

## 2024-08-07 PROCEDURE — 2709999900 HC NON-CHARGEABLE SUPPLY: Performed by: SURGERY

## 2024-08-07 PROCEDURE — 7100000001 HC PACU RECOVERY - ADDTL 15 MIN: Performed by: SURGERY

## 2024-08-07 PROCEDURE — 88304 TISSUE EXAM BY PATHOLOGIST: CPT

## 2024-08-07 PROCEDURE — 6360000002 HC RX W HCPCS: Performed by: ANESTHESIOLOGY

## 2024-08-07 PROCEDURE — 85025 COMPLETE CBC W/AUTO DIFF WBC: CPT

## 2024-08-07 PROCEDURE — 2580000003 HC RX 258: Performed by: ANESTHESIOLOGY

## 2024-08-07 PROCEDURE — 6370000000 HC RX 637 (ALT 250 FOR IP): Performed by: ANESTHESIOLOGY

## 2024-08-07 PROCEDURE — 83690 ASSAY OF LIPASE: CPT

## 2024-08-07 PROCEDURE — 3600000019 HC SURGERY ROBOT ADDTL 15MIN: Performed by: SURGERY

## 2024-08-07 PROCEDURE — 81025 URINE PREGNANCY TEST: CPT

## 2024-08-07 PROCEDURE — 2500000003 HC RX 250 WO HCPCS: Performed by: NURSE ANESTHETIST, CERTIFIED REGISTERED

## 2024-08-07 PROCEDURE — 47562 LAPAROSCOPIC CHOLECYSTECTOMY: CPT | Performed by: SURGERY

## 2024-08-07 PROCEDURE — 7100000010 HC PHASE II RECOVERY - FIRST 15 MIN: Performed by: SURGERY

## 2024-08-07 PROCEDURE — 7100000011 HC PHASE II RECOVERY - ADDTL 15 MIN: Performed by: SURGERY

## 2024-08-07 PROCEDURE — S2900 ROBOTIC SURGICAL SYSTEM: HCPCS | Performed by: SURGERY

## 2024-08-07 PROCEDURE — 2500000003 HC RX 250 WO HCPCS: Performed by: SURGERY

## 2024-08-07 PROCEDURE — 6360000002 HC RX W HCPCS: Performed by: SURGERY

## 2024-08-07 PROCEDURE — 3600000009 HC SURGERY ROBOT BASE: Performed by: SURGERY

## 2024-08-07 PROCEDURE — 7100000000 HC PACU RECOVERY - FIRST 15 MIN: Performed by: SURGERY

## 2024-08-07 PROCEDURE — 3700000001 HC ADD 15 MINUTES (ANESTHESIA): Performed by: SURGERY

## 2024-08-07 PROCEDURE — 80048 BASIC METABOLIC PNL TOTAL CA: CPT

## 2024-08-07 PROCEDURE — 6360000002 HC RX W HCPCS: Performed by: NURSE ANESTHETIST, CERTIFIED REGISTERED

## 2024-08-07 RX ORDER — HYDROMORPHONE HYDROCHLORIDE 2 MG/ML
0.25 INJECTION, SOLUTION INTRAMUSCULAR; INTRAVENOUS; SUBCUTANEOUS EVERY 5 MIN PRN
Status: DISCONTINUED | OUTPATIENT
Start: 2024-08-07 | End: 2024-08-07 | Stop reason: HOSPADM

## 2024-08-07 RX ORDER — SODIUM CHLORIDE 0.9 % (FLUSH) 0.9 %
5-40 SYRINGE (ML) INJECTION EVERY 12 HOURS SCHEDULED
Status: DISCONTINUED | OUTPATIENT
Start: 2024-08-07 | End: 2024-08-07 | Stop reason: HOSPADM

## 2024-08-07 RX ORDER — HYDROMORPHONE HYDROCHLORIDE 2 MG/ML
INJECTION, SOLUTION INTRAMUSCULAR; INTRAVENOUS; SUBCUTANEOUS PRN
Status: DISCONTINUED | OUTPATIENT
Start: 2024-08-07 | End: 2024-08-07 | Stop reason: SDUPTHER

## 2024-08-07 RX ORDER — SODIUM CHLORIDE 0.9 % (FLUSH) 0.9 %
5-40 SYRINGE (ML) INJECTION PRN
Status: DISCONTINUED | OUTPATIENT
Start: 2024-08-07 | End: 2024-08-07 | Stop reason: HOSPADM

## 2024-08-07 RX ORDER — SODIUM CHLORIDE 9 MG/ML
INJECTION, SOLUTION INTRAVENOUS PRN
Status: DISCONTINUED | OUTPATIENT
Start: 2024-08-07 | End: 2024-08-07 | Stop reason: HOSPADM

## 2024-08-07 RX ORDER — PROPOFOL 10 MG/ML
INJECTION, EMULSION INTRAVENOUS PRN
Status: DISCONTINUED | OUTPATIENT
Start: 2024-08-07 | End: 2024-08-07 | Stop reason: SDUPTHER

## 2024-08-07 RX ORDER — TRAMADOL HYDROCHLORIDE 50 MG/1
50 TABLET ORAL EVERY 6 HOURS PRN
Qty: 20 TABLET | Refills: 0 | Status: SHIPPED | OUTPATIENT
Start: 2024-08-07 | End: 2024-08-12

## 2024-08-07 RX ORDER — GLYCOPYRROLATE 0.2 MG/ML
INJECTION INTRAMUSCULAR; INTRAVENOUS PRN
Status: DISCONTINUED | OUTPATIENT
Start: 2024-08-07 | End: 2024-08-07 | Stop reason: SDUPTHER

## 2024-08-07 RX ORDER — ONDANSETRON 4 MG/1
4 TABLET, ORALLY DISINTEGRATING ORAL EVERY 6 HOURS PRN
Qty: 30 TABLET | Refills: 1 | Status: SHIPPED | OUTPATIENT
Start: 2024-08-07

## 2024-08-07 RX ORDER — LIDOCAINE HYDROCHLORIDE 20 MG/ML
INJECTION, SOLUTION EPIDURAL; INFILTRATION; INTRACAUDAL; PERINEURAL PRN
Status: DISCONTINUED | OUTPATIENT
Start: 2024-08-07 | End: 2024-08-07 | Stop reason: SDUPTHER

## 2024-08-07 RX ORDER — PROCHLORPERAZINE EDISYLATE 5 MG/ML
5 INJECTION INTRAMUSCULAR; INTRAVENOUS
Status: DISCONTINUED | OUTPATIENT
Start: 2024-08-07 | End: 2024-08-07 | Stop reason: HOSPADM

## 2024-08-07 RX ORDER — NEOSTIGMINE METHYLSULFATE 1 MG/ML
INJECTION, SOLUTION INTRAVENOUS PRN
Status: DISCONTINUED | OUTPATIENT
Start: 2024-08-07 | End: 2024-08-07 | Stop reason: SDUPTHER

## 2024-08-07 RX ORDER — ROCURONIUM BROMIDE 10 MG/ML
INJECTION, SOLUTION INTRAVENOUS PRN
Status: DISCONTINUED | OUTPATIENT
Start: 2024-08-07 | End: 2024-08-07 | Stop reason: SDUPTHER

## 2024-08-07 RX ORDER — INDOCYANINE GREEN AND WATER 25 MG
2.5 KIT INJECTION ONCE
Status: COMPLETED | OUTPATIENT
Start: 2024-08-07 | End: 2024-08-07

## 2024-08-07 RX ORDER — DEXAMETHASONE SODIUM PHOSPHATE 4 MG/ML
INJECTION, SOLUTION INTRA-ARTICULAR; INTRALESIONAL; INTRAMUSCULAR; INTRAVENOUS; SOFT TISSUE PRN
Status: DISCONTINUED | OUTPATIENT
Start: 2024-08-07 | End: 2024-08-07 | Stop reason: SDUPTHER

## 2024-08-07 RX ORDER — OXYCODONE HYDROCHLORIDE 5 MG/1
5 TABLET ORAL PRN
Status: DISCONTINUED | OUTPATIENT
Start: 2024-08-07 | End: 2024-08-07 | Stop reason: HOSPADM

## 2024-08-07 RX ORDER — FOSAPREPITANT 150 MG/5ML
INJECTION, POWDER, LYOPHILIZED, FOR SOLUTION INTRAVENOUS
Status: DISCONTINUED
Start: 2024-08-07 | End: 2024-08-07 | Stop reason: WASHOUT

## 2024-08-07 RX ORDER — HYDROMORPHONE HYDROCHLORIDE 2 MG/ML
0.5 INJECTION, SOLUTION INTRAMUSCULAR; INTRAVENOUS; SUBCUTANEOUS EVERY 5 MIN PRN
Status: DISCONTINUED | OUTPATIENT
Start: 2024-08-07 | End: 2024-08-07 | Stop reason: HOSPADM

## 2024-08-07 RX ORDER — EPHEDRINE SULFATE 5 MG/ML
INJECTION INTRAVENOUS PRN
Status: DISCONTINUED | OUTPATIENT
Start: 2024-08-07 | End: 2024-08-07 | Stop reason: SDUPTHER

## 2024-08-07 RX ORDER — NALOXONE HYDROCHLORIDE 0.4 MG/ML
INJECTION, SOLUTION INTRAMUSCULAR; INTRAVENOUS; SUBCUTANEOUS PRN
Status: DISCONTINUED | OUTPATIENT
Start: 2024-08-07 | End: 2024-08-07 | Stop reason: HOSPADM

## 2024-08-07 RX ORDER — ONDANSETRON 2 MG/ML
4 INJECTION INTRAMUSCULAR; INTRAVENOUS
Status: DISCONTINUED | OUTPATIENT
Start: 2024-08-07 | End: 2024-08-07 | Stop reason: HOSPADM

## 2024-08-07 RX ORDER — ACETAMINOPHEN 500 MG
1000 TABLET ORAL ONCE
Status: COMPLETED | OUTPATIENT
Start: 2024-08-07 | End: 2024-08-07

## 2024-08-07 RX ORDER — SODIUM CHLORIDE, SODIUM LACTATE, POTASSIUM CHLORIDE, CALCIUM CHLORIDE 600; 310; 30; 20 MG/100ML; MG/100ML; MG/100ML; MG/100ML
INJECTION, SOLUTION INTRAVENOUS CONTINUOUS
Status: DISCONTINUED | OUTPATIENT
Start: 2024-08-07 | End: 2024-08-07 | Stop reason: HOSPADM

## 2024-08-07 RX ORDER — MIDAZOLAM HYDROCHLORIDE 2 MG/2ML
2 INJECTION, SOLUTION INTRAMUSCULAR; INTRAVENOUS
Status: DISCONTINUED | OUTPATIENT
Start: 2024-08-07 | End: 2024-08-07 | Stop reason: HOSPADM

## 2024-08-07 RX ORDER — LIDOCAINE HYDROCHLORIDE 10 MG/ML
1 INJECTION, SOLUTION INFILTRATION; PERINEURAL
Status: DISCONTINUED | OUTPATIENT
Start: 2024-08-07 | End: 2024-08-07 | Stop reason: HOSPADM

## 2024-08-07 RX ORDER — FENTANYL CITRATE 50 UG/ML
INJECTION, SOLUTION INTRAMUSCULAR; INTRAVENOUS PRN
Status: DISCONTINUED | OUTPATIENT
Start: 2024-08-07 | End: 2024-08-07 | Stop reason: SDUPTHER

## 2024-08-07 RX ORDER — BUPIVACAINE HYDROCHLORIDE AND EPINEPHRINE 2.5; 5 MG/ML; UG/ML
INJECTION, SOLUTION EPIDURAL; INFILTRATION; INTRACAUDAL; PERINEURAL PRN
Status: DISCONTINUED | OUTPATIENT
Start: 2024-08-07 | End: 2024-08-07 | Stop reason: ALTCHOICE

## 2024-08-07 RX ORDER — METRONIDAZOLE 500 MG/100ML
500 INJECTION, SOLUTION INTRAVENOUS ONCE
Status: COMPLETED | OUTPATIENT
Start: 2024-08-07 | End: 2024-08-07

## 2024-08-07 RX ORDER — ONDANSETRON 2 MG/ML
INJECTION INTRAMUSCULAR; INTRAVENOUS PRN
Status: DISCONTINUED | OUTPATIENT
Start: 2024-08-07 | End: 2024-08-07 | Stop reason: SDUPTHER

## 2024-08-07 RX ORDER — OXYCODONE HYDROCHLORIDE 5 MG/1
10 TABLET ORAL PRN
Status: DISCONTINUED | OUTPATIENT
Start: 2024-08-07 | End: 2024-08-07 | Stop reason: HOSPADM

## 2024-08-07 RX ADMIN — HYDROMORPHONE HYDROCHLORIDE 0.4 MG: 2 INJECTION INTRAMUSCULAR; INTRAVENOUS; SUBCUTANEOUS at 08:42

## 2024-08-07 RX ADMIN — Medication 2000 MG: at 08:15

## 2024-08-07 RX ADMIN — EPHEDRINE SULFATE 10 MG: 5 INJECTION INTRAVENOUS at 08:19

## 2024-08-07 RX ADMIN — SODIUM CHLORIDE, SODIUM LACTATE, POTASSIUM CHLORIDE, AND CALCIUM CHLORIDE: 600; 310; 30; 20 INJECTION, SOLUTION INTRAVENOUS at 09:13

## 2024-08-07 RX ADMIN — ROCURONIUM BROMIDE 10 MG: 10 INJECTION, SOLUTION INTRAVENOUS at 08:34

## 2024-08-07 RX ADMIN — ONDANSETRON 4 MG: 2 INJECTION INTRAMUSCULAR; INTRAVENOUS at 08:29

## 2024-08-07 RX ADMIN — FENTANYL CITRATE 100 MCG: 50 INJECTION, SOLUTION INTRAMUSCULAR; INTRAVENOUS at 08:09

## 2024-08-07 RX ADMIN — GLYCOPYRROLATE 0.6 MG: 0.2 INJECTION INTRAMUSCULAR; INTRAVENOUS at 09:15

## 2024-08-07 RX ADMIN — METRONIDAZOLE 500 MG: 500 INJECTION, SOLUTION INTRAVENOUS at 07:15

## 2024-08-07 RX ADMIN — INDOCYANINE GREEN AND WATER 2.5 MG: KIT at 07:15

## 2024-08-07 RX ADMIN — ACETAMINOPHEN 1000 MG: 500 TABLET, FILM COATED ORAL at 07:17

## 2024-08-07 RX ADMIN — SODIUM CHLORIDE 150 MG: 9 INJECTION, SOLUTION INTRAVENOUS at 07:53

## 2024-08-07 RX ADMIN — LIDOCAINE HYDROCHLORIDE 60 MG: 20 INJECTION, SOLUTION EPIDURAL; INFILTRATION; INTRACAUDAL; PERINEURAL at 08:09

## 2024-08-07 RX ADMIN — ROCURONIUM BROMIDE 50 MG: 10 INJECTION, SOLUTION INTRAVENOUS at 08:09

## 2024-08-07 RX ADMIN — SODIUM CHLORIDE, SODIUM LACTATE, POTASSIUM CHLORIDE, AND CALCIUM CHLORIDE: 600; 310; 30; 20 INJECTION, SOLUTION INTRAVENOUS at 07:00

## 2024-08-07 RX ADMIN — PROPOFOL 200 MG: 10 INJECTION, EMULSION INTRAVENOUS at 08:09

## 2024-08-07 RX ADMIN — HYDROMORPHONE HYDROCHLORIDE 0.2 MG: 2 INJECTION INTRAMUSCULAR; INTRAVENOUS; SUBCUTANEOUS at 08:56

## 2024-08-07 RX ADMIN — Medication 4 MG: at 09:15

## 2024-08-07 RX ADMIN — DEXAMETHASONE SODIUM PHOSPHATE 4 MG: 4 INJECTION INTRA-ARTICULAR; INTRALESIONAL; INTRAMUSCULAR; INTRAVENOUS; SOFT TISSUE at 08:29

## 2024-08-07 ASSESSMENT — PAIN - FUNCTIONAL ASSESSMENT: PAIN_FUNCTIONAL_ASSESSMENT: 0-10

## 2024-08-07 NOTE — ADDENDUM NOTE
Addendum  created 08/07/24 1041 by Lucy Yo APRN - CRNA    Flowsheet accepted, Flowsheet data copied forward, Intraprocedure Flowsheets edited

## 2024-08-07 NOTE — ANESTHESIA PRE PROCEDURE
for: \"PREGTESTUR\", \"PREGSERUM\", \"HCG\", \"HCGQUANT\"     ABGs: No results found for: \"PHART\", \"PO2ART\", \"SVR3YAJ\", \"YRX1XPC\", \"BEART\", \"J3ANTZYT\"     Type & Screen (If Applicable):  No results found for: \"LABABO\"    Drug/Infectious Status (If Applicable):  Lab Results   Component Value Date/Time    HEPCAB 0.1 07/30/2020 11:22 AM       COVID-19 Screening (If Applicable):   Lab Results   Component Value Date/Time    COVID19 Performed 12/21/2021 03:05 PM    COVID19 Not Detected 12/21/2021 03:05 PM           Anesthesia Evaluation  Patient summary reviewed and Nursing notes reviewed   no history of anesthetic complications:   Airway: Mallampati: II          Dental: normal exam         Pulmonary:Negative Pulmonary ROS breath sounds clear to auscultation                             Cardiovascular:Negative CV ROS  Exercise tolerance: good (>4 METS)                    Neuro/Psych:   (+) depression/anxiety             GI/Hepatic/Renal:   (+) GERD: well controlled, morbid obesity          Endo/Other: Negative Endo/Other ROS   (+) Diabetes (Gestational).                 Abdominal:             Vascular: negative vascular ROS.         Other Findings:         Anesthesia Plan      general     ASA 3       Induction: intravenous.      Anesthetic plan and risks discussed with patient.                      COREY CONTEH MD   8/7/2024

## 2024-08-07 NOTE — DISCHARGE INSTRUCTIONS
Leave outer dressings for 4 days, then remove. Leave steristrip dressings intact until seen in follow up.  Diet: sips of clear liquids today.  Clear liquid diet in AM then advance as tolerated over next few days as nausea improves and function becomes more normal.  OK to be on liquids for a few days.   OK to shower tomorrow but do not spray water directly into wounds.   No heavy lifting (>10lbs) for 6 weeks to reduce risk of disrupting repair.   May resume normal activity including walking, which is encouraged to reduce risk of blood clots.   No driving until you are completely off pain meds for 24hrs and have no pain with movements associated with driving.   Pain and nausea prescriptions e-scribed for patient to use as needed      Follow-up with Dr Hickman's NP in 2 weeks ([phone])       Cholecystectomy: What to Expect at Home  Your Recovery  After your surgery, it is normal to feel weak and tired for several days after you return home. Your belly may be swollen. Since you had laparoscopic surgery, you may also have pain in your shoulder for about 24 hours. You may have gas or need to burp a lot at first, and a few people get diarrhea. The diarrhea usually goes away in 2 to 4 weeks, but it may last longer.  For a laparoscopic surgery, most people can go back to work or their normal routine in 1 to 2 weeks, but it may take longer, depending on the type of work you do.    This care sheet gives you a general idea about how long it will take for you to recover. However, each person recovers at a different pace. Follow the steps below to get better as quickly as possible.  How can you care for yourself at home?  Activity  Rest when you feel tired. Getting enough sleep will help you recover.  Try to walk each day. Start out by walking a little more than you did the day before. Gradually increase the amount you walk. Walking boosts blood flow and helps prevent pneumonia and constipation.  For about 2 to 4 weeks, avoid  after discharge, and you are experiencing discomfort from urinary retention, please go to the nearest ED.  If you have sleep apnea and have a CPAP machine, please use it for all naps and sleeping.  Please use caution when taking narcotics and any of your home medications that may cause drowsiness.  *  Please give a list of your current medications to your Primary Care Provider.  *  Please update this list whenever your medications are discontinued, doses are      changed, or new medications (including over-the-counter products) are added.  *  Please carry medication information at all times in case of emergency situations.    These are general instructions for a healthy lifestyle:  No smoking/ No tobacco products/ Avoid exposure to second hand smoke  Surgeon General's Warning:  Quitting smoking now greatly reduces serious risk to your health.  Obesity, smoking, and sedentary lifestyle greatly increases your risk for illness  A healthy diet, regular physical exercise & weight monitoring are important for maintaining a healthy lifestyle    You may be retaining fluid if you have a history of heart failure or if you experience any of the following symptoms:  Weight gain of 3 pounds or more overnight or 5 pounds in a week, increased swelling in our hands or feet or shortness of breath while lying flat in bed.  Please call your doctor as soon as you notice any of these symptoms; do not wait until your next office visit.

## 2024-08-07 NOTE — BRIEF OP NOTE
Brief Postoperative Note      Patient: Lizeth Munoz  YOB: 1985  MRN: [id number]    Date of Procedure: 2024    Pre-Op Diagnosis Codes:     * Cholelithiasis with h/o acute on chronic cholecystitis    Post-Op Diagnosis: Same       Procedure(s):  MULTIPORT ROBOTIC CHOLECYSTECTOMY    Surgeon(s):  Nate Hickman MD    Assistant:  Surgical Assistant: Ailyn Morales    Anesthesia: General    Estimated Blood Loss (mL): Minimal    Complications: None    Specimens:   ID Type Source Tests Collected by Time Destination   A : GALLBLADDER Tissue Gallbladder SURGICAL PATHOLOGY Nate Hickman MD 2024 0908        Implants:  * No implants in log *      Drains:   Urinary Catheter [address] (Active)       Findings:  Infection Present At Time Of Surgery (PATOS) (choose all levels that have infection present):  No infection present  Other Findings: Fatty liver, but otherwise normal appearance, texture and pliability.  Omentum in  diastasis.  Much improved GB inflammation.  Normal ICG uptake.  Normal path of ducts without filling defects in cystic duct.  Normal anatomy and critical view.  Supraumbilical trocar site closed with direct placement of 0-Vicryl.  GB opened on back table revealing normal rosita bile, cholesterol stones, cholesterolosis, normal anatomy.    Electronically signed by Nate Hickman MD on 2024 at 9:40 AM

## 2024-08-07 NOTE — ANESTHESIA POSTPROCEDURE EVALUATION
Department of Anesthesiology  Postprocedure Note    Patient: Lizeth Munoz  MRN: [id number]  YOB: 1985  Date of evaluation: 8/7/2024    Procedure Summary       Date: 08/07/24 Room / Location: [facility] MAIN OR  / [facility] MAIN OR    Anesthesia Start: 0758 Anesthesia Stop: 0936    Procedure: MULTIPORT ROBOTIC CHOLECYSTECTOMY, POSSIBLE OPEN (Abdomen) Diagnosis:       Cholecystitis, unspecified      (Cholecystitis, unspecified [K81.9])    Providers: Nate Hickman MD Responsible Provider: Abdiel Beltran MD    Anesthesia Type: general ASA Status: 3            Anesthesia Type: No value filed.    Jn Phase I: Jn Score: 7    Jn Phase II: Jn Score: 10    Anesthesia Post Evaluation    Patient location during evaluation: PACU  Patient participation: complete - patient participated  Level of consciousness: awake and alert  Airway patency: patent  Nausea & Vomiting: no nausea and no vomiting  Cardiovascular status: hemodynamically stable  Respiratory status: acceptable, nonlabored ventilation and spontaneous ventilation  Hydration status: euvolemic  Comments: BP (!) 102/58   Pulse 68   Temp 98.1 °F (36.7 °C) (Temporal)   Resp 18   Ht 1.549 m (5' 1\")   Wt 107 kg (236 lb)   SpO2 96%   Breastfeeding No   BMI 44.59 kg/m²     Multimodal analgesia pain management approach  Pain management: adequate and satisfactory to patient    No notable events documented.

## 2024-08-12 NOTE — OP NOTE
there was no evidence of bile leak or bleeding.   My scrubbed assistant placed an Endo Catch bag via the supraumbilical trocar site and the gallbladder was placed within the pouch.  The robot was undocked and the gallbladder and pouch were removed through the supraumbilical site after I left the surgeon's console and re-scrubbed.  The supraumbilical port was closed with 0 Vicryl suture using a direct placement of suture under laparoscopic visualization.  Trochars were removed without signs of bleeding.  Pneumoperitoneum  was desufflated.  Additional local anesthetic was infiltrated into each trocar site.  The trocar incisions were closed with subcuticular 4-0 Vicryl suture.  Steri-Strips were placed. The wounds were dressed sterilely with OpSite's.  GB opened on back table revealing normal rosita bile, cholesterol stones, cholesterolosis, normal anatomy.  All sponge, instruments, and needle counts were correct.  The patient was taken to PACU in good condition.    Electronically signed by Nate Hickman MD on 8/7/2024 at 9:40 AM

## 2024-08-19 NOTE — PROGRESS NOTES
Value Date     08/07/2024    K 4.2 08/07/2024     08/07/2024    CO2 23 08/07/2024    BUN 8 08/07/2024    CREATININE 0.53 (L) 08/07/2024    GLUCOSE 97 08/07/2024    CALCIUM 9.0 08/07/2024    BILITOT 0.1 (L) 06/21/2023    ALKPHOS 93 06/21/2023    AST 10 (L) 06/21/2023    ALT 14 06/21/2023    LABGLOM >90 08/07/2024    GLOB 3.2 06/21/2023         No results found for: \"AMYLASE\"  Lab Results   Component Value Date    LIPASE 37 08/07/2024      US Result (most recent):  US ABDOMEN LIMITED 06/19/2024    Narrative  RIGHT UPPER QUADRANT  ULTRASOUND    INDICATION: Right upper quadrant pain. Evaluate for gallstones.    COMPARISON: None    Transabdominal imaging of the upper abdomen was performed.    FINDINGS:  -LIVER: 18.4 cm.  Normal echogenicity.  No masses.  -BILE DUCTS: No intrahepatic bile duct dilatation.  CBD diameter = 4 mm.  -GALLBLADDER: Contracted stone filled gallbladder.  -PANCREAS: Normal.    -RIGHT KIDNEY: 12.6 cm.  No mass or hydronephrosis.  -ABDOMINAL AORTA AND IVC: Normal in size.  -ASCITES: No free fluid.    Impression  1. Nonspecific hepatomegaly.  2. Cholelithiasis with chronic cholecystitis.      Electronically signed by Guido Paris          CT Result (most recent):  No results found for this or any previous visit from the past 3650 days.        Assessment/Plan:     Lizeth Munoz is a 39 y.o. female who is seen in the office on 6/24/2024, with recent attack of severe biliary colic or acute on chronic cholecystitis.  The attack was 1 week ago and lasted 3.5 hours and has remained mostly quiescent.  She has had no jaundice symptoms.  She has had no lab work.  We will obtain some blood work today.  Given the severity of her tach and the look of her gallbladder on ultrasound, we discussed waiting several more weeks to allow for further calming down of the gallbladder.  She understands that we may need to intervene if she develops more severe symptoms.  She will try to keep a very light

## 2024-08-21 ENCOUNTER — OFFICE VISIT (OUTPATIENT)
Dept: SURGERY | Age: 39
End: 2024-08-21

## 2024-08-21 VITALS — WEIGHT: 236 LBS | BODY MASS INDEX: 44.56 KG/M2 | HEIGHT: 61 IN

## 2024-08-21 DIAGNOSIS — K80.12 CALCULUS OF GALLBLADDER WITH ACUTE ON CHRONIC CHOLECYSTITIS WITHOUT OBSTRUCTION: Primary | ICD-10-CM

## (undated) DEVICE — NEEDLE INSUF L120MM ULT VERES ENDOPATH

## (undated) DEVICE — KENDALL SCD EXPRESS SLEEVES, KNEE LENGTH, MEDIUM: Brand: KENDALL SCD

## (undated) DEVICE — PENCIL ES L3M BTTN SWCH S STL HEX LOK BLDE ELECTRD HOLSTER

## (undated) DEVICE — SUTURE VCRL SZ 0 L36IN ABSRB UD L36MM CT-1 1/2 CIR J946H

## (undated) DEVICE — COVER,TABLE,44X76,STERILE: Brand: MEDLINE

## (undated) DEVICE — SOLUTION IV 1000ML 0.9% SOD CHL

## (undated) DEVICE — STRIP,CLOSURE,WOUND,MEDI-STRIP,1/2X4: Brand: MEDLINE

## (undated) DEVICE — BLADELESS OBTURATOR: Brand: WECK VISTA

## (undated) DEVICE — REDUCER: Brand: ENDOWRIST

## (undated) DEVICE — ABSORBENT, WATERPROOF, BACTERIA PROOF FILM DRESSING: Brand: OPSITE POST OP 9.5X8.5CM CTN 20

## (undated) DEVICE — ANCHOR TISSUE RETRIEVAL SYSTEM, BAG SIZE 125 ML, PORT SIZE 8 MM: Brand: ANCHOR TISSUE RETRIEVAL SYSTEM

## (undated) DEVICE — TRAY CATH 16FR PVC INTMIT STR TIP PREATTACH TO 1000ML COLL

## (undated) DEVICE — GLOVE SURG SZ 75 CRM LTX FREE POLYISOPRENE POLYMER BEAD ANTI

## (undated) DEVICE — PREP SKN DURAPREP 26ML APPL --

## (undated) DEVICE — TIP COVER ACCESSORY

## (undated) DEVICE — SURGICAL PROCEDURE PACK C SECT CDS

## (undated) DEVICE — SUT CHRMC 1 27IN CT1 BRN --

## (undated) DEVICE — MEDI-VAC NON-CONDUCTIVE SUCTION TUBING: Brand: CARDINAL HEALTH

## (undated) DEVICE — MASTISOL ADHESIVE LIQ 2/3ML

## (undated) DEVICE — ELECTRO LUBE IS A SINGLE PATIENT USE DEVICE THAT IS INTENDED TO BE USED ON ELECTROSURGICAL ELECTRODES TO REDUCE STICKING.: Brand: KEY SURGICAL ELECTRO LUBE

## (undated) DEVICE — INTENDED FOR TISSUE SEPARATION, AND OTHER PROCEDURES THAT REQUIRE A SHARP SURGICAL BLADE TO PUNCTURE OR CUT.: Brand: BARD-PARKER ® STAINLESS STEEL BLADES

## (undated) DEVICE — TUBING, SUCTION, 1/4" X 10', STRAIGHT: Brand: MEDLINE

## (undated) DEVICE — SOLUTION IRRIG 1000ML H2O STRL BLT

## (undated) DEVICE — SUTURE PDS + SZ 0 L27IN ABSRB VLT L36MM CT 1 1 2 CIR PDP340H

## (undated) DEVICE — SUTURE PLN GUT SZ 2-0 L27IN ABSRB YELLOWISH TAN L40MM CT 853H

## (undated) DEVICE — TRAY,URINE METER,100% SILICONE,16FR10ML: Brand: MEDLINE

## (undated) DEVICE — CATH FOL TY IC BAG 16FR 2000ML -- CONVERT TO ITEM 363158

## (undated) DEVICE — AMD ANTIMICROBIAL NON-ADHERENT PAD,0.2% POLYHEXAMETHYLENE BIGUANIDE HCI (PHMB): Brand: TELFA

## (undated) DEVICE — Device: Brand: PORTEX

## (undated) DEVICE — ELECTRODE PT RET AD L9FT HI MOIST COND ADH HYDRGEL CORDED

## (undated) DEVICE — SUTURE VICRYL + SZ 4-0 L27IN ABSRB UD PS-2 3/8 CIR REV CUT VCP426H

## (undated) DEVICE — AMD ANTIMICROBIAL GAUZE SPONGES,12 PLY USP TYPE VII, 0.2% POLYHEXAMETHYLENE BIGUANIDE HCI (PHMB): Brand: CURITY

## (undated) DEVICE — STERILE POLYISOPRENE POWDER-FREE SURGICAL GLOVES: Brand: PROTEXIS

## (undated) DEVICE — APPLICATOR BNDG 1MM ADH PREMIERPRO EXOFIN

## (undated) DEVICE — SOLUTION ANTIFOG VIS SYS CLEARIFY LAPSCP

## (undated) DEVICE — SEALER ENDOSCP NANO COAT OPN DIV CRV L JAW LIGASURE IMPACT

## (undated) DEVICE — SEAL

## (undated) DEVICE — SUTURE MCRYL SZ 4-0 L27IN ABSRB UD L19MM PS-2 1/2 CIR PRIM Y426H

## (undated) DEVICE — ARM DRAPE

## (undated) DEVICE — COLUMN DRAPE

## (undated) DEVICE — SUTURE MCRYL SZ 3-0 L27IN ABSRB UD L60MM KS STR REV CUT Y523H

## (undated) DEVICE — TRAY PREP DRY W/ PREM GLV 2 APPL 6 SPNG 2 UNDPD 1 OVERWRAP

## (undated) DEVICE — TRAXI PANNICULUS RETRACTOR WITH RETENTUS TECHNOLOGY (BMI 30-50): Brand: TRAXI® PANNICULUS RETRACTOR

## (undated) DEVICE — AGENT HEMSTAT 3GM OXIDIZED REGENERATED CELOS ABSRB FOR CONT (ORDER MULTIPLES OF 5EA)

## (undated) DEVICE — STERILE LATEX POWDER FREE SURGICAL GLOVES WITH HYDROGEL COATING: Brand: PROTEXIS

## (undated) DEVICE — GENERAL LAPAROSCOPY: Brand: MEDLINE INDUSTRIES, INC.

## (undated) DEVICE — DERMABOND SKIN ADH 0.7ML -- DERMABOND ADVANCED 12/BX

## (undated) DEVICE — SUTURE VCRL SZ 2-0 L36IN ABSRB VLT L36MM CT-1 1/2 CIR J345H

## (undated) DEVICE — SUT CHRMC 1 36IN CTX BRN --

## (undated) DEVICE — [HIGH FLOW INSUFFLATOR,  DO NOT USE IF PACKAGE IS DAMAGED,  KEEP DRY,  KEEP AWAY FROM SUNLIGHT,  PROTECT FROM HEAT AND RADIOACTIVE SOURCES.]: Brand: PNEUMOSURE

## (undated) DEVICE — PREMIUM WET SKIN PREP TRAY: Brand: MEDLINE INDUSTRIES, INC.

## (undated) DEVICE — REM POLYHESIVE ADULT PATIENT RETURN ELECTRODE: Brand: VALLEYLAB

## (undated) DEVICE — SUTURE VICRYL + SZ 0 L27IN ABSRB VLT L26MM UR-6 5/8 CIR VCP603H